# Patient Record
Sex: MALE | Race: WHITE | Employment: OTHER
[De-identification: names, ages, dates, MRNs, and addresses within clinical notes are randomized per-mention and may not be internally consistent; named-entity substitution may affect disease eponyms.]

---

## 2018-05-28 PROBLEM — K92.2 GASTROINTESTINAL HEMORRHAGE: Status: ACTIVE | Noted: 2018-05-28

## 2018-10-06 PROBLEM — L03.90 CELLULITIS: Status: ACTIVE | Noted: 2018-10-06

## 2022-03-19 PROBLEM — L03.90 CELLULITIS: Status: ACTIVE | Noted: 2018-10-06

## 2022-03-19 PROBLEM — K92.2 GASTROINTESTINAL HEMORRHAGE: Status: ACTIVE | Noted: 2018-05-28

## 2024-04-19 ENCOUNTER — HOSPITAL ENCOUNTER (EMERGENCY)
Facility: HOSPITAL | Age: 84
Discharge: HOME | End: 2024-04-19
Attending: EMERGENCY MEDICINE
Payer: MEDICARE

## 2024-04-19 ENCOUNTER — APPOINTMENT (OUTPATIENT)
Facility: HOSPITAL | Age: 84
End: 2024-04-19
Payer: MEDICARE

## 2024-04-19 VITALS
BODY MASS INDEX: 25.34 KG/M2 | DIASTOLIC BLOOD PRESSURE: 88 MMHG | HEART RATE: 73 BPM | RESPIRATION RATE: 18 BRPM | SYSTOLIC BLOOD PRESSURE: 135 MMHG | OXYGEN SATURATION: 97 % | TEMPERATURE: 97.9 F | HEIGHT: 70 IN | WEIGHT: 177 LBS

## 2024-04-19 DIAGNOSIS — S01.01XA LACERATION OF SCALP, INITIAL ENCOUNTER: Primary | ICD-10-CM
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
## 2024-04-19 PROCEDURE — 70450 CT HEAD/BRAIN W/O DYE: CPT

## 2024-04-19 PROCEDURE — 72125 CT NECK SPINE W/O DYE: CPT

## 2024-04-19 PROCEDURE — 99284 EMERGENCY DEPT VISIT MOD MDM: CPT

## 2024-04-19 ASSESSMENT — PAIN DESCRIPTION - PAIN TYPE: TYPE: ACUTE PAIN

## 2024-04-19 ASSESSMENT — PAIN DESCRIPTION - LOCATION: LOCATION: HEAD

## 2024-04-19 ASSESSMENT — PAIN - FUNCTIONAL ASSESSMENT: PAIN_FUNCTIONAL_ASSESSMENT: 0-10

## 2024-04-19 ASSESSMENT — PAIN DESCRIPTION - ORIENTATION: ORIENTATION: POSTERIOR

## 2024-04-19 ASSESSMENT — PAIN SCALES - GENERAL: PAINLEVEL_OUTOF10: 2

## 2024-04-19 NOTE — ED TRIAGE NOTES
Pt was brought to triage by his neighbor c/o fall and posterior head laceration.    Pt states his rollator was caught on door and he fell backwards. (-) LOC.     Pt takes Plavix.

## 2024-04-20 NOTE — ED PROVIDER NOTES
EMERGENCY DEPARTMENT HISTORY AND PHYSICAL EXAM      Patient Name: Stefano Begum Jr.  MRN: [id number]  YOB: 1940  Provider: Marielos Blake MD  PCP: Mignon Foster PA   Time/Date of evaluation: 11:08 PM EDT on 4/19/24    History of Presenting Illness     Chief Complaint   Patient presents with    Fall    Head Laceration       History Provided By: EMS and Patient     History (Narative):   Stefano Begum Jr. is a 84 y.o. male with a PMHX of peripheral vascular disease, hypertension, depression, and takes Plavix  who presents to the emergency department  by POV C/O a fall at home and hitting the back of his head.  The patient states that he was walking with his rollator walker from the living room to the kitchen.  His wheel got stuck on the door jam.  He fell backwards on the tile floor and hit his head.  He did have significant bleeding and he does take Plavix.  He became concerned and decided to come to the ED for further evaluation.    He denies losing consciousness.        Past History     Past Medical History:  No past medical history on file.    Past Surgical History:  No past surgical history on file.    Family History:  No family history on file.    Social History:       Medications:  No current facility-administered medications for this encounter.     No current outpatient medications on file.       Allergies:  No Known Allergies    Social Determinants of Health:  Social Determinants of Health     Tobacco Use: Not on file   Alcohol Use: Not on file   Financial Resource Strain: Not on file   Food Insecurity: Not on file   Transportation Needs: Not on file   Physical Activity: Not on file   Stress: Not on file   Social Connections: Not on file   Intimate Partner Violence: Not on file   Depression: Not on file   Housing Stability: Not on file   Interpersonal Safety: Not on file   Utilities: Not on file       Review of Systems     Negative except as listed above in HPI.    Physical Exam     Vitals:

## 2024-04-20 NOTE — ED NOTES
Patient discharged to home. Discharge papers explained and given to patient. Escorted patient via wheel chair to ER exit to waiting car.

## 2024-12-23 ENCOUNTER — APPOINTMENT (OUTPATIENT)
Facility: HOSPITAL | Age: 84
DRG: 539 | End: 2024-12-23
Payer: MEDICARE

## 2024-12-23 ENCOUNTER — HOSPITAL ENCOUNTER (INPATIENT)
Facility: HOSPITAL | Age: 84
LOS: 16 days | Discharge: NURSING, REHAB OR LONG-TERM CARE | DRG: 539 | End: 2025-01-08
Attending: STUDENT IN AN ORGANIZED HEALTH CARE EDUCATION/TRAINING PROGRAM | Admitting: INTERNAL MEDICINE
Payer: MEDICARE

## 2024-12-23 DIAGNOSIS — I50.9 CHRONIC CONGESTIVE HEART FAILURE, UNSPECIFIED HEART FAILURE TYPE (HCC): ICD-10-CM

## 2024-12-23 DIAGNOSIS — I96 WET GANGRENE (HCC): Primary | ICD-10-CM

## 2024-12-23 DIAGNOSIS — L03.90 WOUND CELLULITIS: ICD-10-CM

## 2024-12-23 DIAGNOSIS — R79.82 ELEVATED C-REACTIVE PROTEIN (CRP): ICD-10-CM

## 2024-12-23 LAB
ALBUMIN SERPL-MCNC: 2.7 G/DL (ref 3.4–5)
ALBUMIN/GLOB SERPL: 0.7 (ref 0.8–1.7)
ALP SERPL-CCNC: 99 U/L (ref 45–117)
ALT SERPL-CCNC: 31 U/L (ref 16–61)
ANION GAP SERPL CALC-SCNC: 2 MMOL/L (ref 3–18)
AST SERPL-CCNC: 31 U/L (ref 10–38)
BILIRUB SERPL-MCNC: 0.9 MG/DL (ref 0.2–1)
BUN SERPL-MCNC: 11 MG/DL (ref 7–18)
BUN/CREAT SERPL: 15 (ref 12–20)
CALCIUM SERPL-MCNC: 8.8 MG/DL (ref 8.5–10.1)
CHLORIDE SERPL-SCNC: 104 MMOL/L (ref 100–111)
CO2 SERPL-SCNC: 33 MMOL/L (ref 21–32)
CREAT SERPL-MCNC: 0.71 MG/DL (ref 0.6–1.3)
CRP SERPL-MCNC: 4 MG/DL (ref 0–0.3)
EKG DIAGNOSIS: NORMAL
EKG Q-T INTERVAL: 380 MS
EKG QRS DURATION: 84 MS
EKG QTC CALCULATION (BAZETT): 459 MS
EKG R AXIS: 54 DEGREES
EKG T AXIS: -82 DEGREES
EKG VENTRICULAR RATE: 88 BPM
ERYTHROCYTE [DISTWIDTH] IN BLOOD BY AUTOMATED COUNT: 16.8 % (ref 11.6–14.5)
ERYTHROCYTE [SEDIMENTATION RATE] IN BLOOD: 20 MM/HR (ref 0–20)
ETHANOL SERPL-MCNC: <3 MG/DL (ref 0–3)
GLOBULIN SER CALC-MCNC: 3.8 G/DL (ref 2–4)
GLUCOSE SERPL-MCNC: 112 MG/DL (ref 74–99)
HCT VFR BLD AUTO: 41.6 % (ref 36–48)
HGB BLD-MCNC: 12.7 G/DL (ref 13–16)
MCH RBC QN AUTO: 27.3 PG (ref 24–34)
MCHC RBC AUTO-ENTMCNC: 30.5 G/DL (ref 31–37)
MCV RBC AUTO: 89.5 FL (ref 78–100)
NRBC # BLD: 0 K/UL (ref 0–0.01)
NRBC BLD-RTO: 0 PER 100 WBC
PLATELET # BLD AUTO: 131 K/UL (ref 135–420)
PMV BLD AUTO: 10.4 FL (ref 9.2–11.8)
POTASSIUM SERPL-SCNC: 3.8 MMOL/L (ref 3.5–5.5)
PROT SERPL-MCNC: 6.5 G/DL (ref 6.4–8.2)
RBC # BLD AUTO: 4.65 M/UL (ref 4.35–5.65)
SODIUM SERPL-SCNC: 139 MMOL/L (ref 136–145)
WBC # BLD AUTO: 11.3 K/UL (ref 4.6–13.2)

## 2024-12-23 PROCEDURE — 6360000004 HC RX CONTRAST MEDICATION: Performed by: STUDENT IN AN ORGANIZED HEALTH CARE EDUCATION/TRAINING PROGRAM

## 2024-12-23 PROCEDURE — 1100000000 HC RM PRIVATE

## 2024-12-23 PROCEDURE — 99223 1ST HOSP IP/OBS HIGH 75: CPT

## 2024-12-23 PROCEDURE — 2580000003 HC RX 258: Performed by: STUDENT IN AN ORGANIZED HEALTH CARE EDUCATION/TRAINING PROGRAM

## 2024-12-23 PROCEDURE — 73706 CT ANGIO LWR EXTR W/O&W/DYE: CPT

## 2024-12-23 PROCEDURE — 93010 ELECTROCARDIOGRAM REPORT: CPT | Performed by: INTERNAL MEDICINE

## 2024-12-23 PROCEDURE — 87040 BLOOD CULTURE FOR BACTERIA: CPT

## 2024-12-23 PROCEDURE — 94761 N-INVAS EAR/PLS OXIMETRY MLT: CPT

## 2024-12-23 PROCEDURE — 93005 ELECTROCARDIOGRAM TRACING: CPT | Performed by: STUDENT IN AN ORGANIZED HEALTH CARE EDUCATION/TRAINING PROGRAM

## 2024-12-23 PROCEDURE — 73620 X-RAY EXAM OF FOOT: CPT

## 2024-12-23 PROCEDURE — 6360000002 HC RX W HCPCS: Performed by: STUDENT IN AN ORGANIZED HEALTH CARE EDUCATION/TRAINING PROGRAM

## 2024-12-23 PROCEDURE — 85652 RBC SED RATE AUTOMATED: CPT

## 2024-12-23 PROCEDURE — 82077 ASSAY SPEC XCP UR&BREATH IA: CPT

## 2024-12-23 PROCEDURE — 86140 C-REACTIVE PROTEIN: CPT

## 2024-12-23 PROCEDURE — 85027 COMPLETE CBC AUTOMATED: CPT

## 2024-12-23 PROCEDURE — 80053 COMPREHEN METABOLIC PANEL: CPT

## 2024-12-23 PROCEDURE — 99285 EMERGENCY DEPT VISIT HI MDM: CPT

## 2024-12-23 RX ORDER — IOPAMIDOL 755 MG/ML
85 INJECTION, SOLUTION INTRAVASCULAR
Status: COMPLETED | OUTPATIENT
Start: 2024-12-23 | End: 2024-12-23

## 2024-12-23 RX ORDER — VANCOMYCIN 1.5 G/300ML
25 INJECTION, SOLUTION INTRAVENOUS
Status: COMPLETED | OUTPATIENT
Start: 2024-12-23 | End: 2024-12-24

## 2024-12-23 RX ADMIN — IOPAMIDOL 85 ML: 755 INJECTION, SOLUTION INTRAVENOUS at 20:47

## 2024-12-23 RX ADMIN — PIPERACILLIN AND TAZOBACTAM 4500 MG: 4; .5 INJECTION, POWDER, FOR SOLUTION INTRAVENOUS at 22:51

## 2024-12-23 NOTE — ED NOTES
PT brought in by EMS. EMS reports landlord called 911 after finding PT and wife lying in bed \"for at least the past couple weeks\" in their own excrements. PT and wife stated to EMS they \"wanted to lay in bed until we die\". APS to be called by landlord, EMS, and primary RN. EMS described the house in unlivable condition. EMS reports PT has known pressure ulcer to left foot that appears gangrenous at this time. PT's foot is black in appearance. PT previously refused amputation.

## 2024-12-23 NOTE — ED PROVIDER NOTES
popliteal artery about 2.2 cm.  Area consistent with osteomyelitis of the first metatarsal all the way down to the fifth phalange E.  Discussed workup with podiatry.  Recommendation for patient to be admitted for possible amputation.  Consulted vascular surgery.  No further interventions at this time.  They will see the patient in inpatient setting.  Patient treated with empiric antibiotics.  Patient admitted for higher level of care.  Patient main stable.    ED Course as of 12/25/24 2226   Mon Dec 23, 2024   1808 EKG: A-fib, heart rate 88, QTc 459, normal axis, no signs of acute ischemic changes. [KS]   1951 XR FOOT LEFT (2 VIEWS)     IMPRESSION:  1.  No acute fracture or dislocation.  2.  Achilles tendinopathy  3.  Severe first MTP osteoarthritis.   [KS]   2107 CTA LOWER EXTREMITY LEFT W WO CONTRAST  *PRELIMINARY*:  Atheromatous disease with patent SFA proximally with mild irregularity distally.  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 Partial reconstitution distally along the posterior tibial  artery. Irregular/poor enhancement of the anterior tibial artery.  Extensive wound to the foot with intraosseous air consistent with osteomyelitis  involving the first metatarsal, first through fifth phalanges and fifth MTP  joint.      *Please follow up on final report for additional details*      [KS]   2212 Spoke with vascular surgery. No further recommendations at this time. They will see the patient.  [KS]      ED Course User Index  [KS] Glynn Moraes MD       Patient was given the following medications:  Medications   sodium chloride flush 0.9 % injection 5-40 mL (10 mLs IntraVENous Given 12/25/24 2031)   sodium chloride flush 0.9 % injection 5-40 mL (has no administration in time range)   potassium chloride (KLOR-CON M) extended release tablet 40 mEq (40 mEq Oral Given 12/25/24 0847)     Or   potassium bicarb-citric acid (EFFER-K) effervescent tablet 40 mEq (

## 2024-12-23 NOTE — CARE COORDINATION
NEHAL met with pt at the bedside,introduced self, explained role and verified demographics. Pt states lives with spouse, they 4 adult children that are out of town.      Spoke with Ying Lawrence [phone] pt was dc from [facility] on Thursday with HH through [facility].      Called APS worker Nadeem [phone], states the pt does have medicaid and she is working on UAI.     Will wait for medical clearance to pursue placement.    Astrid Rubio BSN RN  Case Management  [phone]

## 2024-12-24 ENCOUNTER — APPOINTMENT (OUTPATIENT)
Facility: HOSPITAL | Age: 84
DRG: 539 | End: 2024-12-24
Attending: STUDENT IN AN ORGANIZED HEALTH CARE EDUCATION/TRAINING PROGRAM
Payer: MEDICARE

## 2024-12-24 PROBLEM — E03.9 HYPOTHYROIDISM: Status: ACTIVE | Noted: 2024-12-24

## 2024-12-24 PROBLEM — I50.9 CHRONIC CHF (CONGESTIVE HEART FAILURE) (HCC): Status: ACTIVE | Noted: 2024-12-24

## 2024-12-24 PROBLEM — Z51.5 ENCOUNTER FOR PALLIATIVE CARE: Status: ACTIVE | Noted: 2024-12-24

## 2024-12-24 PROBLEM — Z86.79 HISTORY OF ATRIAL FIBRILLATION: Status: ACTIVE | Noted: 2024-12-24

## 2024-12-24 PROBLEM — Z71.89 GOALS OF CARE, COUNSELING/DISCUSSION: Status: ACTIVE | Noted: 2024-12-24

## 2024-12-24 PROBLEM — I73.9 PERIPHERAL ARTERIAL DISEASE (HCC): Status: ACTIVE | Noted: 2024-12-24

## 2024-12-24 PROBLEM — N40.0 BPH (BENIGN PROSTATIC HYPERPLASIA): Status: ACTIVE | Noted: 2024-12-24

## 2024-12-24 PROBLEM — M86.9 OSTEOMYELITIS OF LEFT LOWER EXTREMITY: Status: ACTIVE | Noted: 2024-12-24

## 2024-12-24 PROBLEM — E44.0 MODERATE PROTEIN-CALORIE MALNUTRITION (HCC): Status: ACTIVE | Noted: 2024-12-24

## 2024-12-24 LAB
AMORPH CRY URNS QL MICRO: ABNORMAL
AMPHET UR QL SCN: NEGATIVE
ANION GAP SERPL CALC-SCNC: 9 MMOL/L (ref 3–18)
APPEARANCE UR: ABNORMAL
BACTERIA URNS QL MICRO: ABNORMAL /HPF
BARBITURATES UR QL SCN: NEGATIVE
BASOPHILS # BLD: 0 K/UL (ref 0–0.1)
BASOPHILS NFR BLD: 0 % (ref 0–2)
BENZODIAZ UR QL: NEGATIVE
BILIRUB UR QL: NEGATIVE
BUN SERPL-MCNC: 11 MG/DL (ref 7–18)
BUN/CREAT SERPL: 16 (ref 12–20)
CALCIUM SERPL-MCNC: 8.7 MG/DL (ref 8.5–10.1)
CANNABINOIDS UR QL SCN: NEGATIVE
CHLORIDE SERPL-SCNC: 105 MMOL/L (ref 100–111)
CO2 SERPL-SCNC: 27 MMOL/L (ref 21–32)
COCAINE UR QL SCN: NEGATIVE
COLOR UR: YELLOW
CREAT SERPL-MCNC: 0.7 MG/DL (ref 0.6–1.3)
DIFFERENTIAL METHOD BLD: ABNORMAL
EOSINOPHIL # BLD: 0 K/UL (ref 0–0.4)
EOSINOPHIL NFR BLD: 0 % (ref 0–5)
EPITH CASTS URNS QL MICRO: ABNORMAL /LPF (ref 0–5)
ERYTHROCYTE [DISTWIDTH] IN BLOOD BY AUTOMATED COUNT: 16.6 % (ref 11.6–14.5)
GLUCOSE SERPL-MCNC: 121 MG/DL (ref 74–99)
GLUCOSE UR STRIP.AUTO-MCNC: NEGATIVE MG/DL
HCT VFR BLD AUTO: 38.6 % (ref 36–48)
HGB BLD-MCNC: 11.9 G/DL (ref 13–16)
HGB UR QL STRIP: NEGATIVE
IMM GRANULOCYTES # BLD AUTO: 0.1 K/UL (ref 0–0.04)
IMM GRANULOCYTES NFR BLD AUTO: 1 % (ref 0–0.5)
KETONES UR QL STRIP.AUTO: NEGATIVE MG/DL
LEUKOCYTE ESTERASE UR QL STRIP.AUTO: NEGATIVE
LYMPHOCYTES # BLD: 0.8 K/UL (ref 0.9–3.6)
LYMPHOCYTES NFR BLD: 6 % (ref 21–52)
Lab: NORMAL
MAGNESIUM SERPL-MCNC: 1.8 MG/DL (ref 1.6–2.6)
MCH RBC QN AUTO: 27.3 PG (ref 24–34)
MCHC RBC AUTO-ENTMCNC: 30.8 G/DL (ref 31–37)
MCV RBC AUTO: 88.5 FL (ref 78–100)
METHADONE UR QL: NEGATIVE
MONOCYTES # BLD: 0.9 K/UL (ref 0.05–1.2)
MONOCYTES NFR BLD: 7 % (ref 3–10)
MUCOUS THREADS URNS QL MICRO: NEGATIVE /LPF
NEUTS SEG # BLD: 10.6 K/UL (ref 1.8–8)
NEUTS SEG NFR BLD: 85 % (ref 40–73)
NITRITE UR QL STRIP.AUTO: NEGATIVE
NRBC # BLD: 0 K/UL (ref 0–0.01)
NRBC BLD-RTO: 0 PER 100 WBC
OPIATES UR QL: NEGATIVE
PCP UR QL: NEGATIVE
PH UR STRIP: 8.5 (ref 5–8)
PLATELET # BLD AUTO: 123 K/UL (ref 135–420)
PMV BLD AUTO: 10.7 FL (ref 9.2–11.8)
POTASSIUM SERPL-SCNC: 3.5 MMOL/L (ref 3.5–5.5)
PROT UR STRIP-MCNC: ABNORMAL MG/DL
RBC # BLD AUTO: 4.36 M/UL (ref 4.35–5.65)
RBC #/AREA URNS HPF: ABNORMAL /HPF (ref 0–5)
SODIUM SERPL-SCNC: 141 MMOL/L (ref 136–145)
SP GR UR REFRACTOMETRY: 1.02 (ref 1–1.03)
T4 FREE SERPL-MCNC: 1.2 NG/DL (ref 0.7–1.5)
TSH SERPL DL<=0.05 MIU/L-ACNC: 10.2 UIU/ML (ref 0.36–3.74)
UROBILINOGEN UR QL STRIP.AUTO: 2 EU/DL (ref 0.2–1)
VANCOMYCIN SERPL-MCNC: 19 UG/ML (ref 5–40)
WBC # BLD AUTO: 12.4 K/UL (ref 4.6–13.2)
WBC URNS QL MICRO: ABNORMAL /HPF (ref 0–5)

## 2024-12-24 PROCEDURE — 80202 ASSAY OF VANCOMYCIN: CPT

## 2024-12-24 PROCEDURE — 2500000003 HC RX 250 WO HCPCS

## 2024-12-24 PROCEDURE — 36415 COLL VENOUS BLD VENIPUNCTURE: CPT

## 2024-12-24 PROCEDURE — 6360000002 HC RX W HCPCS: Performed by: INTERNAL MEDICINE

## 2024-12-24 PROCEDURE — 99223 1ST HOSP IP/OBS HIGH 75: CPT

## 2024-12-24 PROCEDURE — 2580000003 HC RX 258

## 2024-12-24 PROCEDURE — 84443 ASSAY THYROID STIM HORMONE: CPT

## 2024-12-24 PROCEDURE — 6360000002 HC RX W HCPCS: Performed by: STUDENT IN AN ORGANIZED HEALTH CARE EDUCATION/TRAINING PROGRAM

## 2024-12-24 PROCEDURE — 87070 CULTURE OTHR SPECIMN AEROBIC: CPT

## 2024-12-24 PROCEDURE — 81001 URINALYSIS AUTO W/SCOPE: CPT

## 2024-12-24 PROCEDURE — 84439 ASSAY OF FREE THYROXINE: CPT

## 2024-12-24 PROCEDURE — 6370000000 HC RX 637 (ALT 250 FOR IP): Performed by: INTERNAL MEDICINE

## 2024-12-24 PROCEDURE — 94761 N-INVAS EAR/PLS OXIMETRY MLT: CPT

## 2024-12-24 PROCEDURE — 85025 COMPLETE CBC W/AUTO DIFF WBC: CPT

## 2024-12-24 PROCEDURE — 6370000000 HC RX 637 (ALT 250 FOR IP)

## 2024-12-24 PROCEDURE — 80048 BASIC METABOLIC PNL TOTAL CA: CPT

## 2024-12-24 PROCEDURE — 2580000003 HC RX 258: Performed by: INTERNAL MEDICINE

## 2024-12-24 PROCEDURE — 93306 TTE W/DOPPLER COMPLETE: CPT

## 2024-12-24 PROCEDURE — 83735 ASSAY OF MAGNESIUM: CPT

## 2024-12-24 PROCEDURE — 1100000000 HC RM PRIVATE

## 2024-12-24 PROCEDURE — 80307 DRUG TEST PRSMV CHEM ANLYZR: CPT

## 2024-12-24 PROCEDURE — 87205 SMEAR GRAM STAIN: CPT

## 2024-12-24 PROCEDURE — 6360000002 HC RX W HCPCS

## 2024-12-24 RX ORDER — POTASSIUM CHLORIDE 7.45 MG/ML
10 INJECTION INTRAVENOUS PRN
Status: DISCONTINUED | OUTPATIENT
Start: 2024-12-24 | End: 2025-01-08 | Stop reason: HOSPADM

## 2024-12-24 RX ORDER — PANTOPRAZOLE SODIUM 40 MG/1
40 TABLET, DELAYED RELEASE ORAL PRN
COMMUNITY

## 2024-12-24 RX ORDER — SODIUM CHLORIDE 0.9 % (FLUSH) 0.9 %
5-40 SYRINGE (ML) INJECTION EVERY 12 HOURS SCHEDULED
Status: DISCONTINUED | OUTPATIENT
Start: 2024-12-24 | End: 2025-01-08 | Stop reason: HOSPADM

## 2024-12-24 RX ORDER — ROSUVASTATIN CALCIUM 40 MG/1
40 TABLET, COATED ORAL EVERY EVENING
COMMUNITY

## 2024-12-24 RX ORDER — LEVOTHYROXINE SODIUM 50 UG/1
50 TABLET ORAL DAILY
Status: DISCONTINUED | OUTPATIENT
Start: 2024-12-24 | End: 2025-01-08 | Stop reason: HOSPADM

## 2024-12-24 RX ORDER — ATENOLOL 50 MG/1
50 TABLET ORAL DAILY
COMMUNITY

## 2024-12-24 RX ORDER — LEVOTHYROXINE SODIUM 50 UG/1
50 TABLET ORAL DAILY
COMMUNITY

## 2024-12-24 RX ORDER — TRAZODONE HYDROCHLORIDE 50 MG/1
50 TABLET, FILM COATED ORAL NIGHTLY
Status: DISCONTINUED | OUTPATIENT
Start: 2024-12-24 | End: 2025-01-08 | Stop reason: HOSPADM

## 2024-12-24 RX ORDER — ONDANSETRON 2 MG/ML
4 INJECTION INTRAMUSCULAR; INTRAVENOUS EVERY 6 HOURS PRN
Status: DISCONTINUED | OUTPATIENT
Start: 2024-12-24 | End: 2025-01-08 | Stop reason: HOSPADM

## 2024-12-24 RX ORDER — ACETAMINOPHEN 650 MG/1
650 SUPPOSITORY RECTAL EVERY 6 HOURS PRN
Status: DISCONTINUED | OUTPATIENT
Start: 2024-12-24 | End: 2024-12-24

## 2024-12-24 RX ORDER — POLYETHYLENE GLYCOL 3350 17 G/17G
17 POWDER, FOR SOLUTION ORAL DAILY PRN
Status: DISCONTINUED | OUTPATIENT
Start: 2024-12-24 | End: 2025-01-08 | Stop reason: HOSPADM

## 2024-12-24 RX ORDER — POTASSIUM CHLORIDE 1500 MG/1
40 TABLET, EXTENDED RELEASE ORAL PRN
Status: DISCONTINUED | OUTPATIENT
Start: 2024-12-24 | End: 2025-01-08 | Stop reason: HOSPADM

## 2024-12-24 RX ORDER — MAGNESIUM SULFATE IN WATER 40 MG/ML
2000 INJECTION, SOLUTION INTRAVENOUS PRN
Status: DISCONTINUED | OUTPATIENT
Start: 2024-12-24 | End: 2025-01-08 | Stop reason: HOSPADM

## 2024-12-24 RX ORDER — TRAMADOL HYDROCHLORIDE 50 MG/1
50 TABLET ORAL EVERY 6 HOURS PRN
Status: COMPLETED | OUTPATIENT
Start: 2024-12-24 | End: 2024-12-24

## 2024-12-24 RX ORDER — TRAZODONE HYDROCHLORIDE 50 MG/1
50 TABLET, FILM COATED ORAL NIGHTLY
COMMUNITY

## 2024-12-24 RX ORDER — FAMOTIDINE 20 MG/1
20 TABLET, FILM COATED ORAL 2 TIMES DAILY
Status: DISCONTINUED | OUTPATIENT
Start: 2024-12-24 | End: 2025-01-08 | Stop reason: HOSPADM

## 2024-12-24 RX ORDER — ATENOLOL 50 MG/1
50 TABLET ORAL DAILY
Status: DISCONTINUED | OUTPATIENT
Start: 2024-12-24 | End: 2025-01-08 | Stop reason: HOSPADM

## 2024-12-24 RX ORDER — ACETAMINOPHEN 325 MG/1
650 TABLET ORAL EVERY 6 HOURS PRN
Status: DISCONTINUED | OUTPATIENT
Start: 2024-12-24 | End: 2025-01-04

## 2024-12-24 RX ORDER — ROSUVASTATIN CALCIUM 20 MG/1
40 TABLET, COATED ORAL EVERY EVENING
Status: DISCONTINUED | OUTPATIENT
Start: 2024-12-24 | End: 2025-01-08 | Stop reason: HOSPADM

## 2024-12-24 RX ORDER — ACETAMINOPHEN 325 MG/1
650 TABLET ORAL EVERY 6 HOURS PRN
Status: DISCONTINUED | OUTPATIENT
Start: 2024-12-24 | End: 2024-12-24

## 2024-12-24 RX ORDER — SODIUM CHLORIDE 0.9 % (FLUSH) 0.9 %
5-40 SYRINGE (ML) INJECTION PRN
Status: DISCONTINUED | OUTPATIENT
Start: 2024-12-24 | End: 2025-01-08 | Stop reason: HOSPADM

## 2024-12-24 RX ORDER — ONDANSETRON 4 MG/1
4 TABLET, ORALLY DISINTEGRATING ORAL EVERY 8 HOURS PRN
Status: DISCONTINUED | OUTPATIENT
Start: 2024-12-24 | End: 2025-01-08 | Stop reason: HOSPADM

## 2024-12-24 RX ORDER — ACETAMINOPHEN 650 MG/1
650 SUPPOSITORY RECTAL EVERY 6 HOURS PRN
Status: DISCONTINUED | OUTPATIENT
Start: 2024-12-24 | End: 2025-01-04

## 2024-12-24 RX ADMIN — SODIUM CHLORIDE, PRESERVATIVE FREE 10 ML: 5 INJECTION INTRAVENOUS at 20:26

## 2024-12-24 RX ADMIN — VANCOMYCIN 1500 MG: 1.5 INJECTION, SOLUTION INTRAVENOUS at 00:10

## 2024-12-24 RX ADMIN — ROSUVASTATIN CALCIUM 40 MG: 20 TABLET, FILM COATED ORAL at 18:48

## 2024-12-24 RX ADMIN — TRAZODONE HYDROCHLORIDE 50 MG: 50 TABLET ORAL at 20:25

## 2024-12-24 RX ADMIN — ATENOLOL 50 MG: 50 TABLET ORAL at 08:46

## 2024-12-24 RX ADMIN — SERTRALINE HYDROCHLORIDE 50 MG: 50 TABLET ORAL at 08:48

## 2024-12-24 RX ADMIN — TRAMADOL HYDROCHLORIDE 50 MG: 50 TABLET, COATED ORAL at 09:37

## 2024-12-24 RX ADMIN — TRAMADOL HYDROCHLORIDE 50 MG: 50 TABLET, COATED ORAL at 15:46

## 2024-12-24 RX ADMIN — PIPERACILLIN AND TAZOBACTAM 3375 MG: 3; .375 INJECTION, POWDER, LYOPHILIZED, FOR SOLUTION INTRAVENOUS at 20:23

## 2024-12-24 RX ADMIN — LEVOTHYROXINE SODIUM 50 MCG: 0.05 TABLET ORAL at 06:21

## 2024-12-24 RX ADMIN — TRAMADOL HYDROCHLORIDE 50 MG: 50 TABLET, COATED ORAL at 01:41

## 2024-12-24 RX ADMIN — FAMOTIDINE 20 MG: 20 TABLET ORAL at 20:25

## 2024-12-24 RX ADMIN — VANCOMYCIN HYDROCHLORIDE 750 MG: 750 INJECTION, POWDER, LYOPHILIZED, FOR SOLUTION INTRAVENOUS at 12:14

## 2024-12-24 RX ADMIN — PIPERACILLIN AND TAZOBACTAM 3375 MG: 3; .375 INJECTION, POWDER, LYOPHILIZED, FOR SOLUTION INTRAVENOUS at 05:08

## 2024-12-24 RX ADMIN — SODIUM CHLORIDE, PRESERVATIVE FREE 10 ML: 5 INJECTION INTRAVENOUS at 08:50

## 2024-12-24 RX ADMIN — PIPERACILLIN AND TAZOBACTAM 3375 MG: 3; .375 INJECTION, POWDER, LYOPHILIZED, FOR SOLUTION INTRAVENOUS at 13:54

## 2024-12-24 RX ADMIN — FAMOTIDINE 20 MG: 20 TABLET ORAL at 08:48

## 2024-12-24 ASSESSMENT — PAIN SCALES - GENERAL
PAINLEVEL_OUTOF10: 0
PAINLEVEL_OUTOF10: 1
PAINLEVEL_OUTOF10: 8
PAINLEVEL_OUTOF10: 0
PAINLEVEL_OUTOF10: 8
PAINLEVEL_OUTOF10: 2
PAINLEVEL_OUTOF10: 0
PAINLEVEL_OUTOF10: 7

## 2024-12-24 ASSESSMENT — PAIN DESCRIPTION - DIRECTION
RADIATING_TOWARDS: NO
RADIATING_TOWARDS: NO

## 2024-12-24 ASSESSMENT — PAIN - FUNCTIONAL ASSESSMENT
PAIN_FUNCTIONAL_ASSESSMENT: PREVENTS OR INTERFERES SOME ACTIVE ACTIVITIES AND ADLS
PAIN_FUNCTIONAL_ASSESSMENT: PREVENTS OR INTERFERES SOME ACTIVE ACTIVITIES AND ADLS
PAIN_FUNCTIONAL_ASSESSMENT: ACTIVITIES ARE NOT PREVENTED
PAIN_FUNCTIONAL_ASSESSMENT: PREVENTS OR INTERFERES SOME ACTIVE ACTIVITIES AND ADLS

## 2024-12-24 ASSESSMENT — PAIN DESCRIPTION - FREQUENCY
FREQUENCY: INTERMITTENT

## 2024-12-24 ASSESSMENT — PAIN DESCRIPTION - DESCRIPTORS
DESCRIPTORS: ACHING
DESCRIPTORS: ACHING
DESCRIPTORS: ACHING;DISCOMFORT
DESCRIPTORS: DISCOMFORT;ACHING

## 2024-12-24 ASSESSMENT — PAIN DESCRIPTION - PAIN TYPE
TYPE: CHRONIC PAIN

## 2024-12-24 ASSESSMENT — PAIN DESCRIPTION - LOCATION
LOCATION: FOOT

## 2024-12-24 ASSESSMENT — PAIN DESCRIPTION - ONSET
ONSET: ON-GOING
ONSET: PROGRESSIVE
ONSET: ON-GOING
ONSET: ON-GOING

## 2024-12-24 ASSESSMENT — PAIN DESCRIPTION - ORIENTATION
ORIENTATION: LEFT

## 2024-12-24 NOTE — CARE COORDINATION
NEHAL completed UAI/LTSS on the [facility] Medicaid [facility], Status is Successfully Submitted, Tracking # [id number].         NEHAL Perfect Served Dr Chen, and asked to please review and approved UAI/LTSS for SNF when possible.               Shawnee Mancera, RN  Case Management [phone]

## 2024-12-24 NOTE — CARE COORDINATION
CM sent out SNF referrals in [facility], and Home Health referrals in [facility] for discharge planning.   Patient will need auth for SNF if SNF is needed at discharge.           Shawnee Mancera, RN  Case Management [phone]

## 2024-12-24 NOTE — ACP (ADVANCE CARE PLANNING)
Advance Care Planning     Palliative Team Advance Care Planning (ACP) Conversation    Date of Conversation: 12/24/24     ACP documents on file prior to discussion:  -None    Healthcare Decision Maker:    Primary Decision Maker: Dinorah Begum - Spouse - [phone]     Conversation Summary:    Visited patient for new consult along with Palliative team member IRA Olivera NP. Patient lying in bed with head elevated. He is awake, alert and oriented X 4. Very pleasant and talkative with our team. Great historian. He is certainly able to participate in his health care conversations and make complex medical decisions. Bedside RN in room for morning medications.    Patient does not have an Advanced Medical Directive on file. He lives with his wife of 67 years. They have four children, 17 grandchildren and 12 great grandchildren. When asked if he would like to complete an AMD, he stated, \"eliminate my children, just my wife\". Explained that in the absence of an AMD, all medical decisions default to his wife, his legal NOK and MPOA. Patient is in agreement for his wife to make any decisions necessary. Declined to complete the AMD.  Patient shared that he is the caregiver for his wife. She is visually impaired, and cannot ambulate. He also is unable to ambulate. They are currently renting a \"room\" in friends house. 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   Goals of care discussion regarding the benefits and burdens of intubation and CPR in the event of respiratory decline or cardiopulmonary arrest. Patient voiced understanding and stated, \"Just let me go, I do not want to be resuscitated\". Introduced the DDNR form, reviewed and signed by patient. Form scanned into the EMR and copies provided to the patient. Bedside RN and attending made aware of DDNR completion.  Patient was very appreciative of our visit.    Palliative team remains available to provide support to Mr Begum and

## 2024-12-24 NOTE — CARE COORDINATION
CM uploaded clinicals to [facility].               Shawnee Mancera, RN  Case Management [phone]

## 2024-12-24 NOTE — CARE COORDINATION
12/24/24 2758   Service Assessment   Patient Orientation Alert and Oriented;Person;Place;Situation;Self   Cognition Alert   History Provided By Patient   Primary Caregiver Self   Accompanied By/Relationship No one is currently at the bedside with patient at this time.   Support Systems Spouse/Significant Other;Children   Patient's Healthcare Decision Maker is: Legal Next of Kin   PCP Verified by CM Yes   Last Visit to PCP Within last two years   Prior Functional Level Assistance with the following:;Bathing;Dressing;Toileting;Mobility   Current Functional Level Assistance with the following:;Bathing;Dressing;Toileting;Mobility   Can patient return to prior living arrangement No   Ability to make needs known: Good   Family able to assist with home care needs: No   Would you like for me to discuss the discharge plan with any other family members/significant others, and if so, who? Yes  (Patient's sons, listed in patient's contact list.)   Financial Resources Medicare  (Per APS, patient has Medicaid.)   Community Resources None   CM/SW Referral Other (see comment)  (Discharge planning.)   Social/Functional History   Lives With Spouse   Type of Home House   Home Layout One level   Home Access Ramped entrance   Bathroom Shower/Tub Tub/Shower unit   Bathroom Toilet Standard   Bathroom Equipment Toilet raiser;Shower chair;Grab bars in shower;Grab bars around toilet   Bathroom Accessibility Accessible   Home Equipment Cane;Rollator;Walker - Rolling   Receives Help From Family   Prior Level of Assist for ADLs Needs assistance   Toileting Needs assistance   Prior Level of Assist for Homemaking Needs assistance   Homemaking Responsibilities Yes   Ambulation Assistance Needs assistance   Prior Level of Assist for Transfers Needs assistance   Active  No   Patient's  Info Patient said he has not seen a doctor in over a year.   Mode of Transportation   (Not applicable.)   Education Not applicable.   Occupation

## 2024-12-24 NOTE — ED NOTES
TRANSFER - OUT REPORT:    Verbal report given to OSVALDO Tompkins on Stefano Begum Jr.  being transferred to [facility] for routine progression of patient care       Report consisted of patient's Situation, Background, Assessment and   Recommendations(SBAR).     Information from the following report(s) Nurse Handoff Report, ED Encounter Summary, ED SBAR, MAR, and Neuro Assessment was reviewed with the receiving nurse.      Lines:   Peripheral IV 12/23/24 Posterior;Right Hand (Active)   Site Assessment Clean, dry & intact 12/23/24 1842   Line Status Blood return noted 12/23/24 1842   Phlebitis Assessment No symptoms 12/23/24 1842   Infiltration Assessment 0 12/23/24 1842        Opportunity for questions and clarification was provided.      Patient transported with:  Tech

## 2024-12-24 NOTE — CARE COORDINATION
CM spoke with patient at the bedside.   CM updated patient that his wife was discharged today, and went to [facility].   CM let patient know that [facility] also accepted patient, and patient will be in a room with his wife.   Patient is agreeable to [facility].         Patient will be going to SNF to LTC at [facility].       Auth will be needed for SNF.         Shawnee Mancera, RN  Case Management [phone]

## 2024-12-24 NOTE — H&P
History and Physical          Subjective     HPI: Stefano Begum Jr. is a 84 y.o. male with a PMHx of cellulitis, hypertension, right foot toes amputated, severe PAD, hyperlipidemia, A-fib not on oral anticoagulation due to recurrent falls, hypothyroidism, BPH, chronic CHF, depression who presented to the ED with complaints of left foot evaluation.  Patient states he has chronic left foot wound and was recently admitted at [facility].  States he was supposed to have home health nurse to come home and assist with dressing changes however they states nobody has showed up.  Patient states it has been about 2 to 3 weeks since the wound has not been evaluated.  States he is the primary caretaker of his wife who is blind.  Patient has not left his house in the past month and has been poorly eating and drinking.  Currently upon my evaluation, patient resting in bed in no apparent distress.  Denies any pain at this time.  Patient also states his wife is in the ED down the [address], has sustained a fall.  According to chart review, there is also concern for.  According to chart review patient stated he wanted to just lay down in his bed and die with his wife.  Has not left his house in the past month and has been poorly eating and drinking.,  Denies any associated chest pain, shortness of breath, fever, chills, nausea vomiting diarrhea, abdominal pain, headache, cough, dizziness.  Denies smoking drinking or illicit drug use.    In the ED, respiration 18-22, heart rate 80s, blood pressure 143/95, 95% on room air.  CMP without significant abnormality.  CRP 4.0.  Ethanol level less than 3.  CBC without abnormality.  Left foot x-ray without acute process.  Left lower extremity CTA preliminary read with popliteal artery occlusion, extensive wound to the foot with air consistent with osteomyelitis involving the 1st through 5th phalanges and fifth MTP.  EKG with atrial fibrillation.  Podiatry and vascular consulted by the ED.  Received

## 2024-12-24 NOTE — CARE COORDINATION
CM spoke with patient's son Ash Begum [phone], and confirmed that discharge plan for patient is SNF to [facility] at discharge.       CM will need PT and OT ordered when appropriate.   Patient will need auth for SNF.       NEHAL opened the chart for SNF [facility] in Epic.       NEHAL Perfect Served Dr durant with discharge plan, need PT and OT orders when appropriate, and patient's sons names and phone numbers are in the chart to update.             Shawnee Mancera, RN  Case Management [phone]

## 2024-12-25 LAB
ANION GAP SERPL CALC-SCNC: 4 MMOL/L (ref 3–18)
BASOPHILS # BLD: 0 K/UL (ref 0–0.1)
BASOPHILS NFR BLD: 0 % (ref 0–2)
BUN SERPL-MCNC: 13 MG/DL (ref 7–18)
BUN/CREAT SERPL: 15 (ref 12–20)
CALCIUM SERPL-MCNC: 8.7 MG/DL (ref 8.5–10.1)
CHLORIDE SERPL-SCNC: 106 MMOL/L (ref 100–111)
CO2 SERPL-SCNC: 28 MMOL/L (ref 21–32)
CREAT SERPL-MCNC: 0.88 MG/DL (ref 0.6–1.3)
DIFFERENTIAL METHOD BLD: ABNORMAL
ECHO AO ASC DIAM: 3.8 CM
ECHO AO ASCENDING AORTA INDEX: 2.13 CM/M2
ECHO AO ROOT DIAM: 4 CM
ECHO AO ROOT INDEX: 2.25 CM/M2
ECHO AV AREA PEAK VELOCITY: 1.6 CM2
ECHO AV AREA VTI: 1.7 CM2
ECHO AV AREA/BSA PEAK VELOCITY: 0.9 CM2/M2
ECHO AV AREA/BSA VTI: 1 CM2/M2
ECHO AV MEAN GRADIENT: 2 MMHG
ECHO AV MEAN VELOCITY: 0.7 M/S
ECHO AV PEAK GRADIENT: 5 MMHG
ECHO AV PEAK VELOCITY: 1.1 M/S
ECHO AV VELOCITY RATIO: 0.45
ECHO AV VTI: 20 CM
ECHO BSA: 1.76 M2
ECHO EST RA PRESSURE: 15 MMHG
ECHO LA DIAMETER INDEX: 2.53 CM/M2
ECHO LA DIAMETER: 4.5 CM
ECHO LA TO AORTIC ROOT RATIO: 1.13
ECHO LA VOL A-L A2C: 79 ML (ref 18–58)
ECHO LA VOL A-L A4C: 93 ML (ref 18–58)
ECHO LA VOL BP: 79 ML (ref 18–58)
ECHO LA VOL MOD A2C: 72 ML (ref 18–58)
ECHO LA VOL MOD A4C: 79 ML (ref 18–58)
ECHO LA VOL/BSA BIPLANE: 44 ML/M2 (ref 16–34)
ECHO LA VOLUME AREA LENGTH: 90 ML
ECHO LA VOLUME INDEX A-L A2C: 44 ML/M2 (ref 16–34)
ECHO LA VOLUME INDEX A-L A4C: 52 ML/M2 (ref 16–34)
ECHO LA VOLUME INDEX AREA LENGTH: 51 ML/M2 (ref 16–34)
ECHO LA VOLUME INDEX MOD A2C: 40 ML/M2 (ref 16–34)
ECHO LA VOLUME INDEX MOD A4C: 44 ML/M2 (ref 16–34)
ECHO LV EDV A2C: 73 ML
ECHO LV EDV A4C: 88 ML
ECHO LV EDV BP: 81 ML (ref 67–155)
ECHO LV EDV INDEX A4C: 49 ML/M2
ECHO LV EDV INDEX BP: 46 ML/M2
ECHO LV EDV NDEX A2C: 41 ML/M2
ECHO LV EF PHYSICIAN: 50 %
ECHO LV EJECTION FRACTION A2C: 69 %
ECHO LV EJECTION FRACTION A4C: 53 %
ECHO LV EJECTION FRACTION BIPLANE: 61 % (ref 55–100)
ECHO LV ESV A2C: 23 ML
ECHO LV ESV A4C: 42 ML
ECHO LV ESV BP: 32 ML (ref 22–58)
ECHO LV ESV INDEX A2C: 13 ML/M2
ECHO LV ESV INDEX A4C: 24 ML/M2
ECHO LV ESV INDEX BP: 18 ML/M2
ECHO LV FRACTIONAL SHORTENING: 29 % (ref 28–44)
ECHO LV INTERNAL DIMENSION DIASTOLE INDEX: 2.87 CM/M2
ECHO LV INTERNAL DIMENSION DIASTOLIC: 5.1 CM (ref 4.2–5.9)
ECHO LV INTERNAL DIMENSION SYSTOLIC INDEX: 2.02 CM/M2
ECHO LV INTERNAL DIMENSION SYSTOLIC: 3.6 CM
ECHO LV IVSD: 1.2 CM (ref 0.6–1)
ECHO LV MASS 2D: 200.8 G (ref 88–224)
ECHO LV MASS INDEX 2D: 112.8 G/M2 (ref 49–115)
ECHO LV POSTERIOR WALL DIASTOLIC: 0.9 CM (ref 0.6–1)
ECHO LV RELATIVE WALL THICKNESS RATIO: 0.35
ECHO LVOT AREA: 3.5 CM2
ECHO LVOT AV VTI INDEX: 0.5
ECHO LVOT DIAM: 2.1 CM
ECHO LVOT MEAN GRADIENT: 1 MMHG
ECHO LVOT PEAK GRADIENT: 1 MMHG
ECHO LVOT PEAK VELOCITY: 0.5 M/S
ECHO LVOT STROKE VOLUME INDEX: 19.4 ML/M2
ECHO LVOT SV: 34.6 ML
ECHO LVOT VTI: 10 CM
ECHO PV MAX VELOCITY: 0.6 M/S
ECHO PV PEAK GRADIENT: 1 MMHG
ECHO RA AREA 4C: 22 CM2
ECHO RIGHT VENTRICULAR SYSTOLIC PRESSURE (RVSP): 49 MMHG
ECHO RV BASAL DIMENSION: 4 CM
ECHO RV TAPSE: 2.1 CM (ref 1.7–?)
ECHO TV REGURGITANT MAX VELOCITY: 2.91 M/S
ECHO TV REGURGITANT PEAK GRADIENT: 34 MMHG
EOSINOPHIL # BLD: 0.1 K/UL (ref 0–0.4)
EOSINOPHIL NFR BLD: 1 % (ref 0–5)
ERYTHROCYTE [DISTWIDTH] IN BLOOD BY AUTOMATED COUNT: 17 % (ref 11.6–14.5)
GLUCOSE SERPL-MCNC: 97 MG/DL (ref 74–99)
HCT VFR BLD AUTO: 35.4 % (ref 36–48)
HGB BLD-MCNC: 10.8 G/DL (ref 13–16)
IMM GRANULOCYTES # BLD AUTO: 0.1 K/UL (ref 0–0.04)
IMM GRANULOCYTES NFR BLD AUTO: 1 % (ref 0–0.5)
LYMPHOCYTES # BLD: 1 K/UL (ref 0.9–3.6)
LYMPHOCYTES NFR BLD: 12 % (ref 21–52)
MAGNESIUM SERPL-MCNC: 1.9 MG/DL (ref 1.6–2.6)
MCH RBC QN AUTO: 27.6 PG (ref 24–34)
MCHC RBC AUTO-ENTMCNC: 30.5 G/DL (ref 31–37)
MCV RBC AUTO: 90.5 FL (ref 78–100)
MONOCYTES # BLD: 1.1 K/UL (ref 0.05–1.2)
MONOCYTES NFR BLD: 14 % (ref 3–10)
NEUTS SEG # BLD: 6.1 K/UL (ref 1.8–8)
NEUTS SEG NFR BLD: 73 % (ref 40–73)
NRBC # BLD: 0 K/UL (ref 0–0.01)
NRBC BLD-RTO: 0 PER 100 WBC
PHOSPHATE SERPL-MCNC: 3.5 MG/DL (ref 2.5–4.9)
PLATELET # BLD AUTO: 106 K/UL (ref 135–420)
PMV BLD AUTO: 10.8 FL (ref 9.2–11.8)
POTASSIUM SERPL-SCNC: 3.4 MMOL/L (ref 3.5–5.5)
RBC # BLD AUTO: 3.91 M/UL (ref 4.35–5.65)
SODIUM SERPL-SCNC: 138 MMOL/L (ref 136–145)
WBC # BLD AUTO: 8.3 K/UL (ref 4.6–13.2)

## 2024-12-25 PROCEDURE — 99232 SBSQ HOSP IP/OBS MODERATE 35: CPT | Performed by: STUDENT IN AN ORGANIZED HEALTH CARE EDUCATION/TRAINING PROGRAM

## 2024-12-25 PROCEDURE — 6370000000 HC RX 637 (ALT 250 FOR IP): Performed by: STUDENT IN AN ORGANIZED HEALTH CARE EDUCATION/TRAINING PROGRAM

## 2024-12-25 PROCEDURE — 93306 TTE W/DOPPLER COMPLETE: CPT | Performed by: INTERNAL MEDICINE

## 2024-12-25 PROCEDURE — 6370000000 HC RX 637 (ALT 250 FOR IP)

## 2024-12-25 PROCEDURE — 6360000002 HC RX W HCPCS: Performed by: INTERNAL MEDICINE

## 2024-12-25 PROCEDURE — 94761 N-INVAS EAR/PLS OXIMETRY MLT: CPT

## 2024-12-25 PROCEDURE — 2580000003 HC RX 258: Performed by: INTERNAL MEDICINE

## 2024-12-25 PROCEDURE — 2580000003 HC RX 258

## 2024-12-25 PROCEDURE — 85025 COMPLETE CBC W/AUTO DIFF WBC: CPT

## 2024-12-25 PROCEDURE — 1100000000 HC RM PRIVATE

## 2024-12-25 PROCEDURE — 80048 BASIC METABOLIC PNL TOTAL CA: CPT

## 2024-12-25 PROCEDURE — 83735 ASSAY OF MAGNESIUM: CPT

## 2024-12-25 PROCEDURE — 6360000002 HC RX W HCPCS

## 2024-12-25 PROCEDURE — 2500000003 HC RX 250 WO HCPCS

## 2024-12-25 PROCEDURE — 36415 COLL VENOUS BLD VENIPUNCTURE: CPT

## 2024-12-25 PROCEDURE — 6360000002 HC RX W HCPCS: Performed by: STUDENT IN AN ORGANIZED HEALTH CARE EDUCATION/TRAINING PROGRAM

## 2024-12-25 PROCEDURE — 84100 ASSAY OF PHOSPHORUS: CPT

## 2024-12-25 RX ORDER — VANCOMYCIN 1.75 G/350ML
1250 INJECTION, SOLUTION INTRAVENOUS EVERY 24 HOURS
Status: DISCONTINUED | OUTPATIENT
Start: 2024-12-26 | End: 2024-12-26

## 2024-12-25 RX ORDER — ENOXAPARIN SODIUM 100 MG/ML
40 INJECTION SUBCUTANEOUS DAILY
Status: DISCONTINUED | OUTPATIENT
Start: 2024-12-25 | End: 2025-01-08 | Stop reason: HOSPADM

## 2024-12-25 RX ADMIN — SODIUM CHLORIDE, PRESERVATIVE FREE 10 ML: 5 INJECTION INTRAVENOUS at 20:31

## 2024-12-25 RX ADMIN — TRAZODONE HYDROCHLORIDE 50 MG: 50 TABLET ORAL at 20:31

## 2024-12-25 RX ADMIN — VANCOMYCIN HYDROCHLORIDE 750 MG: 750 INJECTION, POWDER, LYOPHILIZED, FOR SOLUTION INTRAVENOUS at 12:53

## 2024-12-25 RX ADMIN — ATENOLOL 50 MG: 50 TABLET ORAL at 08:47

## 2024-12-25 RX ADMIN — PIPERACILLIN AND TAZOBACTAM 3375 MG: 3; .375 INJECTION, POWDER, LYOPHILIZED, FOR SOLUTION INTRAVENOUS at 04:09

## 2024-12-25 RX ADMIN — FAMOTIDINE 20 MG: 20 TABLET ORAL at 20:31

## 2024-12-25 RX ADMIN — VANCOMYCIN HYDROCHLORIDE 750 MG: 750 INJECTION, POWDER, LYOPHILIZED, FOR SOLUTION INTRAVENOUS at 01:11

## 2024-12-25 RX ADMIN — ENOXAPARIN SODIUM 40 MG: 100 INJECTION SUBCUTANEOUS at 12:50

## 2024-12-25 RX ADMIN — ACETAMINOPHEN 325MG 650 MG: 325 TABLET ORAL at 16:11

## 2024-12-25 RX ADMIN — PIPERACILLIN AND TAZOBACTAM 3375 MG: 3; .375 INJECTION, POWDER, LYOPHILIZED, FOR SOLUTION INTRAVENOUS at 04:14

## 2024-12-25 RX ADMIN — ROSUVASTATIN CALCIUM 40 MG: 20 TABLET, FILM COATED ORAL at 17:41

## 2024-12-25 RX ADMIN — LEVOTHYROXINE SODIUM 50 MCG: 0.05 TABLET ORAL at 07:36

## 2024-12-25 RX ADMIN — PIPERACILLIN AND TAZOBACTAM 3375 MG: 3; .375 INJECTION, POWDER, LYOPHILIZED, FOR SOLUTION INTRAVENOUS at 20:30

## 2024-12-25 RX ADMIN — PIPERACILLIN AND TAZOBACTAM 3375 MG: 3; .375 INJECTION, POWDER, LYOPHILIZED, FOR SOLUTION INTRAVENOUS at 12:52

## 2024-12-25 RX ADMIN — SERTRALINE HYDROCHLORIDE 50 MG: 50 TABLET ORAL at 08:47

## 2024-12-25 RX ADMIN — SODIUM CHLORIDE, PRESERVATIVE FREE 10 ML: 5 INJECTION INTRAVENOUS at 08:47

## 2024-12-25 RX ADMIN — POTASSIUM CHLORIDE 40 MEQ: 1500 TABLET, EXTENDED RELEASE ORAL at 08:47

## 2024-12-25 RX ADMIN — FAMOTIDINE 20 MG: 20 TABLET ORAL at 08:47

## 2024-12-25 ASSESSMENT — PAIN - FUNCTIONAL ASSESSMENT
PAIN_FUNCTIONAL_ASSESSMENT: ACTIVITIES ARE NOT PREVENTED
PAIN_FUNCTIONAL_ASSESSMENT: ACTIVITIES ARE NOT PREVENTED

## 2024-12-25 ASSESSMENT — PAIN DESCRIPTION - FREQUENCY
FREQUENCY: INTERMITTENT
FREQUENCY: INTERMITTENT

## 2024-12-25 ASSESSMENT — PAIN DESCRIPTION - DESCRIPTORS
DESCRIPTORS: CRAMPING
DESCRIPTORS: CRAMPING

## 2024-12-25 ASSESSMENT — PAIN DESCRIPTION - PAIN TYPE
TYPE: ACUTE PAIN
TYPE: ACUTE PAIN

## 2024-12-25 ASSESSMENT — PAIN SCALES - GENERAL
PAINLEVEL_OUTOF10: 3
PAINLEVEL_OUTOF10: 0

## 2024-12-25 ASSESSMENT — PAIN DESCRIPTION - ORIENTATION
ORIENTATION: LEFT
ORIENTATION: LEFT

## 2024-12-25 ASSESSMENT — PAIN DESCRIPTION - ONSET
ONSET: SUDDEN
ONSET: SUDDEN

## 2024-12-25 ASSESSMENT — PAIN DESCRIPTION - LOCATION
LOCATION: LEG
LOCATION: LEG

## 2024-12-26 LAB
ANION GAP SERPL CALC-SCNC: 3 MMOL/L (ref 3–18)
BACTERIA SPEC CULT: ABNORMAL
BASOPHILS # BLD: 0 K/UL (ref 0–0.1)
BASOPHILS NFR BLD: 0 % (ref 0–2)
BUN SERPL-MCNC: 14 MG/DL (ref 7–18)
BUN/CREAT SERPL: 16 (ref 12–20)
CALCIUM SERPL-MCNC: 8.6 MG/DL (ref 8.5–10.1)
CHLORIDE SERPL-SCNC: 109 MMOL/L (ref 100–111)
CO2 SERPL-SCNC: 28 MMOL/L (ref 21–32)
CREAT SERPL-MCNC: 0.85 MG/DL (ref 0.6–1.3)
DIFFERENTIAL METHOD BLD: ABNORMAL
EOSINOPHIL # BLD: 0 K/UL (ref 0–0.4)
EOSINOPHIL NFR BLD: 0 % (ref 0–5)
ERYTHROCYTE [DISTWIDTH] IN BLOOD BY AUTOMATED COUNT: 17.1 % (ref 11.6–14.5)
GLUCOSE SERPL-MCNC: 91 MG/DL (ref 74–99)
GRAM STN SPEC: ABNORMAL
GRAM STN SPEC: ABNORMAL
HCT VFR BLD AUTO: 36.2 % (ref 36–48)
HGB BLD-MCNC: 11 G/DL (ref 13–16)
IMM GRANULOCYTES # BLD AUTO: 0.1 K/UL (ref 0–0.04)
IMM GRANULOCYTES NFR BLD AUTO: 1 % (ref 0–0.5)
LYMPHOCYTES # BLD: 1 K/UL (ref 0.9–3.6)
LYMPHOCYTES NFR BLD: 9 % (ref 21–52)
MCH RBC QN AUTO: 27.7 PG (ref 24–34)
MCHC RBC AUTO-ENTMCNC: 30.4 G/DL (ref 31–37)
MCV RBC AUTO: 91.2 FL (ref 78–100)
MONOCYTES # BLD: 1 K/UL (ref 0.05–1.2)
MONOCYTES NFR BLD: 10 % (ref 3–10)
NEUTS SEG # BLD: 8.2 K/UL (ref 1.8–8)
NEUTS SEG NFR BLD: 80 % (ref 40–73)
NRBC # BLD: 0 K/UL (ref 0–0.01)
NRBC BLD-RTO: 0 PER 100 WBC
PLATELET # BLD AUTO: 99 K/UL (ref 135–420)
PMV BLD AUTO: 11.1 FL (ref 9.2–11.8)
POTASSIUM SERPL-SCNC: 4.2 MMOL/L (ref 3.5–5.5)
RBC # BLD AUTO: 3.97 M/UL (ref 4.35–5.65)
SERVICE CMNT-IMP: ABNORMAL
SODIUM SERPL-SCNC: 140 MMOL/L (ref 136–145)
VANCOMYCIN SERPL-MCNC: 14.6 UG/ML (ref 5–40)
WBC # BLD AUTO: 10.3 K/UL (ref 4.6–13.2)

## 2024-12-26 PROCEDURE — 97166 OT EVAL MOD COMPLEX 45 MIN: CPT

## 2024-12-26 PROCEDURE — 6360000002 HC RX W HCPCS: Performed by: INTERNAL MEDICINE

## 2024-12-26 PROCEDURE — 1100000000 HC RM PRIVATE

## 2024-12-26 PROCEDURE — 36415 COLL VENOUS BLD VENIPUNCTURE: CPT

## 2024-12-26 PROCEDURE — 94761 N-INVAS EAR/PLS OXIMETRY MLT: CPT

## 2024-12-26 PROCEDURE — 85025 COMPLETE CBC W/AUTO DIFF WBC: CPT

## 2024-12-26 PROCEDURE — 97162 PT EVAL MOD COMPLEX 30 MIN: CPT

## 2024-12-26 PROCEDURE — 80202 ASSAY OF VANCOMYCIN: CPT

## 2024-12-26 PROCEDURE — 6370000000 HC RX 637 (ALT 250 FOR IP)

## 2024-12-26 PROCEDURE — 99232 SBSQ HOSP IP/OBS MODERATE 35: CPT | Performed by: STUDENT IN AN ORGANIZED HEALTH CARE EDUCATION/TRAINING PROGRAM

## 2024-12-26 PROCEDURE — 6360000002 HC RX W HCPCS: Performed by: STUDENT IN AN ORGANIZED HEALTH CARE EDUCATION/TRAINING PROGRAM

## 2024-12-26 PROCEDURE — 2580000003 HC RX 258

## 2024-12-26 PROCEDURE — 6370000000 HC RX 637 (ALT 250 FOR IP): Performed by: STUDENT IN AN ORGANIZED HEALTH CARE EDUCATION/TRAINING PROGRAM

## 2024-12-26 PROCEDURE — 80048 BASIC METABOLIC PNL TOTAL CA: CPT

## 2024-12-26 PROCEDURE — 2500000003 HC RX 250 WO HCPCS

## 2024-12-26 PROCEDURE — 97530 THERAPEUTIC ACTIVITIES: CPT

## 2024-12-26 PROCEDURE — 6360000002 HC RX W HCPCS

## 2024-12-26 PROCEDURE — 51798 US URINE CAPACITY MEASURE: CPT

## 2024-12-26 RX ADMIN — FAMOTIDINE 20 MG: 20 TABLET ORAL at 09:09

## 2024-12-26 RX ADMIN — VANCOMYCIN 1250 MG: 1.75 INJECTION, SOLUTION INTRAVENOUS at 11:54

## 2024-12-26 RX ADMIN — LEVOTHYROXINE SODIUM 50 MCG: 0.05 TABLET ORAL at 06:41

## 2024-12-26 RX ADMIN — TRAZODONE HYDROCHLORIDE 50 MG: 50 TABLET ORAL at 23:13

## 2024-12-26 RX ADMIN — ACETAMINOPHEN 325MG 650 MG: 325 TABLET ORAL at 23:48

## 2024-12-26 RX ADMIN — ENOXAPARIN SODIUM 40 MG: 100 INJECTION SUBCUTANEOUS at 09:08

## 2024-12-26 RX ADMIN — PIPERACILLIN AND TAZOBACTAM 3375 MG: 3; .375 INJECTION, POWDER, LYOPHILIZED, FOR SOLUTION INTRAVENOUS at 13:32

## 2024-12-26 RX ADMIN — ROSUVASTATIN CALCIUM 40 MG: 20 TABLET, FILM COATED ORAL at 17:35

## 2024-12-26 RX ADMIN — SODIUM CHLORIDE, PRESERVATIVE FREE 10 ML: 5 INJECTION INTRAVENOUS at 09:08

## 2024-12-26 RX ADMIN — SODIUM CHLORIDE, PRESERVATIVE FREE 10 ML: 5 INJECTION INTRAVENOUS at 23:13

## 2024-12-26 RX ADMIN — PIPERACILLIN AND TAZOBACTAM 3375 MG: 3; .375 INJECTION, POWDER, LYOPHILIZED, FOR SOLUTION INTRAVENOUS at 03:50

## 2024-12-26 RX ADMIN — FAMOTIDINE 20 MG: 20 TABLET ORAL at 23:13

## 2024-12-26 RX ADMIN — SERTRALINE HYDROCHLORIDE 50 MG: 50 TABLET ORAL at 09:14

## 2024-12-26 ASSESSMENT — PAIN SCALES - GENERAL
PAINLEVEL_OUTOF10: 0

## 2024-12-26 NOTE — PALLIATIVE CARE
Palliative Medicine     Mr Begum is known to this Palliative Department from Consult earlier this week. Chart notes reviewed. Patient has been consistent in his decision to decline amputation surgery for his right foot.  Call from  at the request of attending to revisit goals of care with Mr. Begum including a more comfort focused care plan.  Palliative team placed call to patient. He again verbally confirmed he does not want his lower extremity amputated. He suggested that the Doctors could just \"clean it up\".  He is open to discussing other options with our team including non aggressive measures. A meeting with patient is planned for 9AM on Friday 12/27.  Attending and consultants notes are greatly appreciated.     CODE STATUS - DNR/DNI     Trixie ZAMBRANO, RN  Palliative Medicine Inpatient RN  Palliative COPE Line: [phone]

## 2024-12-26 NOTE — CARE COORDINATION
Pre-cert Request   12/26/2024, 2:27 PM    Patient Name: Stefano Begum Jr.                   YOB: 1940      *ALERT - Authorization is pending review by the insurance company.  This is not an authorization.*    Submitted via [employer].  Requested Facility:  [facility]  Anticipated Admit Date to [facility]:  12/27/2024  Pending Auth #:  [id number]  Pending Plan Auth ID:     Livier Lobo  Case Management Department  Ph: [phone] Fax: [phone]

## 2024-12-26 NOTE — CARE COORDINATION
Dr Chen let CM know that PT and OT have been ordered.       NEHAL Perfect Served Rosa Manager of Therapy, and updated that PT and OT orders are in, and trying to see if patient can be seen today by PT and OT, due to therapy notes needed for auth for SNF.               Shawnee Mancera, RN  Case Management [phone]

## 2024-12-26 NOTE — CARE COORDINATION
Livier CALVERT Specialist let CM know that auth for SNF [facility] has been started.               Shawnee Mancera, RN  Case Management [phone]

## 2024-12-26 NOTE — CARE COORDINATION
SNF Authorization Request  12/26/2024, 11:56 AM    Patient Name: Stefano Begum Jr.                   YOB: 1940    Patient has been provided with freedom of choice and has chosen to go to [facility].     SNF has confirmed that they can accept the patient and they have a bed Yes  SNF has confirmed that they are in network with the patient's insurance Yes    Please request authorization.    Estimated date of discharge is 12/26/2024.    Skilled Need    PT Yes   OT Yes   Speech therapy No   Wound Care No  IV MedicationsNo  Trach No   Peg No     If PT/ OT/ Speech is required, evaluations/ notes have been updated within the last 48 hours Yes       Additional information NA    Payor: Payor: HU[employer] MEDICARE / Plan: HU[employer]NA GOLD PLUS HMO / Product Type: *No Product type* /   Attending physician: Enrique Chen DO Susan Flores  Case Management Department  Ph: [phone]

## 2024-12-26 NOTE — CARE COORDINATION
NEHAL Valdez Served Dr Chen for PT and OT orders for patient.   CM will need PT and OT notes to start auth for SNF.             Shawnee Mancera, RN  Case Management [phone]

## 2024-12-26 NOTE — CARE COORDINATION
CM uploaded UAI/LTSS in [facility] for SNF to [facility].             Shawnee Mancera, RN  Case Management [phone]

## 2024-12-26 NOTE — CARE COORDINATION
Maxine with SNF [facility] called CM, CM updated that auth for SNF started today.   Maxine said they will run benefits to make sure Medicaid is in place, to see if they will be able to take patient for straight LTC benefits, in case auth is not approved.           Shawnee Mancera, RN  Case Management [phone]

## 2024-12-26 NOTE — CARE COORDINATION
NEHAL Valdez Served [facility] CM Specialist and asked for assistance starting auth for [facility], and that auth  note is in the chart.               Shawnee Mancera, RN  Case Management [phone]

## 2024-12-27 ENCOUNTER — PREP FOR PROCEDURE (OUTPATIENT)
Age: 84
End: 2024-12-27

## 2024-12-27 DIAGNOSIS — I73.9 PERIPHERAL VASCULAR DISEASE, UNSPECIFIED: ICD-10-CM

## 2024-12-27 DIAGNOSIS — I96 GANGRENE (HCC): ICD-10-CM

## 2024-12-27 LAB
ANION GAP SERPL CALC-SCNC: 6 MMOL/L (ref 3–18)
BASOPHILS # BLD: 0 K/UL (ref 0–0.1)
BASOPHILS NFR BLD: 0 % (ref 0–2)
BUN SERPL-MCNC: 14 MG/DL (ref 7–18)
BUN/CREAT SERPL: 18 (ref 12–20)
CALCIUM SERPL-MCNC: 8.6 MG/DL (ref 8.5–10.1)
CHLORIDE SERPL-SCNC: 108 MMOL/L (ref 100–111)
CO2 SERPL-SCNC: 28 MMOL/L (ref 21–32)
CREAT SERPL-MCNC: 0.76 MG/DL (ref 0.6–1.3)
DIFFERENTIAL METHOD BLD: ABNORMAL
EOSINOPHIL # BLD: 0 K/UL (ref 0–0.4)
EOSINOPHIL NFR BLD: 0 % (ref 0–5)
ERYTHROCYTE [DISTWIDTH] IN BLOOD BY AUTOMATED COUNT: 16.8 % (ref 11.6–14.5)
GLUCOSE SERPL-MCNC: 106 MG/DL (ref 74–99)
HCT VFR BLD AUTO: 36.2 % (ref 36–48)
HGB BLD-MCNC: 11.2 G/DL (ref 13–16)
IMM GRANULOCYTES # BLD AUTO: 0.1 K/UL (ref 0–0.04)
IMM GRANULOCYTES NFR BLD AUTO: 1 % (ref 0–0.5)
LYMPHOCYTES # BLD: 1.3 K/UL (ref 0.9–3.6)
LYMPHOCYTES NFR BLD: 11 % (ref 21–52)
MCH RBC QN AUTO: 27.9 PG (ref 24–34)
MCHC RBC AUTO-ENTMCNC: 30.9 G/DL (ref 31–37)
MCV RBC AUTO: 90 FL (ref 78–100)
MONOCYTES # BLD: 1.1 K/UL (ref 0.05–1.2)
MONOCYTES NFR BLD: 9 % (ref 3–10)
NEUTS SEG # BLD: 9 K/UL (ref 1.8–8)
NEUTS SEG NFR BLD: 79 % (ref 40–73)
NRBC # BLD: 0 K/UL (ref 0–0.01)
NRBC BLD-RTO: 0 PER 100 WBC
PLATELET # BLD AUTO: 108 K/UL (ref 135–420)
PMV BLD AUTO: 11.5 FL (ref 9.2–11.8)
POTASSIUM SERPL-SCNC: 3.7 MMOL/L (ref 3.5–5.5)
RBC # BLD AUTO: 4.02 M/UL (ref 4.35–5.65)
SODIUM SERPL-SCNC: 142 MMOL/L (ref 136–145)
WBC # BLD AUTO: 11.4 K/UL (ref 4.6–13.2)

## 2024-12-27 PROCEDURE — 99233 SBSQ HOSP IP/OBS HIGH 50: CPT | Performed by: HOSPITALIST

## 2024-12-27 PROCEDURE — 97535 SELF CARE MNGMENT TRAINING: CPT

## 2024-12-27 PROCEDURE — 99232 SBSQ HOSP IP/OBS MODERATE 35: CPT

## 2024-12-27 PROCEDURE — 80048 BASIC METABOLIC PNL TOTAL CA: CPT

## 2024-12-27 PROCEDURE — 6360000002 HC RX W HCPCS: Performed by: STUDENT IN AN ORGANIZED HEALTH CARE EDUCATION/TRAINING PROGRAM

## 2024-12-27 PROCEDURE — 1100000000 HC RM PRIVATE

## 2024-12-27 PROCEDURE — 6360000002 HC RX W HCPCS

## 2024-12-27 PROCEDURE — 85025 COMPLETE CBC W/AUTO DIFF WBC: CPT

## 2024-12-27 PROCEDURE — 94761 N-INVAS EAR/PLS OXIMETRY MLT: CPT

## 2024-12-27 PROCEDURE — 2500000003 HC RX 250 WO HCPCS

## 2024-12-27 PROCEDURE — 6370000000 HC RX 637 (ALT 250 FOR IP)

## 2024-12-27 PROCEDURE — 36415 COLL VENOUS BLD VENIPUNCTURE: CPT

## 2024-12-27 PROCEDURE — 6370000000 HC RX 637 (ALT 250 FOR IP): Performed by: HOSPITALIST

## 2024-12-27 PROCEDURE — 2580000003 HC RX 258

## 2024-12-27 PROCEDURE — 99223 1ST HOSP IP/OBS HIGH 75: CPT | Performed by: INTERNAL MEDICINE

## 2024-12-27 RX ORDER — MULTIVITAMIN WITH IRON
1 TABLET ORAL DAILY
Status: DISCONTINUED | OUTPATIENT
Start: 2024-12-27 | End: 2025-01-08 | Stop reason: HOSPADM

## 2024-12-27 RX ORDER — GAUZE BANDAGE 2" X 2"
100 BANDAGE TOPICAL DAILY
Status: DISCONTINUED | OUTPATIENT
Start: 2024-12-27 | End: 2025-01-08 | Stop reason: HOSPADM

## 2024-12-27 RX ADMIN — PIPERACILLIN AND TAZOBACTAM 3375 MG: 3; .375 INJECTION, POWDER, LYOPHILIZED, FOR SOLUTION INTRAVENOUS at 05:24

## 2024-12-27 RX ADMIN — PIPERACILLIN AND TAZOBACTAM 3375 MG: 3; .375 INJECTION, POWDER, LYOPHILIZED, FOR SOLUTION INTRAVENOUS at 19:53

## 2024-12-27 RX ADMIN — TRAZODONE HYDROCHLORIDE 50 MG: 50 TABLET ORAL at 20:40

## 2024-12-27 RX ADMIN — SODIUM CHLORIDE, PRESERVATIVE FREE 10 ML: 5 INJECTION INTRAVENOUS at 10:19

## 2024-12-27 RX ADMIN — ATENOLOL 50 MG: 50 TABLET ORAL at 10:11

## 2024-12-27 RX ADMIN — ROSUVASTATIN CALCIUM 40 MG: 20 TABLET, FILM COATED ORAL at 18:44

## 2024-12-27 RX ADMIN — FAMOTIDINE 20 MG: 20 TABLET ORAL at 10:11

## 2024-12-27 RX ADMIN — THIAMINE HCL TAB 100 MG 100 MG: 100 TAB at 16:24

## 2024-12-27 RX ADMIN — THERA TABS 1 TABLET: TAB at 16:24

## 2024-12-27 RX ADMIN — FAMOTIDINE 20 MG: 20 TABLET ORAL at 20:40

## 2024-12-27 RX ADMIN — PIPERACILLIN AND TAZOBACTAM 3375 MG: 3; .375 INJECTION, POWDER, LYOPHILIZED, FOR SOLUTION INTRAVENOUS at 13:26

## 2024-12-27 RX ADMIN — ENOXAPARIN SODIUM 40 MG: 100 INJECTION SUBCUTANEOUS at 10:10

## 2024-12-27 RX ADMIN — SODIUM CHLORIDE, PRESERVATIVE FREE 10 ML: 5 INJECTION INTRAVENOUS at 20:41

## 2024-12-27 RX ADMIN — SERTRALINE HYDROCHLORIDE 50 MG: 50 TABLET ORAL at 10:11

## 2024-12-27 ASSESSMENT — PAIN SCALES - GENERAL
PAINLEVEL_OUTOF10: 0

## 2024-12-27 NOTE — ACP (ADVANCE CARE PLANNING)
Palliative Medicine     Patient was seen at bedside for scheduled meeting roz araiza for 9am. He was sleeping and was awakened. He was not aware of place, his spouses name or time.  Only able to state his name. He was not able to have any type of goals of care conversation about surgery verses comfort focused care.     Trixie FREITASN, RN  Palliative Medicine Inpatient RN  Palliative COPE Line: [phone]

## 2024-12-27 NOTE — CARE COORDINATION
CM uploaded clinicals to [facility] for SNF [facility].   [facility] and updated that patient will be scheduled for Left AKA early next week.         Noted PLOF is on every therapy note.             Shawnee Mancera RN  Case Management [phone]

## 2024-12-27 NOTE — CARE COORDINATION
Sent 12/26/2024 18:49:40 on Home and community --   Will member DC on any IV abx? If so please provide dosage, frequency, end date; How much assistance did member require at baseline for transfers and ambulation? Sent pending auth question to CM to answer.

## 2024-12-27 NOTE — CARE COORDINATION
NEHAL Valdez Served [facility] CM Specialist and updated that per Vascular note, patient will be scheduled for Left AKA early next week.             Shawnee Mancera, RN  Case Management [phone]

## 2024-12-27 NOTE — CARE COORDINATION
Dayna has canceled the authorization request per tami's notes:    Sent 12/27/2024 15:14:55  Thanks for your message. I will withdraw this auth per your request. Thanks    Sent 12/27/2024 15:19:28  When member is ready for DC a new auth request with updated clinical will be needed. Thanks

## 2024-12-27 NOTE — CARE COORDINATION
NEHAL Valdez Served [facility] CM Specialist, that no mention of IV Antibiotics needed at discharge at this time, and that per MD patient is now agreeable to amputation, and that patient was not agreeable to amputation yesterday.               Shawnee Mancera RN  Case Management [phone]

## 2024-12-28 PROCEDURE — 6370000000 HC RX 637 (ALT 250 FOR IP): Performed by: STUDENT IN AN ORGANIZED HEALTH CARE EDUCATION/TRAINING PROGRAM

## 2024-12-28 PROCEDURE — 1100000000 HC RM PRIVATE

## 2024-12-28 PROCEDURE — 94761 N-INVAS EAR/PLS OXIMETRY MLT: CPT

## 2024-12-28 PROCEDURE — 6360000002 HC RX W HCPCS

## 2024-12-28 PROCEDURE — 2580000003 HC RX 258

## 2024-12-28 PROCEDURE — 99232 SBSQ HOSP IP/OBS MODERATE 35: CPT | Performed by: HOSPITALIST

## 2024-12-28 PROCEDURE — 6370000000 HC RX 637 (ALT 250 FOR IP): Performed by: HOSPITALIST

## 2024-12-28 PROCEDURE — 2500000003 HC RX 250 WO HCPCS

## 2024-12-28 PROCEDURE — 6370000000 HC RX 637 (ALT 250 FOR IP)

## 2024-12-28 RX ADMIN — THERA TABS 1 TABLET: TAB at 08:18

## 2024-12-28 RX ADMIN — SODIUM CHLORIDE, PRESERVATIVE FREE 10 ML: 5 INJECTION INTRAVENOUS at 08:17

## 2024-12-28 RX ADMIN — FAMOTIDINE 20 MG: 20 TABLET ORAL at 21:06

## 2024-12-28 RX ADMIN — PIPERACILLIN AND TAZOBACTAM 3375 MG: 3; .375 INJECTION, POWDER, LYOPHILIZED, FOR SOLUTION INTRAVENOUS at 13:03

## 2024-12-28 RX ADMIN — TRAZODONE HYDROCHLORIDE 50 MG: 50 TABLET ORAL at 21:06

## 2024-12-28 RX ADMIN — SODIUM CHLORIDE, PRESERVATIVE FREE 10 ML: 5 INJECTION INTRAVENOUS at 21:07

## 2024-12-28 RX ADMIN — PIPERACILLIN AND TAZOBACTAM 3375 MG: 3; .375 INJECTION, POWDER, LYOPHILIZED, FOR SOLUTION INTRAVENOUS at 21:15

## 2024-12-28 RX ADMIN — LEVOTHYROXINE SODIUM 50 MCG: 0.05 TABLET ORAL at 06:13

## 2024-12-28 RX ADMIN — THIAMINE HCL TAB 100 MG 100 MG: 100 TAB at 08:19

## 2024-12-28 RX ADMIN — ATENOLOL 50 MG: 50 TABLET ORAL at 08:18

## 2024-12-28 RX ADMIN — ACETAMINOPHEN 325MG 650 MG: 325 TABLET ORAL at 21:06

## 2024-12-28 RX ADMIN — SERTRALINE HYDROCHLORIDE 50 MG: 50 TABLET ORAL at 08:19

## 2024-12-28 RX ADMIN — FAMOTIDINE 20 MG: 20 TABLET ORAL at 08:19

## 2024-12-28 RX ADMIN — PIPERACILLIN AND TAZOBACTAM 3375 MG: 3; .375 INJECTION, POWDER, LYOPHILIZED, FOR SOLUTION INTRAVENOUS at 04:48

## 2024-12-28 RX ADMIN — ROSUVASTATIN CALCIUM 40 MG: 20 TABLET, FILM COATED ORAL at 18:33

## 2024-12-28 ASSESSMENT — PAIN SCALES - GENERAL
PAINLEVEL_OUTOF10: 0
PAINLEVEL_OUTOF10: 2
PAINLEVEL_OUTOF10: 0

## 2024-12-28 ASSESSMENT — PAIN DESCRIPTION - DESCRIPTORS: DESCRIPTORS: ACHING

## 2024-12-28 ASSESSMENT — PAIN DESCRIPTION - ONSET: ONSET: GRADUAL

## 2024-12-28 ASSESSMENT — PAIN DESCRIPTION - LOCATION: LOCATION: HEAD

## 2024-12-28 ASSESSMENT — PAIN - FUNCTIONAL ASSESSMENT
PAIN_FUNCTIONAL_ASSESSMENT: ACTIVITIES ARE NOT PREVENTED

## 2024-12-28 ASSESSMENT — PAIN DESCRIPTION - ORIENTATION: ORIENTATION: ANTERIOR

## 2024-12-28 ASSESSMENT — PAIN DESCRIPTION - FREQUENCY: FREQUENCY: INTERMITTENT

## 2024-12-29 PROCEDURE — 6360000002 HC RX W HCPCS

## 2024-12-29 PROCEDURE — 6370000000 HC RX 637 (ALT 250 FOR IP)

## 2024-12-29 PROCEDURE — 2580000003 HC RX 258

## 2024-12-29 PROCEDURE — 2500000003 HC RX 250 WO HCPCS

## 2024-12-29 PROCEDURE — 99232 SBSQ HOSP IP/OBS MODERATE 35: CPT | Performed by: HOSPITALIST

## 2024-12-29 PROCEDURE — 94761 N-INVAS EAR/PLS OXIMETRY MLT: CPT

## 2024-12-29 PROCEDURE — 1100000000 HC RM PRIVATE

## 2024-12-29 PROCEDURE — 6370000000 HC RX 637 (ALT 250 FOR IP): Performed by: HOSPITALIST

## 2024-12-29 PROCEDURE — 6360000002 HC RX W HCPCS: Performed by: STUDENT IN AN ORGANIZED HEALTH CARE EDUCATION/TRAINING PROGRAM

## 2024-12-29 RX ADMIN — FAMOTIDINE 20 MG: 20 TABLET ORAL at 09:11

## 2024-12-29 RX ADMIN — SODIUM CHLORIDE, PRESERVATIVE FREE 10 ML: 5 INJECTION INTRAVENOUS at 09:00

## 2024-12-29 RX ADMIN — TRAZODONE HYDROCHLORIDE 50 MG: 50 TABLET ORAL at 20:53

## 2024-12-29 RX ADMIN — FAMOTIDINE 20 MG: 20 TABLET ORAL at 20:53

## 2024-12-29 RX ADMIN — SERTRALINE HYDROCHLORIDE 50 MG: 50 TABLET ORAL at 09:11

## 2024-12-29 RX ADMIN — THIAMINE HCL TAB 100 MG 100 MG: 100 TAB at 09:10

## 2024-12-29 RX ADMIN — SODIUM CHLORIDE, PRESERVATIVE FREE 10 ML: 5 INJECTION INTRAVENOUS at 20:53

## 2024-12-29 RX ADMIN — LEVOTHYROXINE SODIUM 50 MCG: 0.05 TABLET ORAL at 05:07

## 2024-12-29 RX ADMIN — ENOXAPARIN SODIUM 40 MG: 100 INJECTION SUBCUTANEOUS at 09:11

## 2024-12-29 RX ADMIN — PIPERACILLIN AND TAZOBACTAM 3375 MG: 3; .375 INJECTION, POWDER, LYOPHILIZED, FOR SOLUTION INTRAVENOUS at 20:58

## 2024-12-29 RX ADMIN — THERA TABS 1 TABLET: TAB at 09:11

## 2024-12-29 RX ADMIN — PIPERACILLIN AND TAZOBACTAM 3375 MG: 3; .375 INJECTION, POWDER, LYOPHILIZED, FOR SOLUTION INTRAVENOUS at 11:55

## 2024-12-29 RX ADMIN — ATENOLOL 50 MG: 50 TABLET ORAL at 09:11

## 2024-12-29 RX ADMIN — ROSUVASTATIN CALCIUM 40 MG: 20 TABLET, FILM COATED ORAL at 17:51

## 2024-12-29 RX ADMIN — PIPERACILLIN AND TAZOBACTAM 3375 MG: 3; .375 INJECTION, POWDER, LYOPHILIZED, FOR SOLUTION INTRAVENOUS at 05:44

## 2024-12-29 ASSESSMENT — PAIN SCALES - GENERAL
PAINLEVEL_OUTOF10: 0

## 2024-12-29 ASSESSMENT — PAIN - FUNCTIONAL ASSESSMENT
PAIN_FUNCTIONAL_ASSESSMENT: ACTIVITIES ARE NOT PREVENTED

## 2024-12-30 ENCOUNTER — APPOINTMENT (OUTPATIENT)
Facility: HOSPITAL | Age: 84
DRG: 539 | End: 2024-12-30
Payer: MEDICARE

## 2024-12-30 ENCOUNTER — ANESTHESIA EVENT (OUTPATIENT)
Facility: HOSPITAL | Age: 84
DRG: 539 | End: 2024-12-30
Payer: MEDICARE

## 2024-12-30 LAB
ALBUMIN SERPL-MCNC: 2.3 G/DL (ref 3.4–5)
ALBUMIN/GLOB SERPL: 0.6 (ref 0.8–1.7)
ALP SERPL-CCNC: 82 U/L (ref 45–117)
ALT SERPL-CCNC: 34 U/L (ref 16–61)
ANION GAP SERPL CALC-SCNC: ABNORMAL MMOL/L (ref 3–18)
AST SERPL-CCNC: 24 U/L (ref 10–38)
BACTERIA SPEC CULT: NORMAL
BACTERIA SPEC CULT: NORMAL
BASOPHILS # BLD: 0 K/UL (ref 0–0.1)
BASOPHILS NFR BLD: 0 % (ref 0–2)
BILIRUB SERPL-MCNC: 0.3 MG/DL (ref 0.2–1)
BUN SERPL-MCNC: 17 MG/DL (ref 7–18)
BUN/CREAT SERPL: 25 (ref 12–20)
CALCIUM SERPL-MCNC: 8.9 MG/DL (ref 8.5–10.1)
CHLORIDE SERPL-SCNC: 105 MMOL/L (ref 100–111)
CO2 SERPL-SCNC: 37 MMOL/L (ref 21–32)
CREAT SERPL-MCNC: 0.68 MG/DL (ref 0.6–1.3)
DIFFERENTIAL METHOD BLD: ABNORMAL
EOSINOPHIL # BLD: 0 K/UL (ref 0–0.4)
EOSINOPHIL NFR BLD: 0 % (ref 0–5)
ERYTHROCYTE [DISTWIDTH] IN BLOOD BY AUTOMATED COUNT: 16.9 % (ref 11.6–14.5)
GLOBULIN SER CALC-MCNC: 4 G/DL (ref 2–4)
GLUCOSE SERPL-MCNC: 139 MG/DL (ref 74–99)
HCT VFR BLD AUTO: 38.7 % (ref 36–48)
HGB BLD-MCNC: 11.6 G/DL (ref 13–16)
IMM GRANULOCYTES # BLD AUTO: 0.1 K/UL (ref 0–0.04)
IMM GRANULOCYTES NFR BLD AUTO: 0 % (ref 0–0.5)
INR PPP: 1.3 (ref 0.9–1.1)
LYMPHOCYTES # BLD: 1.1 K/UL (ref 0.9–3.6)
LYMPHOCYTES NFR BLD: 9 % (ref 21–52)
MCH RBC QN AUTO: 27.2 PG (ref 24–34)
MCHC RBC AUTO-ENTMCNC: 30 G/DL (ref 31–37)
MCV RBC AUTO: 90.8 FL (ref 78–100)
MONOCYTES # BLD: 0.9 K/UL (ref 0.05–1.2)
MONOCYTES NFR BLD: 7 % (ref 3–10)
NEUTS SEG # BLD: 10.4 K/UL (ref 1.8–8)
NEUTS SEG NFR BLD: 83 % (ref 40–73)
NRBC # BLD: 0 K/UL (ref 0–0.01)
NRBC BLD-RTO: 0 PER 100 WBC
PLATELET # BLD AUTO: 145 K/UL (ref 135–420)
PMV BLD AUTO: 11.1 FL (ref 9.2–11.8)
POTASSIUM SERPL-SCNC: 3.7 MMOL/L (ref 3.5–5.5)
PROT SERPL-MCNC: 6.3 G/DL (ref 6.4–8.2)
PROTHROMBIN TIME: 16.3 SEC (ref 11.9–14.9)
RBC # BLD AUTO: 4.26 M/UL (ref 4.35–5.65)
SERVICE CMNT-IMP: NORMAL
SERVICE CMNT-IMP: NORMAL
SODIUM SERPL-SCNC: 141 MMOL/L (ref 136–145)
WBC # BLD AUTO: 12.6 K/UL (ref 4.6–13.2)

## 2024-12-30 PROCEDURE — 6360000002 HC RX W HCPCS

## 2024-12-30 PROCEDURE — 80053 COMPREHEN METABOLIC PANEL: CPT

## 2024-12-30 PROCEDURE — 99223 1ST HOSP IP/OBS HIGH 75: CPT | Performed by: ORTHOPAEDIC SURGERY

## 2024-12-30 PROCEDURE — 6370000000 HC RX 637 (ALT 250 FOR IP): Performed by: HOSPITALIST

## 2024-12-30 PROCEDURE — 2500000003 HC RX 250 WO HCPCS

## 2024-12-30 PROCEDURE — 2580000003 HC RX 258

## 2024-12-30 PROCEDURE — 85610 PROTHROMBIN TIME: CPT

## 2024-12-30 PROCEDURE — 99232 SBSQ HOSP IP/OBS MODERATE 35: CPT | Performed by: HOSPITALIST

## 2024-12-30 PROCEDURE — 6360000002 HC RX W HCPCS: Performed by: STUDENT IN AN ORGANIZED HEALTH CARE EDUCATION/TRAINING PROGRAM

## 2024-12-30 PROCEDURE — 85025 COMPLETE CBC W/AUTO DIFF WBC: CPT

## 2024-12-30 PROCEDURE — 36415 COLL VENOUS BLD VENIPUNCTURE: CPT

## 2024-12-30 PROCEDURE — 1100000000 HC RM PRIVATE

## 2024-12-30 PROCEDURE — 6370000000 HC RX 637 (ALT 250 FOR IP)

## 2024-12-30 PROCEDURE — 73552 X-RAY EXAM OF FEMUR 2/>: CPT

## 2024-12-30 PROCEDURE — 6370000000 HC RX 637 (ALT 250 FOR IP): Performed by: STUDENT IN AN ORGANIZED HEALTH CARE EDUCATION/TRAINING PROGRAM

## 2024-12-30 RX ADMIN — ACETAMINOPHEN 325MG 650 MG: 325 TABLET ORAL at 00:19

## 2024-12-30 RX ADMIN — ENOXAPARIN SODIUM 40 MG: 100 INJECTION SUBCUTANEOUS at 08:56

## 2024-12-30 RX ADMIN — SODIUM CHLORIDE, PRESERVATIVE FREE 10 ML: 5 INJECTION INTRAVENOUS at 20:27

## 2024-12-30 RX ADMIN — THERA TABS 1 TABLET: TAB at 08:56

## 2024-12-30 RX ADMIN — FAMOTIDINE 20 MG: 20 TABLET ORAL at 08:56

## 2024-12-30 RX ADMIN — SERTRALINE HYDROCHLORIDE 50 MG: 50 TABLET ORAL at 08:56

## 2024-12-30 RX ADMIN — THIAMINE HCL TAB 100 MG 100 MG: 100 TAB at 08:56

## 2024-12-30 RX ADMIN — PIPERACILLIN AND TAZOBACTAM 3375 MG: 3; .375 INJECTION, POWDER, LYOPHILIZED, FOR SOLUTION INTRAVENOUS at 04:36

## 2024-12-30 RX ADMIN — LEVOTHYROXINE SODIUM 50 MCG: 0.05 TABLET ORAL at 05:12

## 2024-12-30 RX ADMIN — PIPERACILLIN AND TAZOBACTAM 3375 MG: 3; .375 INJECTION, POWDER, LYOPHILIZED, FOR SOLUTION INTRAVENOUS at 14:54

## 2024-12-30 RX ADMIN — ROSUVASTATIN CALCIUM 40 MG: 20 TABLET, FILM COATED ORAL at 17:21

## 2024-12-30 RX ADMIN — ATENOLOL 50 MG: 50 TABLET ORAL at 08:56

## 2024-12-30 RX ADMIN — SODIUM CHLORIDE, PRESERVATIVE FREE 10 ML: 5 INJECTION INTRAVENOUS at 08:57

## 2024-12-30 RX ADMIN — PIPERACILLIN AND TAZOBACTAM 3375 MG: 3; .375 INJECTION, POWDER, LYOPHILIZED, FOR SOLUTION INTRAVENOUS at 20:27

## 2024-12-30 ASSESSMENT — PAIN DESCRIPTION - LOCATION: LOCATION: FOOT

## 2024-12-30 ASSESSMENT — PAIN SCALES - GENERAL
PAINLEVEL_OUTOF10: 0
PAINLEVEL_OUTOF10: 3
PAINLEVEL_OUTOF10: 0

## 2024-12-30 ASSESSMENT — PAIN DESCRIPTION - PAIN TYPE: TYPE: ACUTE PAIN

## 2024-12-30 ASSESSMENT — PAIN - FUNCTIONAL ASSESSMENT
PAIN_FUNCTIONAL_ASSESSMENT: ACTIVITIES ARE NOT PREVENTED

## 2024-12-30 ASSESSMENT — PAIN DESCRIPTION - ORIENTATION: ORIENTATION: LEFT

## 2024-12-30 ASSESSMENT — PAIN DESCRIPTION - FREQUENCY: FREQUENCY: INTERMITTENT

## 2024-12-30 ASSESSMENT — PAIN DESCRIPTION - DESCRIPTORS: DESCRIPTORS: ACHING

## 2024-12-30 ASSESSMENT — PAIN DESCRIPTION - ONSET: ONSET: GRADUAL

## 2024-12-31 ENCOUNTER — ANESTHESIA (OUTPATIENT)
Facility: HOSPITAL | Age: 84
DRG: 539 | End: 2024-12-31
Payer: MEDICARE

## 2024-12-31 ENCOUNTER — APPOINTMENT (OUTPATIENT)
Facility: HOSPITAL | Age: 84
DRG: 539 | End: 2024-12-31
Payer: MEDICARE

## 2024-12-31 LAB
ABO + RH BLD: NORMAL
BLOOD GROUP ANTIBODIES SERPL: NORMAL
SPECIMEN EXP DATE BLD: NORMAL

## 2024-12-31 PROCEDURE — 6370000000 HC RX 637 (ALT 250 FOR IP): Performed by: HOSPITALIST

## 2024-12-31 PROCEDURE — 6370000000 HC RX 637 (ALT 250 FOR IP): Performed by: NURSE ANESTHETIST, CERTIFIED REGISTERED

## 2024-12-31 PROCEDURE — 86900 BLOOD TYPING SEROLOGIC ABO: CPT

## 2024-12-31 PROCEDURE — 6360000002 HC RX W HCPCS

## 2024-12-31 PROCEDURE — 99232 SBSQ HOSP IP/OBS MODERATE 35: CPT | Performed by: EMERGENCY MEDICINE

## 2024-12-31 PROCEDURE — 1100000000 HC RM PRIVATE

## 2024-12-31 PROCEDURE — 86850 RBC ANTIBODY SCREEN: CPT

## 2024-12-31 PROCEDURE — 36415 COLL VENOUS BLD VENIPUNCTURE: CPT

## 2024-12-31 PROCEDURE — 2580000003 HC RX 258

## 2024-12-31 PROCEDURE — 86901 BLOOD TYPING SEROLOGIC RH(D): CPT

## 2024-12-31 PROCEDURE — 99233 SBSQ HOSP IP/OBS HIGH 50: CPT | Performed by: ORTHOPAEDIC SURGERY

## 2024-12-31 PROCEDURE — 6370000000 HC RX 637 (ALT 250 FOR IP)

## 2024-12-31 PROCEDURE — 2500000003 HC RX 250 WO HCPCS

## 2024-12-31 RX ORDER — SODIUM CHLORIDE, SODIUM LACTATE, POTASSIUM CHLORIDE, CALCIUM CHLORIDE 600; 310; 30; 20 MG/100ML; MG/100ML; MG/100ML; MG/100ML
INJECTION, SOLUTION INTRAVENOUS CONTINUOUS
Status: DISCONTINUED | OUTPATIENT
Start: 2024-12-31 | End: 2024-12-31 | Stop reason: HOSPADM

## 2024-12-31 RX ORDER — FAMOTIDINE 20 MG/1
20 TABLET, FILM COATED ORAL ONCE
Status: COMPLETED | OUTPATIENT
Start: 2024-12-31 | End: 2024-12-31

## 2024-12-31 RX ORDER — LIDOCAINE HYDROCHLORIDE 10 MG/ML
1 INJECTION, SOLUTION EPIDURAL; INFILTRATION; INTRACAUDAL; PERINEURAL
Status: DISCONTINUED | OUTPATIENT
Start: 2024-12-31 | End: 2024-12-31 | Stop reason: HOSPADM

## 2024-12-31 RX ORDER — SODIUM CHLORIDE 0.9 % (FLUSH) 0.9 %
5-40 SYRINGE (ML) INJECTION PRN
Status: DISCONTINUED | OUTPATIENT
Start: 2024-12-31 | End: 2024-12-31 | Stop reason: HOSPADM

## 2024-12-31 RX ADMIN — PIPERACILLIN AND TAZOBACTAM 3375 MG: 3; .375 INJECTION, POWDER, LYOPHILIZED, FOR SOLUTION INTRAVENOUS at 14:52

## 2024-12-31 RX ADMIN — FAMOTIDINE 20 MG: 20 TABLET ORAL at 20:57

## 2024-12-31 RX ADMIN — ROSUVASTATIN CALCIUM 40 MG: 20 TABLET, FILM COATED ORAL at 16:44

## 2024-12-31 RX ADMIN — LEVOTHYROXINE SODIUM 50 MCG: 0.05 TABLET ORAL at 06:03

## 2024-12-31 RX ADMIN — SODIUM CHLORIDE, PRESERVATIVE FREE 10 ML: 5 INJECTION INTRAVENOUS at 20:57

## 2024-12-31 RX ADMIN — TRAZODONE HYDROCHLORIDE 50 MG: 50 TABLET ORAL at 20:57

## 2024-12-31 RX ADMIN — THIAMINE HCL TAB 100 MG 100 MG: 100 TAB at 14:39

## 2024-12-31 RX ADMIN — ATENOLOL 50 MG: 50 TABLET ORAL at 14:40

## 2024-12-31 RX ADMIN — THERA TABS 1 TABLET: TAB at 14:39

## 2024-12-31 RX ADMIN — SODIUM CHLORIDE, PRESERVATIVE FREE 10 ML: 5 INJECTION INTRAVENOUS at 14:48

## 2024-12-31 RX ADMIN — PIPERACILLIN AND TAZOBACTAM 3375 MG: 3; .375 INJECTION, POWDER, LYOPHILIZED, FOR SOLUTION INTRAVENOUS at 06:05

## 2024-12-31 RX ADMIN — SERTRALINE HYDROCHLORIDE 50 MG: 50 TABLET ORAL at 14:48

## 2024-12-31 RX ADMIN — FAMOTIDINE 20 MG: 20 TABLET, FILM COATED ORAL at 08:27

## 2024-12-31 RX ADMIN — FAMOTIDINE 20 MG: 20 TABLET ORAL at 14:49

## 2024-12-31 RX ADMIN — PIPERACILLIN AND TAZOBACTAM 3375 MG: 3; .375 INJECTION, POWDER, LYOPHILIZED, FOR SOLUTION INTRAVENOUS at 22:23

## 2024-12-31 ASSESSMENT — PAIN SCALES - GENERAL
PAINLEVEL_OUTOF10: 0

## 2024-12-31 NOTE — PERIOP NOTE
During consent process in preop there were concerns regarding who would be giving consent for surgery. The patient had stated previously that his wife was to make all medical decisions, however the patient's son Ash had been contacted and stated that his mother is also unable to make medical decisions. He stated that he was listed as MPOA on paperwork that was never signed or notarized by the patient before he became confused.     Contacted Kerrie Rodriguez, Cortney Guillen, and Dr. Ruby Osei for guidance.    The procedure was cancelled by the surgeons, and the patient was transported back to his room by myself and Sirena Sheth RN. Bed in low position, wheels locked, and bed alarm activated. Call bell within reach. Bedside report given to OSVALDO Hercules.

## 2024-12-31 NOTE — CARE COORDINATION
-CM called patient's wife Dinorah Begum [phone], CM received voicemail, CM left message for a return call, and to see if patient's wife if making medical decisions for patient.   CM left phone number for a return call.         CM called [facility] [phone], and spoke with Yuriy, and requested nursing station unit 100.   CM transferred to [address], rang for a few minutes, then busy signal.   CM updated on call CM for Wednesday.           Shawnee Mancera, RN  Case Management [phone]

## 2024-12-31 NOTE — CARE COORDINATION
CM called Tonya in ARU # [id number], asked if they can review patient for possible admission for ARU per Dr Howard.             Shawnee Mancera, RN  Case Management [phone]

## 2024-12-31 NOTE — CARE COORDINATION
NEHAL received a message that Nisreen with [facility] wanted to speak with CM.       CM called Nisreen with [facility] [phone], CM received voicemail, CM let Nisreen know that CM is returning phone call, and CM is not sure when patient will be ready for discharge at this time.   CM left CM phone number for return call if needed.             Shawnee Mancera, RN  Case Management [phone]

## 2024-12-31 NOTE — CARE COORDINATION
CM called and spoke with Ms Silver with [facility] [phone], Ms Nadeem said she has met with patient's wife, but is unsure if patient's wife has capacity to make medical decisions for patient.       CM called patient's wife Dinorah Begum [phone], CM received voicemail, CM left message, trying to confirm if patient's wife is making medical decisions for patient.   CM left phone number for a return call.         CM called [facility] [phone], and spoke with Skylar, Skylar said patient's wife is in Room 116 at the [facility], and Skylar transferred CM to the 100 Unit Nurses Station at [facility], for CM to try to speak with patient's wife.   CM on hold for over 15 minutes, CM will follow up.               Shawnee Mancera, RN  Case Management [phone]

## 2024-12-31 NOTE — PERIOP NOTE
Case cancelled as patient was not able to provide consent and wife was unable to be contacted. Patient returned to [address] [place] [address] by this RN and bedside report given to Diomedes RILEY.

## 2025-01-01 LAB
ANION GAP SERPL CALC-SCNC: 3 MMOL/L (ref 3–18)
BASOPHILS # BLD: 0.1 K/UL (ref 0–0.1)
BASOPHILS NFR BLD: 0 % (ref 0–2)
BUN SERPL-MCNC: 18 MG/DL (ref 7–18)
BUN/CREAT SERPL: 25 (ref 12–20)
CALCIUM SERPL-MCNC: 8.9 MG/DL (ref 8.5–10.1)
CHLORIDE SERPL-SCNC: 104 MMOL/L (ref 100–111)
CO2 SERPL-SCNC: 31 MMOL/L (ref 21–32)
CREAT SERPL-MCNC: 0.71 MG/DL (ref 0.6–1.3)
DIFFERENTIAL METHOD BLD: ABNORMAL
EOSINOPHIL # BLD: 0 K/UL (ref 0–0.4)
EOSINOPHIL NFR BLD: 0 % (ref 0–5)
ERYTHROCYTE [DISTWIDTH] IN BLOOD BY AUTOMATED COUNT: 16.7 % (ref 11.6–14.5)
GLUCOSE SERPL-MCNC: 108 MG/DL (ref 74–99)
HCT VFR BLD AUTO: 38 % (ref 36–48)
HGB BLD-MCNC: 11.4 G/DL (ref 13–16)
IMM GRANULOCYTES # BLD AUTO: 0.1 K/UL (ref 0–0.04)
IMM GRANULOCYTES NFR BLD AUTO: 1 % (ref 0–0.5)
LYMPHOCYTES # BLD: 1.3 K/UL (ref 0.9–3.6)
LYMPHOCYTES NFR BLD: 10 % (ref 21–52)
MAGNESIUM SERPL-MCNC: 2 MG/DL (ref 1.6–2.6)
MCH RBC QN AUTO: 26.6 PG (ref 24–34)
MCHC RBC AUTO-ENTMCNC: 30 G/DL (ref 31–37)
MCV RBC AUTO: 88.6 FL (ref 78–100)
MONOCYTES # BLD: 1.1 K/UL (ref 0.05–1.2)
MONOCYTES NFR BLD: 8 % (ref 3–10)
NEUTS SEG # BLD: 11.2 K/UL (ref 1.8–8)
NEUTS SEG NFR BLD: 82 % (ref 40–73)
NRBC # BLD: 0 K/UL (ref 0–0.01)
NRBC BLD-RTO: 0 PER 100 WBC
PLATELET # BLD AUTO: 158 K/UL (ref 135–420)
PMV BLD AUTO: 10.7 FL (ref 9.2–11.8)
POTASSIUM SERPL-SCNC: 3.7 MMOL/L (ref 3.5–5.5)
RBC # BLD AUTO: 4.29 M/UL (ref 4.35–5.65)
SODIUM SERPL-SCNC: 138 MMOL/L (ref 136–145)
WBC # BLD AUTO: 13.7 K/UL (ref 4.6–13.2)

## 2025-01-01 PROCEDURE — 97164 PT RE-EVAL EST PLAN CARE: CPT

## 2025-01-01 PROCEDURE — 83735 ASSAY OF MAGNESIUM: CPT

## 2025-01-01 PROCEDURE — 1100000000 HC RM PRIVATE

## 2025-01-01 PROCEDURE — 6360000002 HC RX W HCPCS: Performed by: EMERGENCY MEDICINE

## 2025-01-01 PROCEDURE — 6360000002 HC RX W HCPCS: Performed by: ORTHOPAEDIC SURGERY

## 2025-01-01 PROCEDURE — 6360000002 HC RX W HCPCS

## 2025-01-01 PROCEDURE — 99232 SBSQ HOSP IP/OBS MODERATE 35: CPT | Performed by: EMERGENCY MEDICINE

## 2025-01-01 PROCEDURE — 36415 COLL VENOUS BLD VENIPUNCTURE: CPT

## 2025-01-01 PROCEDURE — 97166 OT EVAL MOD COMPLEX 45 MIN: CPT

## 2025-01-01 PROCEDURE — 97110 THERAPEUTIC EXERCISES: CPT

## 2025-01-01 PROCEDURE — 6370000000 HC RX 637 (ALT 250 FOR IP): Performed by: HOSPITALIST

## 2025-01-01 PROCEDURE — 6370000000 HC RX 637 (ALT 250 FOR IP): Performed by: STUDENT IN AN ORGANIZED HEALTH CARE EDUCATION/TRAINING PROGRAM

## 2025-01-01 PROCEDURE — 94761 N-INVAS EAR/PLS OXIMETRY MLT: CPT

## 2025-01-01 PROCEDURE — 80048 BASIC METABOLIC PNL TOTAL CA: CPT

## 2025-01-01 PROCEDURE — 97535 SELF CARE MNGMENT TRAINING: CPT

## 2025-01-01 PROCEDURE — 2500000003 HC RX 250 WO HCPCS

## 2025-01-01 PROCEDURE — 97168 OT RE-EVAL EST PLAN CARE: CPT

## 2025-01-01 PROCEDURE — 6370000000 HC RX 637 (ALT 250 FOR IP)

## 2025-01-01 PROCEDURE — 2580000003 HC RX 258: Performed by: EMERGENCY MEDICINE

## 2025-01-01 PROCEDURE — 2580000003 HC RX 258

## 2025-01-01 PROCEDURE — 85025 COMPLETE CBC W/AUTO DIFF WBC: CPT

## 2025-01-01 RX ORDER — VANCOMYCIN 1.75 G/350ML
1250 INJECTION, SOLUTION INTRAVENOUS
Status: DISCONTINUED | OUTPATIENT
Start: 2025-01-02 | End: 2025-01-04

## 2025-01-01 RX ADMIN — SODIUM CHLORIDE, PRESERVATIVE FREE 10 ML: 5 INJECTION INTRAVENOUS at 20:24

## 2025-01-01 RX ADMIN — ACETAMINOPHEN 325MG 650 MG: 325 TABLET ORAL at 20:24

## 2025-01-01 RX ADMIN — LEVOTHYROXINE SODIUM 50 MCG: 0.05 TABLET ORAL at 06:37

## 2025-01-01 RX ADMIN — THERA TABS 1 TABLET: TAB at 09:54

## 2025-01-01 RX ADMIN — ENOXAPARIN SODIUM 40 MG: 100 INJECTION SUBCUTANEOUS at 09:54

## 2025-01-01 RX ADMIN — PIPERACILLIN AND TAZOBACTAM 3375 MG: 3; .375 INJECTION, POWDER, LYOPHILIZED, FOR SOLUTION INTRAVENOUS at 22:12

## 2025-01-01 RX ADMIN — VANCOMYCIN HYDROCHLORIDE 1500 MG: 10 INJECTION, POWDER, LYOPHILIZED, FOR SOLUTION INTRAVENOUS at 15:52

## 2025-01-01 RX ADMIN — THIAMINE HCL TAB 100 MG 100 MG: 100 TAB at 09:54

## 2025-01-01 RX ADMIN — ROSUVASTATIN CALCIUM 40 MG: 20 TABLET, FILM COATED ORAL at 17:27

## 2025-01-01 RX ADMIN — FAMOTIDINE 20 MG: 20 TABLET ORAL at 20:24

## 2025-01-01 RX ADMIN — SODIUM CHLORIDE, PRESERVATIVE FREE 10 ML: 5 INJECTION INTRAVENOUS at 09:54

## 2025-01-01 RX ADMIN — SERTRALINE HYDROCHLORIDE 50 MG: 50 TABLET ORAL at 09:54

## 2025-01-01 RX ADMIN — TRAZODONE HYDROCHLORIDE 50 MG: 50 TABLET ORAL at 20:24

## 2025-01-01 RX ADMIN — ATENOLOL 50 MG: 50 TABLET ORAL at 09:54

## 2025-01-01 RX ADMIN — FAMOTIDINE 20 MG: 20 TABLET ORAL at 09:54

## 2025-01-01 RX ADMIN — PIPERACILLIN AND TAZOBACTAM 3375 MG: 3; .375 INJECTION, POWDER, LYOPHILIZED, FOR SOLUTION INTRAVENOUS at 06:38

## 2025-01-01 RX ADMIN — PIPERACILLIN AND TAZOBACTAM 3375 MG: 3; .375 INJECTION, POWDER, LYOPHILIZED, FOR SOLUTION INTRAVENOUS at 15:04

## 2025-01-01 ASSESSMENT — PAIN DESCRIPTION - ONSET: ONSET: GRADUAL

## 2025-01-01 ASSESSMENT — PAIN DESCRIPTION - LOCATION: LOCATION: HEAD

## 2025-01-01 ASSESSMENT — PAIN SCALES - GENERAL
PAINLEVEL_OUTOF10: 0
PAINLEVEL_OUTOF10: 2
PAINLEVEL_OUTOF10: 0

## 2025-01-01 ASSESSMENT — PAIN - FUNCTIONAL ASSESSMENT
PAIN_FUNCTIONAL_ASSESSMENT: ACTIVITIES ARE NOT PREVENTED

## 2025-01-01 ASSESSMENT — PAIN DESCRIPTION - FREQUENCY: FREQUENCY: INTERMITTENT

## 2025-01-01 ASSESSMENT — PAIN DESCRIPTION - ORIENTATION: ORIENTATION: ANTERIOR

## 2025-01-01 ASSESSMENT — PAIN DESCRIPTION - DESCRIPTORS: DESCRIPTORS: ACHING

## 2025-01-02 LAB
ALBUMIN SERPL-MCNC: 2.3 G/DL (ref 3.4–5)
ALBUMIN/GLOB SERPL: 0.6 (ref 0.8–1.7)
ALP SERPL-CCNC: 93 U/L (ref 45–117)
ALT SERPL-CCNC: 47 U/L (ref 16–61)
ANION GAP SERPL CALC-SCNC: 4 MMOL/L (ref 3–18)
AST SERPL-CCNC: 23 U/L (ref 10–38)
BASOPHILS # BLD: 0 K/UL (ref 0–0.1)
BASOPHILS NFR BLD: 0 % (ref 0–2)
BILIRUB SERPL-MCNC: 0.7 MG/DL (ref 0.2–1)
BUN SERPL-MCNC: 16 MG/DL (ref 7–18)
BUN/CREAT SERPL: 23 (ref 12–20)
CALCIUM SERPL-MCNC: 8.8 MG/DL (ref 8.5–10.1)
CHLORIDE SERPL-SCNC: 106 MMOL/L (ref 100–111)
CO2 SERPL-SCNC: 31 MMOL/L (ref 21–32)
CREAT SERPL-MCNC: 0.7 MG/DL (ref 0.6–1.3)
DIFFERENTIAL METHOD BLD: ABNORMAL
EOSINOPHIL # BLD: 0 K/UL (ref 0–0.4)
EOSINOPHIL NFR BLD: 0 % (ref 0–5)
ERYTHROCYTE [DISTWIDTH] IN BLOOD BY AUTOMATED COUNT: 16.7 % (ref 11.6–14.5)
GLOBULIN SER CALC-MCNC: 4.1 G/DL (ref 2–4)
GLUCOSE SERPL-MCNC: 108 MG/DL (ref 74–99)
HCT VFR BLD AUTO: 39 % (ref 36–48)
HGB BLD-MCNC: 11.9 G/DL (ref 13–16)
IMM GRANULOCYTES # BLD AUTO: 0.1 K/UL (ref 0–0.04)
IMM GRANULOCYTES NFR BLD AUTO: 1 % (ref 0–0.5)
LYMPHOCYTES # BLD: 1.3 K/UL (ref 0.9–3.6)
LYMPHOCYTES NFR BLD: 9 % (ref 21–52)
MCH RBC QN AUTO: 27.2 PG (ref 24–34)
MCHC RBC AUTO-ENTMCNC: 30.5 G/DL (ref 31–37)
MCV RBC AUTO: 89 FL (ref 78–100)
MONOCYTES # BLD: 1.1 K/UL (ref 0.05–1.2)
MONOCYTES NFR BLD: 8 % (ref 3–10)
NEUTS SEG # BLD: 11.3 K/UL (ref 1.8–8)
NEUTS SEG NFR BLD: 82 % (ref 40–73)
NRBC # BLD: 0 K/UL (ref 0–0.01)
NRBC BLD-RTO: 0 PER 100 WBC
PLATELET # BLD AUTO: 162 K/UL (ref 135–420)
PMV BLD AUTO: 10.8 FL (ref 9.2–11.8)
POTASSIUM SERPL-SCNC: 3.6 MMOL/L (ref 3.5–5.5)
PROT SERPL-MCNC: 6.4 G/DL (ref 6.4–8.2)
RBC # BLD AUTO: 4.38 M/UL (ref 4.35–5.65)
SODIUM SERPL-SCNC: 141 MMOL/L (ref 136–145)
WBC # BLD AUTO: 13.8 K/UL (ref 4.6–13.2)

## 2025-01-02 PROCEDURE — 6370000000 HC RX 637 (ALT 250 FOR IP): Performed by: STUDENT IN AN ORGANIZED HEALTH CARE EDUCATION/TRAINING PROGRAM

## 2025-01-02 PROCEDURE — 85025 COMPLETE CBC W/AUTO DIFF WBC: CPT

## 2025-01-02 PROCEDURE — 6370000000 HC RX 637 (ALT 250 FOR IP)

## 2025-01-02 PROCEDURE — 2500000003 HC RX 250 WO HCPCS

## 2025-01-02 PROCEDURE — 6360000002 HC RX W HCPCS: Performed by: ORTHOPAEDIC SURGERY

## 2025-01-02 PROCEDURE — 6370000000 HC RX 637 (ALT 250 FOR IP): Performed by: HOSPITALIST

## 2025-01-02 PROCEDURE — 6360000002 HC RX W HCPCS

## 2025-01-02 PROCEDURE — 2580000003 HC RX 258

## 2025-01-02 PROCEDURE — 36415 COLL VENOUS BLD VENIPUNCTURE: CPT

## 2025-01-02 PROCEDURE — 1100000000 HC RM PRIVATE

## 2025-01-02 PROCEDURE — 6360000002 HC RX W HCPCS: Performed by: EMERGENCY MEDICINE

## 2025-01-02 PROCEDURE — 99232 SBSQ HOSP IP/OBS MODERATE 35: CPT | Performed by: HOSPITALIST

## 2025-01-02 PROCEDURE — 80053 COMPREHEN METABOLIC PANEL: CPT

## 2025-01-02 RX ADMIN — THERA TABS 1 TABLET: TAB at 09:14

## 2025-01-02 RX ADMIN — ENOXAPARIN SODIUM 40 MG: 100 INJECTION SUBCUTANEOUS at 09:14

## 2025-01-02 RX ADMIN — TRAZODONE HYDROCHLORIDE 50 MG: 50 TABLET ORAL at 20:14

## 2025-01-02 RX ADMIN — FAMOTIDINE 20 MG: 20 TABLET ORAL at 20:14

## 2025-01-02 RX ADMIN — ROSUVASTATIN CALCIUM 40 MG: 20 TABLET, FILM COATED ORAL at 16:54

## 2025-01-02 RX ADMIN — LEVOTHYROXINE SODIUM 50 MCG: 0.05 TABLET ORAL at 05:06

## 2025-01-02 RX ADMIN — SODIUM CHLORIDE, PRESERVATIVE FREE 10 ML: 5 INJECTION INTRAVENOUS at 20:14

## 2025-01-02 RX ADMIN — VANCOMYCIN 1250 MG: 1.75 INJECTION, SOLUTION INTRAVENOUS at 10:54

## 2025-01-02 RX ADMIN — PIPERACILLIN AND TAZOBACTAM 3375 MG: 3; .375 INJECTION, POWDER, LYOPHILIZED, FOR SOLUTION INTRAVENOUS at 21:12

## 2025-01-02 RX ADMIN — SERTRALINE HYDROCHLORIDE 50 MG: 50 TABLET ORAL at 09:14

## 2025-01-02 RX ADMIN — PIPERACILLIN AND TAZOBACTAM 3375 MG: 3; .375 INJECTION, POWDER, LYOPHILIZED, FOR SOLUTION INTRAVENOUS at 06:27

## 2025-01-02 RX ADMIN — FAMOTIDINE 20 MG: 20 TABLET ORAL at 09:14

## 2025-01-02 RX ADMIN — PIPERACILLIN AND TAZOBACTAM 3375 MG: 3; .375 INJECTION, POWDER, LYOPHILIZED, FOR SOLUTION INTRAVENOUS at 14:15

## 2025-01-02 RX ADMIN — ATENOLOL 50 MG: 50 TABLET ORAL at 09:14

## 2025-01-02 RX ADMIN — ACETAMINOPHEN 325MG 650 MG: 325 TABLET ORAL at 20:14

## 2025-01-02 RX ADMIN — SODIUM CHLORIDE, PRESERVATIVE FREE 10 ML: 5 INJECTION INTRAVENOUS at 09:15

## 2025-01-02 RX ADMIN — THIAMINE HCL TAB 100 MG 100 MG: 100 TAB at 09:14

## 2025-01-02 ASSESSMENT — PAIN DESCRIPTION - DESCRIPTORS: DESCRIPTORS: ACHING;SORE

## 2025-01-02 ASSESSMENT — PAIN SCALES - GENERAL
PAINLEVEL_OUTOF10: 0
PAINLEVEL_OUTOF10: 3
PAINLEVEL_OUTOF10: 0

## 2025-01-02 ASSESSMENT — PAIN DESCRIPTION - ONSET: ONSET: GRADUAL

## 2025-01-02 ASSESSMENT — PAIN DESCRIPTION - PAIN TYPE: TYPE: ACUTE PAIN;CHRONIC PAIN

## 2025-01-02 ASSESSMENT — PAIN - FUNCTIONAL ASSESSMENT
PAIN_FUNCTIONAL_ASSESSMENT: ACTIVITIES ARE NOT PREVENTED

## 2025-01-02 ASSESSMENT — PAIN DESCRIPTION - LOCATION: LOCATION: FOOT

## 2025-01-02 ASSESSMENT — PAIN DESCRIPTION - FREQUENCY: FREQUENCY: INTERMITTENT

## 2025-01-02 ASSESSMENT — PAIN DESCRIPTION - ORIENTATION: ORIENTATION: LEFT

## 2025-01-02 NOTE — CARE COORDINATION
JARED was on the call along side CM Director, when she spoke with the patient at [facility]. Which included Admissions coordinator and DON.     CM Director explained the reasoning for her call as stated in the note. The patient provided her contact number and informed everyone she is the point of contact and prefers to update her sons of any information if needed.      JARED Valdez served the provider with Mrs. Begum contact information.     JOE Gonsalez

## 2025-01-02 NOTE — CARE COORDINATION
Called [facility] and attempted to reach the .  Was transferred to the admissions coordinator.  She advised that she would bring Ms. Begum to her office for a discussion, but then advised that the patient had just been placed into bed by therapy and asked that a call be placed to her cell phone.  This writer requested that a nursing supervisor be involved in the conversation and she reached out to the Director of Nursing (DON).    This writer then spoke with Ms. Begum in the presence of the admissions coordinator and CLARY and Ms. Begum verified patient's name and  when asked.  She was advised that the hospital was attempting to determine a decision maker.  She verified that she was the decision maker and that she would like to communicate information to their children as it was needed.  She cited her cell phone number as [phone] and stated that she could be reached on that number.    She then asked about what procedure was needed and was asked if that information could be shared in the presence of the admissions coordinator.  She consented.  She was advised of the need for consent and she asked if they were going to cut \"the foot or the leg\" from the patient.  She was advised that this writer was not aware but that a doctor would reach out with more information.  She also asked if he was going to be coming to the nursing facility tomorrow.  She also said something about a past discussion between she and her spouse that they didn't know \"how to die\".    She advised that if the patient was confused that she could probably help understand what he was trying to communicate citing 66 years of marriage.   At the end of the conversation, she asked which hospital he was in and was advised that he was at [facility].

## 2025-01-02 NOTE — NURSE NAVIGATOR
Met with patient seen at bedside. provided education on living with heart failure, reinforcing low sodium diet, fluid restriction, heart failure zones, will continue to follow. Scale.

## 2025-01-03 PROBLEM — R41.0 DELIRIUM: Status: ACTIVE | Noted: 2025-01-03

## 2025-01-03 LAB — VANCOMYCIN SERPL-MCNC: 33.8 UG/ML (ref 5–40)

## 2025-01-03 PROCEDURE — 99232 SBSQ HOSP IP/OBS MODERATE 35: CPT | Performed by: HOSPITALIST

## 2025-01-03 PROCEDURE — 6370000000 HC RX 637 (ALT 250 FOR IP): Performed by: STUDENT IN AN ORGANIZED HEALTH CARE EDUCATION/TRAINING PROGRAM

## 2025-01-03 PROCEDURE — 6360000002 HC RX W HCPCS

## 2025-01-03 PROCEDURE — 6360000002 HC RX W HCPCS: Performed by: EMERGENCY MEDICINE

## 2025-01-03 PROCEDURE — 99221 1ST HOSP IP/OBS SF/LOW 40: CPT | Performed by: PSYCHIATRY & NEUROLOGY

## 2025-01-03 PROCEDURE — 2580000003 HC RX 258

## 2025-01-03 PROCEDURE — 6370000000 HC RX 637 (ALT 250 FOR IP)

## 2025-01-03 PROCEDURE — 2500000003 HC RX 250 WO HCPCS

## 2025-01-03 PROCEDURE — 80202 ASSAY OF VANCOMYCIN: CPT

## 2025-01-03 PROCEDURE — 36415 COLL VENOUS BLD VENIPUNCTURE: CPT

## 2025-01-03 PROCEDURE — 6360000002 HC RX W HCPCS: Performed by: ORTHOPAEDIC SURGERY

## 2025-01-03 PROCEDURE — 1100000000 HC RM PRIVATE

## 2025-01-03 PROCEDURE — 97530 THERAPEUTIC ACTIVITIES: CPT

## 2025-01-03 PROCEDURE — 94761 N-INVAS EAR/PLS OXIMETRY MLT: CPT

## 2025-01-03 PROCEDURE — 6370000000 HC RX 637 (ALT 250 FOR IP): Performed by: HOSPITALIST

## 2025-01-03 PROCEDURE — 97535 SELF CARE MNGMENT TRAINING: CPT

## 2025-01-03 RX ORDER — TRAMADOL HYDROCHLORIDE 50 MG/1
50 TABLET ORAL EVERY 6 HOURS PRN
Status: DISCONTINUED | OUTPATIENT
Start: 2025-01-03 | End: 2025-01-04 | Stop reason: SDUPTHER

## 2025-01-03 RX ORDER — TRAMADOL HYDROCHLORIDE 50 MG/1
50 TABLET ORAL EVERY 6 HOURS PRN
Status: DISCONTINUED | OUTPATIENT
Start: 2025-01-03 | End: 2025-01-08 | Stop reason: HOSPADM

## 2025-01-03 RX ADMIN — VANCOMYCIN 1250 MG: 1.75 INJECTION, SOLUTION INTRAVENOUS at 04:20

## 2025-01-03 RX ADMIN — ENOXAPARIN SODIUM 40 MG: 100 INJECTION SUBCUTANEOUS at 08:06

## 2025-01-03 RX ADMIN — LEVOTHYROXINE SODIUM 50 MCG: 0.05 TABLET ORAL at 05:46

## 2025-01-03 RX ADMIN — FAMOTIDINE 20 MG: 20 TABLET ORAL at 22:04

## 2025-01-03 RX ADMIN — ATENOLOL 50 MG: 50 TABLET ORAL at 08:06

## 2025-01-03 RX ADMIN — FAMOTIDINE 20 MG: 20 TABLET ORAL at 08:06

## 2025-01-03 RX ADMIN — SERTRALINE HYDROCHLORIDE 50 MG: 50 TABLET ORAL at 08:06

## 2025-01-03 RX ADMIN — PIPERACILLIN AND TAZOBACTAM 3375 MG: 3; .375 INJECTION, POWDER, LYOPHILIZED, FOR SOLUTION INTRAVENOUS at 15:00

## 2025-01-03 RX ADMIN — VANCOMYCIN 1250 MG: 1.75 INJECTION, SOLUTION INTRAVENOUS at 22:14

## 2025-01-03 RX ADMIN — PIPERACILLIN AND TAZOBACTAM 3375 MG: 3; .375 INJECTION, POWDER, LYOPHILIZED, FOR SOLUTION INTRAVENOUS at 22:11

## 2025-01-03 RX ADMIN — SODIUM CHLORIDE, PRESERVATIVE FREE 10 ML: 5 INJECTION INTRAVENOUS at 22:17

## 2025-01-03 RX ADMIN — SODIUM CHLORIDE, PRESERVATIVE FREE 10 ML: 5 INJECTION INTRAVENOUS at 08:09

## 2025-01-03 RX ADMIN — THERA TABS 1 TABLET: TAB at 08:06

## 2025-01-03 RX ADMIN — TRAZODONE HYDROCHLORIDE 50 MG: 50 TABLET ORAL at 22:05

## 2025-01-03 RX ADMIN — PIPERACILLIN AND TAZOBACTAM 3375 MG: 3; .375 INJECTION, POWDER, LYOPHILIZED, FOR SOLUTION INTRAVENOUS at 05:53

## 2025-01-03 RX ADMIN — ROSUVASTATIN CALCIUM 40 MG: 20 TABLET, FILM COATED ORAL at 17:41

## 2025-01-03 RX ADMIN — ACETAMINOPHEN 325MG 650 MG: 325 TABLET ORAL at 08:06

## 2025-01-03 RX ADMIN — THIAMINE HCL TAB 100 MG 100 MG: 100 TAB at 08:06

## 2025-01-03 ASSESSMENT — PAIN SCALES - GENERAL
PAINLEVEL_OUTOF10: 0

## 2025-01-03 ASSESSMENT — PAIN - FUNCTIONAL ASSESSMENT: PAIN_FUNCTIONAL_ASSESSMENT: ACTIVITIES ARE NOT PREVENTED

## 2025-01-03 NOTE — CARE COORDINATION
SNF Authorization Request  1/3/2025, 1:18 PM    Patient Name: Stefano Begum Jr.                   YOB: 1940    Patient has been provided with freedom of choice and has chosen to go to SNF [facility].     SNF has confirmed that they can accept the patient and they have a bed Yes  SNF has confirmed that they are in network with the patient's insurance Yes    Please request authorization.    Estimated date of discharge is 01/04/2025.     Skilled Need    PT Yes   OT Yes   Wound Care  No  IV Medications Not noted if needed at discharge.   Trach No   Peg No     If PT/ OT/ Speech is required, evaluations/ notes have been updated within the last 48 hours Yes       Additional information NA    Payor: Payor: HUMANA MEDICARE / Plan: HU[employer]NA GOLD PLUS HMO / Product Type: *No Product type* /   Attending physician: Robb Marcelo MD Susan Flores  Case Management Department  Ph: [phone]

## 2025-01-03 NOTE — CONSULTS
[unfilled]   Consult    Patient: Stefano Begum Jr. MRN: [id number]  SSN: [id number]    YOB: 1940  Age: 84 y.o.  Sex: male      Subjective:      Stefano Begum Jr. is a 84 y.o. male who is being seen for asked to evaluate gangrene and infection of the left forefoot and heel.  Patient immediately indicated he does not want any treatment and wants palliative care. .    Past Medical History:   Diagnosis Date    BPH (benign prostatic hyperplasia) 12/24/2024    Chronic CHF (congestive heart failure) (HCC) 12/24/2024    Depression     History of atrial fibrillation 12/24/2024    Hyperlipidemia     Hypertension     Hypothyroidism 12/24/2024    Peripheral arterial disease (HCC) 12/24/2024    Peripheral arterial disease (HCC)      Past Surgical History:   Procedure Laterality Date    FOOT AMPUTATION THROUGH METATARSAL Left     Left foot all toes amputated      History reviewed. No pertinent family history.  Social History     Tobacco Use    Smoking status: Not on file    Smokeless tobacco: Not on file   Substance Use Topics    Alcohol use: Not on file      Current Facility-Administered Medications   Medication Dose Route Frequency Provider Last Rate Last Admin    enoxaparin (LOVENOX) injection 40 mg  40 mg SubCUTAneous Daily Enrique Chen, DO   40 mg at 12/26/24 0908    vancomycin (VANCOCIN) 1250 mg in 250 mL IVPB  1,250 mg IntraVENous Q24H Natalya Palmer MD   Stopped at 12/26/24 1332    sodium chloride flush 0.9 % injection 5-40 mL  5-40 mL IntraVENous 2 times per day Fanny Palmer APRN - NP   10 mL at 12/26/24 0908    sodium chloride flush 0.9 % injection 5-40 mL  5-40 mL IntraVENous PRN Fanny Palmer, APRN - NP        potassium chloride (KLOR-CON M) extended release tablet 40 mEq  40 mEq Oral PRN Fanny Palmer, APRN - NP   40 mEq at 12/25/24 0847    Or    potassium bicarb-citric acid (EFFER-K) effervescent tablet 40 mEq  40 mEq Oral PRN Spencer, Ranjithangi, APRN - NP        Or    potassium 
Behavioral Health consultation note    Admit Date: 12/23/2024      Vitals : Patient Vitals for the past 8 hrs:   BP Temp Temp src Pulse Resp SpO2   01/03/25 1544 (!) 141/87 97.3 °F (36.3 °C) Oral 63 16 93 %   01/03/25 1249 112/74 97.4 °F (36.3 °C) Oral 72 16 92 %     Labs:    Recent Results (from the past 24 hour(s))   Vancomycin Level, Random    Collection Time: 01/03/25  5:00 AM   Result Value Ref Range    Vancomycin Rm 33.8 5.0 - 40.0 UG/ML     Meds:   Current Facility-Administered Medications   Medication Dose Route Frequency    traMADol (ULTRAM) tablet 50 mg  50 mg Oral Q6H PRN    Or    traMADol (ULTRAM) tablet 50 mg  50 mg Oral Q6H PRN    vancomycin (VANCOCIN) 1250 mg in 250 mL IVPB  1,250 mg IntraVENous Q18H    multivitamin 1 tablet  1 tablet Oral Daily    thiamine mononitrate tablet 100 mg  100 mg Oral Daily    enoxaparin (LOVENOX) injection 40 mg  40 mg SubCUTAneous Daily    sodium chloride flush 0.9 % injection 5-40 mL  5-40 mL IntraVENous 2 times per day    sodium chloride flush 0.9 % injection 5-40 mL  5-40 mL IntraVENous PRN    potassium chloride (KLOR-CON M) extended release tablet 40 mEq  40 mEq Oral PRN    Or    potassium bicarb-citric acid (EFFER-K) effervescent tablet 40 mEq  40 mEq Oral PRN    Or    potassium chloride 10 mEq/100 mL IVPB (Peripheral Line)  10 mEq IntraVENous PRN    magnesium sulfate 2000 mg in 50 mL IVPB premix  2,000 mg IntraVENous PRN    ondansetron (ZOFRAN-ODT) disintegrating tablet 4 mg  4 mg Oral Q8H PRN    Or    ondansetron (ZOFRAN) injection 4 mg  4 mg IntraVENous Q6H PRN    polyethylene glycol (GLYCOLAX) packet 17 g  17 g Oral Daily PRN    famotidine (PEPCID) tablet 20 mg  20 mg Oral BID    piperacillin-tazobactam (ZOSYN) 3,375 mg in sodium chloride 0.9 % 50 mL IVPB (mini-bag)  3,375 mg IntraVENous Q8H    atenolol (TENORMIN) tablet 50 mg  50 mg Oral Daily    levothyroxine (SYNTHROID) tablet 50 mcg  50 mcg Oral Daily    rosuvastatin (CRESTOR) tablet 40 mg  40 mg Oral QPM    
Chart reviewed and left foot inspected.  Forefoot demarcation ,severe gangrene.  Heel gangrene.  Severe peripheral arterial disease.   Discussed with Dr Palmer/ID.  Palliative care.  Full Consult to follow.  
Clinical Ethics Consultation Note    History and Context:  Brief chart review conducted.  Length of Stay: 10  Valid medical advanced directives?: Yes      Process:  Ethics made aware of patient care concern related to decisional capacity for informed consent by perioperative clinical team member. Chart briefly reviewed and discussion of hospital clinical course provided by pre-op RN. Palliative care consult noted. Psychiatry consult findings also appreciated. Patient care coordination, including efforts to connect with patients legal next of kin, discussed at length with care management.       Recommendations:  Document formal capacity assessment of the patient. If patient deemed without capacity, seek decision-making by patient's spouse.  2.   Ensure attempts to contact the wife and are well documented, as well as the outcome of any discussion(s) with her.  3.   Patient reportedly advised he has four children. In the absence of the spouse's availability for decision-making as the LNOK, care management to reach out to the four children for consent (will need three to be in agreement) regarding goals of care and to establish a primary surrogate decision-maker. Consider a virtual meeting to include all four children, patient's attending provider, primary RN, representation from care management, patient's consulting surgeons (if available), member of the palliative care team, and  (if available / as warranted). Telephonic conference-call discussion also appropriate if deemed the best available communication conducive to all four siblings.    Closing:  Thank you for the opportunity to participate in this patient care planning. Kindly reach out to myself and/or ethics on-call consultant via [employer] with any additional questions or need for clarification.    JUSTIN Fernández  Clinical Ethics      Primary Ethical Theme: Decisional capacity   Secondary Ethical Themes: Clarify legal surrogate decision 
Comprehensive Nutrition Assessment    Type and Reason for Visit:  Reassess    Nutrition Recommendations/Plan:   Continue NPO, advance diet as medically feasible s/p procedure.   Once diet is advance, re-order oral supplements: Ensure Plus High Protein (each provides 350 kcal, 20g protein) TID  Continue multivitamin and thiamine supplementation daily  Monitor readiness for diet advancement, weight, labs, and plan of care while admitted.      Malnutrition Assessment:  Malnutrition Status:  Severe malnutrition (12/24/24 1419)    Context:  Social/Environmental Circumstances     Findings of the 6 clinical characteristics of malnutrition:  Energy Intake:  50% or less estimated energy requirements for 1 month or longer  Weight Loss:  10% over 6 months     Body Fat Loss:  Severe body fat loss Triceps   Muscle Mass Loss:  Severe muscle mass loss Temples (temporalis), Clavicles (pectoralis & deltoids), Scapula (trapezius)  Fluid Accumulation:  Unable to assess     Strength:  Not Performed    Nutrition Assessment:    Admitted for osteomyelitis of left lower extremity. Diet advanced to regular texture 12/25. Per flow sheets, meal intake <25% since previous RD assessment. No documentation for supplement intake. Pt currently NPO for left AKA surgery. consult noted for poor intake/appetite; RD team already following. Unable to speak to pt, ABELARDO to OR. Will monitor for diet advancement s/p procedure.    Nutrition Related Findings:    Pertinent Meds:   Pepcid  Synthroid  MVI  Zosyn  thiamine Pertinent Labs:  Recent Labs     12/30/24  1804   GLUCOSE 139*   BUN 17   CREATININE 0.68      K 3.7      CO2 37*   CALCIUM 8.9     No results for input(s): \"POCGLU\" in the last 72 hours.    Last BM: 12/30/24    Skin: Wound Type: Pressure Injury, Stage I (necrotic toes and heel to left foot)    Edema:    None   Edema Generalized: Trace     Current Nutrition Intake & Therapies:    Average Meal Intake: NPO (<25% prior to diet change 
Consulted for evaluation of left foot gangrene, associated with thrombosed left popliteal artery aneurysm-noted on CT angiogram of the left lower extremity    History obtained from the patient.  Medical records, labs and imaging reviewed.  The patient has history of gangrene of the left forefoot and toes, for the past 2 months.  History of pain in the left foot for the past 4 months.  History of PAD-right foot toes previously amputated, atrial fibrillation-not on anticoagulation secondary to frequent falls, CHF, and depression.  The patient lives with his wife, who is blind.  He has 2 sons and 2 daughters    Discussed with the patient, in the presence of the nurse taking care of him , regarding options of treatment for PAD with gangrene of the foot-including surgical revascularization, and major left lower extremity amputation, despite successful revascularization, given the extent of tissue loss.    He is DNR/DNI, and does not wish to undergo any interventions.  
Identifying information:  Stefano Begum Jr. is a 84 y.o. male with a PMHx of cellulitis, hypertension, right foot toes amputated, severe PAD, hyperlipidemia, A-fib not on oral anticoagulation due to recurrent falls, hypothyroidism, BPH, chronic CHF, depression who presented to the ED with complaints of left foot evaluation.  Patient states he has chronic left foot wound and was recently admitted at [facility].  States he was supposed to have home health nurse to come home and assist with dressing changes however they states nobody has showed up.  Patient states it has been about 2 to 3 weeks since the wound has not been evaluated.  States he is the primary caretaker of his wife who is blind.  Patient has not left his house in the past month and has been poorly eating and drinking.  Currently upon my evaluation, patient resting in bed in no apparent distress.  Denies any pain at this time.  Patient also states his wife is in the ED down the [address], has sustained a fall.  According to chart review, there is also concern for.  According to chart review patient stated he wanted to just lay down in his bed and die with his wife.  Has not left his house in the past month and has been poorly eating and drinking.,  Denies any associated chest pain, shortness of breath, fever, chills, nausea vomiting diarrhea, abdominal pain, headache, cough, dizziness.  Denies smoking drinking or illicit drug use.    As I approached the patient for the interview, I found him laying in bed comfortably, frail looking and tired.  As I called his name, he opened his eyes and I introduced myself.  I asked the patient that where he was, and he responded, \"at [facility].\"  I asked him why he came to the hospital and the patient reported that he has medical issues particularly a wound on the left foot which he needed help with.  Patient told me that he is 84 years old and lives at home.  He did report that he lives with his wife at home which appears to be 
Infectious Disease Consultation Note        Reason: left foot infection/necrosis    Current abx Prior abx   Pip/tazo, vancomycin since 12/23/24      Lines:       Assessment :  84 y.o. male with a PMHx of cellulitis, hypertension, right foot toes amputated, severe PAD, hyperlipidemia, A-fib not on oral anticoagulation due to recurrent falls, hypothyroidism, BPH, chronic CHF, depression who presented to the ED on 12/23/24 with complaints of left foot infection.      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   Patient will likely benefit from left below-knee amputation for definitive cure.  Discussed with patient.  He does not wish to pursue higher level of amputation and wishes to save his leg.  Await podiatry recommendations.    Thank you for consultation request. Above plan was discussed in details with patient, dr. Chen and dr Stewart. Please call me if any further questions or concerns. Will continue to participate in the care of this patient.    HPI:  84 y.o. male with a PMHx of cellulitis, hypertension, right foot toes amputated, severe PAD, hyperlipidemia, A-fib not on oral anticoagulation due to recurrent falls, hypothyroidism, BPH, chronic CHF, depression who presented to the ED on 12/23/24 with complaints of left foot infection.      After history by talking to patient, review of records.  Patient was admitted to [facility] 10/17/24-10/23/24 for nonhealing left lower extremity wounds, cellulitis of the left lower extremity.  There was concern for osteomyelitis of left second proximal phalanx.  He was evaluated by ID.  Recommendation was made to continue linezolid till 10/27/2024.  Patient states he was supposed to have 
Palliative Medicine Initial Consult  Patient Name: Stefano Begum Jr.  YOB: 1940  MRN: [id number]  Age: 84 y.o.  Gender: male    Date of Initial Consult: 12/24/2024   Date of Service: 12/24/2024  Time: 1:33 PM  Provider: [facility] Day: 2  Admit Date: 12/23/2024  Referring Provider: DR. Fanny Palmer       Reasons for Consultation:  Goals of Care    HISTORY OF PRESENT ILLNESS (HPI):   Stefano Begum Jr. is a 84 y.o. male with a past medical history of PAD, chronic CHF, HLD, HTN, right toes amputated, afib but not on anticoagulation due to frequent falls, BPH, who was admitted on 12/23/2024 from home via EMS with a diagnosis of osteomyelitis of the left lower extremity. Per chart review the patient's landlord called EMS after finding the patient and wife 'lying in their own excrements' for the past few weeks. Per chart review he has had a chronic left lower extremity infection and was recently admitted to [facility]. He has been awaiting home health to come and evaluate him. Per notes he has not been eating or drinking well. Lab work in ED revealed TSH of 10.20, albumin 2.7.  Palliative care was consulted for goals of care.    Psychosocial: He lives with wife Dinorah of whom he is primary caregiver for. He has 4 adult children who do not live in the area but out of state;  17 grandchildren, and 12 great-grandchildren. 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 He use to be an avid outdoors man, who loved 'the mountains and sports.'    12/24/2024 Patient resting comfortably in bed, A/O x4, on RA, mildly anxious, very thin and frail appearing, quickly pulled the sheet off his legs for us to see his necrotic left foot.      PALLIATIVE DIAGNOSES:    Encounter for palliative care  Goals of care  Osteomyelitis of left lower extremity  PAD  Chronic CHF  Dry gangrene  Moderate protein 
diameter is 3.8 cm.    Signed by: Flako Castillo MD on 12/25/2024  1:52 PM    No results found for this or any previous visit.    No results found for this or any previous visit.    Xray Result (most recent):  XR FOOT LEFT (2 VIEWS) 12/23/2024    Narrative  EXAM: Left Foot X-ray    INDICATION:  left foot pain    TECHNIQUE: 2 views of the left foot obtained.    COMPARISON: None.    FINDINGS: Generalized osteopenia is noted. The alignment of the foot is  unremarkable. No evidence of acute fracture or dislocation. The first MTP joint  has significant joint space narrowing and osteophyte formation and subchondral  sclerosis. There is no evidence of erosions or periostitis. No lytic or blastic  focus appreciated. Enthesophyte at the Achilles insertion is noted. There is  thickening of the Achilles tendon.    Impression  1.  No acute fracture or dislocation.  2.  Achilles tendinopathy  3.  Severe first MTP osteoarthritis.    Electronically signed by Nilton Crain      Signed By: Jacquelin Ni, APRN - NP     December 27, 2024        
unintentional.

## 2025-01-03 NOTE — CARE COORDINATION
Dr Marcelo let CM know that patient is refusing surgery, and asked that auth for SNF be started.           Shawnee Mancera, RN  Case Management [phone]

## 2025-01-03 NOTE — NURSE NAVIGATOR
Followed up with patient reinforced low sodium diet, fluid restriction, heart failure zones, will continue to follow    Message given that script was sent to pharmacy

## 2025-01-03 NOTE — CARE COORDINATION
Pre-cert Request   1/3/2025, 3:34 PM    Patient Name: Stefano Begum Jr.                   YOB: 1940      *ALERT - Authorization is pending review by the insurance company.  This is not an authorization.*    Submitted via tami.  Requested Facility:  [facility]  Anticipated Admit Date to [facility]:  01/04/2025  Pending Auth #:  [id number]  Pending Plan Auth ID:     Livier Lobo  Case Management Department  Ph: [phone] Fax: [phone]

## 2025-01-03 NOTE — CARE COORDINATION
Per previous CM notes, CM spoke with [facility] Ms Jesus, and was told that patient has Medicaid.       CM called the Medicaid Hotline [phone], and CM used patient's ssn and Medicaid #, patient does not have Medicaid.         CM called [facility] Ms Jesus [phone], CM received voicemail, CM left a message that CM checked Medicaid Hotline, with both SSN and Medicaid #, and was told patient does not have Medicaid.   CM left phone number for a return call.           Shawnee Mancera, RN  Case Management [phone]

## 2025-01-03 NOTE — CARE COORDINATION
NEHAL Valdez Served [facility] Specialist to assist with starting auth for SNF [facility], and that auth note for SNF is in patient's chart.             Shawnee Mancera, RN  Case Management [phone]

## 2025-01-03 NOTE — CARE COORDINATION
NEHAL called and spoke with Nisreen with SNF [facility], said they still have a bed for patient and can accept patient.   NEHAL let Nisreen know that auth for SNF to be started today.               Shawnee Mancera RN  Case Management [phone]

## 2025-01-04 LAB — VANCOMYCIN SERPL-MCNC: 25.2 UG/ML (ref 5–40)

## 2025-01-04 PROCEDURE — 36415 COLL VENOUS BLD VENIPUNCTURE: CPT

## 2025-01-04 PROCEDURE — 2500000003 HC RX 250 WO HCPCS

## 2025-01-04 PROCEDURE — 6370000000 HC RX 637 (ALT 250 FOR IP): Performed by: HOSPITALIST

## 2025-01-04 PROCEDURE — 6360000002 HC RX W HCPCS: Performed by: ORTHOPAEDIC SURGERY

## 2025-01-04 PROCEDURE — 94761 N-INVAS EAR/PLS OXIMETRY MLT: CPT

## 2025-01-04 PROCEDURE — 1100000000 HC RM PRIVATE

## 2025-01-04 PROCEDURE — 80202 ASSAY OF VANCOMYCIN: CPT

## 2025-01-04 PROCEDURE — 6370000000 HC RX 637 (ALT 250 FOR IP)

## 2025-01-04 PROCEDURE — 99232 SBSQ HOSP IP/OBS MODERATE 35: CPT | Performed by: INTERNAL MEDICINE

## 2025-01-04 PROCEDURE — 6360000002 HC RX W HCPCS

## 2025-01-04 PROCEDURE — 6360000002 HC RX W HCPCS: Performed by: HOSPITALIST

## 2025-01-04 PROCEDURE — 2580000003 HC RX 258

## 2025-01-04 RX ORDER — ACETAMINOPHEN 650 MG/1
650 SUPPOSITORY RECTAL EVERY 6 HOURS PRN
Status: DISCONTINUED | OUTPATIENT
Start: 2025-01-04 | End: 2025-01-08 | Stop reason: HOSPADM

## 2025-01-04 RX ORDER — VANCOMYCIN 1.75 G/350ML
1250 INJECTION, SOLUTION INTRAVENOUS EVERY 24 HOURS
Status: DISCONTINUED | OUTPATIENT
Start: 2025-01-04 | End: 2025-01-06

## 2025-01-04 RX ORDER — ACETAMINOPHEN 325 MG/1
650 TABLET ORAL EVERY 6 HOURS PRN
Status: DISCONTINUED | OUTPATIENT
Start: 2025-01-04 | End: 2025-01-08 | Stop reason: HOSPADM

## 2025-01-04 RX ADMIN — FAMOTIDINE 20 MG: 20 TABLET ORAL at 08:14

## 2025-01-04 RX ADMIN — SODIUM CHLORIDE, PRESERVATIVE FREE 10 ML: 5 INJECTION INTRAVENOUS at 08:22

## 2025-01-04 RX ADMIN — ATENOLOL 50 MG: 50 TABLET ORAL at 08:14

## 2025-01-04 RX ADMIN — FAMOTIDINE 20 MG: 20 TABLET ORAL at 21:30

## 2025-01-04 RX ADMIN — LEVOTHYROXINE SODIUM 50 MCG: 0.05 TABLET ORAL at 06:36

## 2025-01-04 RX ADMIN — TRAMADOL HYDROCHLORIDE 50 MG: 50 TABLET, COATED ORAL at 21:29

## 2025-01-04 RX ADMIN — SODIUM CHLORIDE, PRESERVATIVE FREE 10 ML: 5 INJECTION INTRAVENOUS at 22:00

## 2025-01-04 RX ADMIN — PIPERACILLIN AND TAZOBACTAM 3375 MG: 3; .375 INJECTION, POWDER, LYOPHILIZED, FOR SOLUTION INTRAVENOUS at 06:36

## 2025-01-04 RX ADMIN — THERA TABS 1 TABLET: TAB at 08:15

## 2025-01-04 RX ADMIN — THIAMINE HCL TAB 100 MG 100 MG: 100 TAB at 08:14

## 2025-01-04 RX ADMIN — TRAZODONE HYDROCHLORIDE 50 MG: 50 TABLET ORAL at 21:30

## 2025-01-04 RX ADMIN — SERTRALINE HYDROCHLORIDE 50 MG: 50 TABLET ORAL at 08:14

## 2025-01-04 RX ADMIN — ROSUVASTATIN CALCIUM 40 MG: 20 TABLET, FILM COATED ORAL at 18:48

## 2025-01-04 RX ADMIN — ENOXAPARIN SODIUM 40 MG: 100 INJECTION SUBCUTANEOUS at 08:15

## 2025-01-04 RX ADMIN — PIPERACILLIN AND TAZOBACTAM 3375 MG: 3; .375 INJECTION, POWDER, LYOPHILIZED, FOR SOLUTION INTRAVENOUS at 23:44

## 2025-01-04 RX ADMIN — VANCOMYCIN 1250 MG: 1.75 INJECTION, SOLUTION INTRAVENOUS at 21:59

## 2025-01-04 ASSESSMENT — PAIN DESCRIPTION - PAIN TYPE: TYPE: CHRONIC PAIN

## 2025-01-04 ASSESSMENT — PAIN DESCRIPTION - ONSET: ONSET: GRADUAL

## 2025-01-04 ASSESSMENT — PAIN SCALES - GENERAL
PAINLEVEL_OUTOF10: 6
PAINLEVEL_OUTOF10: 0

## 2025-01-04 ASSESSMENT — PAIN DESCRIPTION - DIRECTION: RADIATING_TOWARDS: FOOT

## 2025-01-04 ASSESSMENT — PAIN DESCRIPTION - LOCATION: LOCATION: LEG

## 2025-01-04 ASSESSMENT — PAIN - FUNCTIONAL ASSESSMENT: PAIN_FUNCTIONAL_ASSESSMENT: ACTIVITIES ARE NOT PREVENTED

## 2025-01-04 ASSESSMENT — PAIN DESCRIPTION - ORIENTATION: ORIENTATION: LEFT

## 2025-01-04 ASSESSMENT — PAIN DESCRIPTION - DESCRIPTORS: DESCRIPTORS: ACHING

## 2025-01-04 ASSESSMENT — PAIN DESCRIPTION - FREQUENCY: FREQUENCY: INTERMITTENT

## 2025-01-05 LAB
ANION GAP SERPL CALC-SCNC: 4 MMOL/L (ref 3–18)
BUN SERPL-MCNC: 19 MG/DL (ref 7–18)
BUN/CREAT SERPL: 28 (ref 12–20)
CALCIUM SERPL-MCNC: 8.6 MG/DL (ref 8.5–10.1)
CHLORIDE SERPL-SCNC: 108 MMOL/L (ref 100–111)
CO2 SERPL-SCNC: 33 MMOL/L (ref 21–32)
CREAT SERPL-MCNC: 0.69 MG/DL (ref 0.6–1.3)
GLUCOSE SERPL-MCNC: 99 MG/DL (ref 74–99)
POTASSIUM SERPL-SCNC: 3.7 MMOL/L (ref 3.5–5.5)
SODIUM SERPL-SCNC: 145 MMOL/L (ref 136–145)

## 2025-01-05 PROCEDURE — 6360000002 HC RX W HCPCS

## 2025-01-05 PROCEDURE — 6370000000 HC RX 637 (ALT 250 FOR IP): Performed by: HOSPITALIST

## 2025-01-05 PROCEDURE — 2500000003 HC RX 250 WO HCPCS

## 2025-01-05 PROCEDURE — 80048 BASIC METABOLIC PNL TOTAL CA: CPT

## 2025-01-05 PROCEDURE — 2580000003 HC RX 258

## 2025-01-05 PROCEDURE — 1100000000 HC RM PRIVATE

## 2025-01-05 PROCEDURE — 6370000000 HC RX 637 (ALT 250 FOR IP)

## 2025-01-05 PROCEDURE — 6360000002 HC RX W HCPCS: Performed by: ORTHOPAEDIC SURGERY

## 2025-01-05 PROCEDURE — 6360000002 HC RX W HCPCS: Performed by: HOSPITALIST

## 2025-01-05 PROCEDURE — 36415 COLL VENOUS BLD VENIPUNCTURE: CPT

## 2025-01-05 PROCEDURE — 99232 SBSQ HOSP IP/OBS MODERATE 35: CPT | Performed by: HOSPITALIST

## 2025-01-05 RX ADMIN — TRAMADOL HYDROCHLORIDE 50 MG: 50 TABLET, COATED ORAL at 23:29

## 2025-01-05 RX ADMIN — LEVOTHYROXINE SODIUM 50 MCG: 0.05 TABLET ORAL at 06:25

## 2025-01-05 RX ADMIN — SODIUM CHLORIDE, PRESERVATIVE FREE 10 ML: 5 INJECTION INTRAVENOUS at 20:47

## 2025-01-05 RX ADMIN — THIAMINE HCL TAB 100 MG 100 MG: 100 TAB at 08:52

## 2025-01-05 RX ADMIN — FAMOTIDINE 20 MG: 20 TABLET ORAL at 20:47

## 2025-01-05 RX ADMIN — TRAZODONE HYDROCHLORIDE 50 MG: 50 TABLET ORAL at 20:47

## 2025-01-05 RX ADMIN — FAMOTIDINE 20 MG: 20 TABLET ORAL at 08:57

## 2025-01-05 RX ADMIN — PIPERACILLIN AND TAZOBACTAM 3375 MG: 3; .375 INJECTION, POWDER, LYOPHILIZED, FOR SOLUTION INTRAVENOUS at 06:35

## 2025-01-05 RX ADMIN — THERA TABS 1 TABLET: TAB at 08:52

## 2025-01-05 RX ADMIN — ATENOLOL 50 MG: 50 TABLET ORAL at 08:52

## 2025-01-05 RX ADMIN — ENOXAPARIN SODIUM 40 MG: 100 INJECTION SUBCUTANEOUS at 08:52

## 2025-01-05 RX ADMIN — SERTRALINE HYDROCHLORIDE 50 MG: 50 TABLET ORAL at 08:51

## 2025-01-05 RX ADMIN — SODIUM CHLORIDE, PRESERVATIVE FREE 10 ML: 5 INJECTION INTRAVENOUS at 08:59

## 2025-01-05 RX ADMIN — PIPERACILLIN AND TAZOBACTAM 3375 MG: 3; .375 INJECTION, POWDER, LYOPHILIZED, FOR SOLUTION INTRAVENOUS at 14:42

## 2025-01-05 RX ADMIN — VANCOMYCIN 1250 MG: 1.75 INJECTION, SOLUTION INTRAVENOUS at 23:26

## 2025-01-05 RX ADMIN — ROSUVASTATIN CALCIUM 40 MG: 20 TABLET, FILM COATED ORAL at 17:29

## 2025-01-05 ASSESSMENT — PAIN SCALES - GENERAL
PAINLEVEL_OUTOF10: 0
PAINLEVEL_OUTOF10: 4
PAINLEVEL_OUTOF10: 0
PAINLEVEL_OUTOF10: 0

## 2025-01-05 ASSESSMENT — PAIN DESCRIPTION - PAIN TYPE: TYPE: CHRONIC PAIN

## 2025-01-05 ASSESSMENT — PAIN - FUNCTIONAL ASSESSMENT: PAIN_FUNCTIONAL_ASSESSMENT: ACTIVITIES ARE NOT PREVENTED

## 2025-01-05 ASSESSMENT — PAIN DESCRIPTION - LOCATION: LOCATION: LEG

## 2025-01-05 ASSESSMENT — PAIN DESCRIPTION - FREQUENCY: FREQUENCY: INTERMITTENT

## 2025-01-05 ASSESSMENT — PAIN DESCRIPTION - ONSET: ONSET: GRADUAL

## 2025-01-05 ASSESSMENT — PAIN DESCRIPTION - ORIENTATION: ORIENTATION: LEFT

## 2025-01-05 ASSESSMENT — PAIN DESCRIPTION - DESCRIPTORS: DESCRIPTORS: ACHING

## 2025-01-05 ASSESSMENT — PAIN DESCRIPTION - DIRECTION: RADIATING_TOWARDS: FOOT

## 2025-01-06 LAB — VANCOMYCIN SERPL-MCNC: 25.4 UG/ML (ref 5–40)

## 2025-01-06 PROCEDURE — 99232 SBSQ HOSP IP/OBS MODERATE 35: CPT | Performed by: HOSPITALIST

## 2025-01-06 PROCEDURE — 2500000003 HC RX 250 WO HCPCS

## 2025-01-06 PROCEDURE — 97530 THERAPEUTIC ACTIVITIES: CPT

## 2025-01-06 PROCEDURE — 80202 ASSAY OF VANCOMYCIN: CPT

## 2025-01-06 PROCEDURE — 6360000002 HC RX W HCPCS: Performed by: ORTHOPAEDIC SURGERY

## 2025-01-06 PROCEDURE — 1100000000 HC RM PRIVATE

## 2025-01-06 PROCEDURE — 6360000002 HC RX W HCPCS

## 2025-01-06 PROCEDURE — 97535 SELF CARE MNGMENT TRAINING: CPT

## 2025-01-06 PROCEDURE — 36415 COLL VENOUS BLD VENIPUNCTURE: CPT

## 2025-01-06 PROCEDURE — 6370000000 HC RX 637 (ALT 250 FOR IP)

## 2025-01-06 PROCEDURE — 6370000000 HC RX 637 (ALT 250 FOR IP): Performed by: HOSPITALIST

## 2025-01-06 PROCEDURE — 2580000003 HC RX 258

## 2025-01-06 RX ORDER — LACTOBACILLUS RHAMNOSUS GG 10B CELL
1 CAPSULE ORAL
Status: DISCONTINUED | OUTPATIENT
Start: 2025-01-07 | End: 2025-01-08 | Stop reason: HOSPADM

## 2025-01-06 RX ADMIN — SODIUM CHLORIDE, PRESERVATIVE FREE 10 ML: 5 INJECTION INTRAVENOUS at 09:43

## 2025-01-06 RX ADMIN — TRAZODONE HYDROCHLORIDE 50 MG: 50 TABLET ORAL at 20:31

## 2025-01-06 RX ADMIN — LEVOTHYROXINE SODIUM 50 MCG: 0.05 TABLET ORAL at 06:25

## 2025-01-06 RX ADMIN — SODIUM CHLORIDE, PRESERVATIVE FREE 10 ML: 5 INJECTION INTRAVENOUS at 20:31

## 2025-01-06 RX ADMIN — PIPERACILLIN AND TAZOBACTAM 3375 MG: 3; .375 INJECTION, POWDER, LYOPHILIZED, FOR SOLUTION INTRAVENOUS at 15:43

## 2025-01-06 RX ADMIN — PIPERACILLIN AND TAZOBACTAM 3375 MG: 3; .375 INJECTION, POWDER, LYOPHILIZED, FOR SOLUTION INTRAVENOUS at 22:38

## 2025-01-06 RX ADMIN — PIPERACILLIN AND TAZOBACTAM 3375 MG: 3; .375 INJECTION, POWDER, LYOPHILIZED, FOR SOLUTION INTRAVENOUS at 00:45

## 2025-01-06 RX ADMIN — FAMOTIDINE 20 MG: 20 TABLET ORAL at 09:42

## 2025-01-06 RX ADMIN — ENOXAPARIN SODIUM 40 MG: 100 INJECTION SUBCUTANEOUS at 09:41

## 2025-01-06 RX ADMIN — SERTRALINE HYDROCHLORIDE 50 MG: 50 TABLET ORAL at 09:42

## 2025-01-06 RX ADMIN — FAMOTIDINE 20 MG: 20 TABLET ORAL at 20:31

## 2025-01-06 RX ADMIN — THIAMINE HCL TAB 100 MG 100 MG: 100 TAB at 09:42

## 2025-01-06 RX ADMIN — PIPERACILLIN AND TAZOBACTAM 3375 MG: 3; .375 INJECTION, POWDER, LYOPHILIZED, FOR SOLUTION INTRAVENOUS at 06:24

## 2025-01-06 RX ADMIN — THERA TABS 1 TABLET: TAB at 09:42

## 2025-01-06 RX ADMIN — ROSUVASTATIN CALCIUM 40 MG: 20 TABLET, FILM COATED ORAL at 18:38

## 2025-01-06 ASSESSMENT — PAIN SCALES - GENERAL
PAINLEVEL_OUTOF10: 0

## 2025-01-06 NOTE — CARE COORDINATION
Manager of  asked for CM to ask Dr Marcelo to reach out to patient's wife today for determination if patient is going to have surgery or not for patient.         Livier  Specialist let CM know that auth for SNF to be cancelled today, due to unknown if patient is going to have surgery or not, information needed by insurance company for decision.   If no surgery, and documented, auth can be started, and if having surgery, will need documentation of surgery and therapy notes to restart auth.        Perfect Served Dr Marcelo and updated that Manager of  requesting him to call patient's wife and get a decision for surgery for patient today, and requesting an update today for patient.           Shawnee Mancera, RN  Case Management [phone]

## 2025-01-06 NOTE — NURSE NAVIGATOR
Followed up with patient reinforced low sodium diet, fluid restriction, heart failure zones, will continue to follow.

## 2025-01-06 NOTE — CARE COORDINATION
JARED called [facility] Ms. Lee [phone], SW received voicemail.    JARED left a message to return call [phone].    16:11  JARED Ross CM Specialist Livier to check patient Medicaid status.      Ashli Ames BSW  Case Management

## 2025-01-07 PROCEDURE — 2500000003 HC RX 250 WO HCPCS

## 2025-01-07 PROCEDURE — 6370000000 HC RX 637 (ALT 250 FOR IP)

## 2025-01-07 PROCEDURE — 6370000000 HC RX 637 (ALT 250 FOR IP): Performed by: INTERNAL MEDICINE

## 2025-01-07 PROCEDURE — 97530 THERAPEUTIC ACTIVITIES: CPT

## 2025-01-07 PROCEDURE — 6360000002 HC RX W HCPCS: Performed by: ORTHOPAEDIC SURGERY

## 2025-01-07 PROCEDURE — 99232 SBSQ HOSP IP/OBS MODERATE 35: CPT | Performed by: HOSPITALIST

## 2025-01-07 PROCEDURE — 2580000003 HC RX 258

## 2025-01-07 PROCEDURE — 97164 PT RE-EVAL EST PLAN CARE: CPT

## 2025-01-07 PROCEDURE — 6360000002 HC RX W HCPCS

## 2025-01-07 PROCEDURE — 6370000000 HC RX 637 (ALT 250 FOR IP): Performed by: HOSPITALIST

## 2025-01-07 PROCEDURE — 1100000000 HC RM PRIVATE

## 2025-01-07 PROCEDURE — 94761 N-INVAS EAR/PLS OXIMETRY MLT: CPT

## 2025-01-07 RX ADMIN — FAMOTIDINE 20 MG: 20 TABLET ORAL at 09:29

## 2025-01-07 RX ADMIN — TRAZODONE HYDROCHLORIDE 50 MG: 50 TABLET ORAL at 21:30

## 2025-01-07 RX ADMIN — FAMOTIDINE 20 MG: 20 TABLET ORAL at 21:30

## 2025-01-07 RX ADMIN — PIPERACILLIN AND TAZOBACTAM 3375 MG: 3; .375 INJECTION, POWDER, LYOPHILIZED, FOR SOLUTION INTRAVENOUS at 23:22

## 2025-01-07 RX ADMIN — Medication 1 CAPSULE: at 09:29

## 2025-01-07 RX ADMIN — TRAMADOL HYDROCHLORIDE 50 MG: 50 TABLET, COATED ORAL at 21:30

## 2025-01-07 RX ADMIN — THERA TABS 1 TABLET: TAB at 09:29

## 2025-01-07 RX ADMIN — SERTRALINE HYDROCHLORIDE 50 MG: 50 TABLET ORAL at 09:29

## 2025-01-07 RX ADMIN — ROSUVASTATIN CALCIUM 40 MG: 20 TABLET, FILM COATED ORAL at 16:57

## 2025-01-07 RX ADMIN — SODIUM CHLORIDE, PRESERVATIVE FREE 10 ML: 5 INJECTION INTRAVENOUS at 09:29

## 2025-01-07 RX ADMIN — PIPERACILLIN AND TAZOBACTAM 3375 MG: 3; .375 INJECTION, POWDER, LYOPHILIZED, FOR SOLUTION INTRAVENOUS at 13:25

## 2025-01-07 RX ADMIN — ATENOLOL 50 MG: 50 TABLET ORAL at 09:29

## 2025-01-07 RX ADMIN — ENOXAPARIN SODIUM 40 MG: 100 INJECTION SUBCUTANEOUS at 09:29

## 2025-01-07 RX ADMIN — LEVOTHYROXINE SODIUM 50 MCG: 0.05 TABLET ORAL at 05:43

## 2025-01-07 RX ADMIN — THIAMINE HCL TAB 100 MG 100 MG: 100 TAB at 09:29

## 2025-01-07 RX ADMIN — SODIUM CHLORIDE, PRESERVATIVE FREE 10 ML: 5 INJECTION INTRAVENOUS at 21:39

## 2025-01-07 RX ADMIN — PIPERACILLIN AND TAZOBACTAM 3375 MG: 3; .375 INJECTION, POWDER, LYOPHILIZED, FOR SOLUTION INTRAVENOUS at 06:16

## 2025-01-07 ASSESSMENT — PAIN DESCRIPTION - LOCATION
LOCATION: FOOT
LOCATION: FOOT

## 2025-01-07 ASSESSMENT — PAIN DESCRIPTION - FREQUENCY
FREQUENCY: INTERMITTENT
FREQUENCY: INTERMITTENT

## 2025-01-07 ASSESSMENT — PAIN SCALES - GENERAL
PAINLEVEL_OUTOF10: 0
PAINLEVEL_OUTOF10: 6
PAINLEVEL_OUTOF10: 0

## 2025-01-07 ASSESSMENT — PAIN DESCRIPTION - PAIN TYPE
TYPE: CHRONIC PAIN
TYPE: CHRONIC PAIN

## 2025-01-07 ASSESSMENT — PAIN DESCRIPTION - DIRECTION
RADIATING_TOWARDS: LEFT FOOT
RADIATING_TOWARDS: FOOT

## 2025-01-07 ASSESSMENT — PAIN DESCRIPTION - DESCRIPTORS
DESCRIPTORS: ACHING
DESCRIPTORS: ACHING

## 2025-01-07 ASSESSMENT — PAIN DESCRIPTION - ONSET
ONSET: GRADUAL
ONSET: GRADUAL

## 2025-01-07 ASSESSMENT — PAIN DESCRIPTION - ORIENTATION
ORIENTATION: LEFT
ORIENTATION: LEFT

## 2025-01-07 NOTE — CARE COORDINATION
JARED Valdez Served CM specialist and asked for assistance to restarted auth for [facility] and that note is in the chart.          JARED called Nisreen [facility], to informed that auth has been restarted.          Ashli FREITASW  Case Management

## 2025-01-07 NOTE — CARE COORDINATION
JARED Begum asking to check patient Medicaid status.      SW Perfect Served CM specialist Livier asking to check patient Medicaid status.        Ashli Ames BSW  Case Management

## 2025-01-07 NOTE — CARE COORDINATION
SNF Authorization Request  1/7/2025, 10:32 AM    Patient Name: Stefano Begum Jr.                   YOB: 1940    Patient has been provided with freedom of choice and has chosen to go to [facility]    SNF has confirmed that they can accept the patient and they have a bed Yes  SNF has confirmed that they are in network with the patient's insurance Yes    Please request authorization.    Estimated date of discharge is 01-    Skilled Need    PT Yes   OT Yes   Speech therapy No   Wound Care No  IV MedicationsNo  Trach No   Peg No     If PT/ OT/ Speech is required, evaluations/ notes have been updated within the last 48 hours Yes       Additional information N/A    Payor: Payor: HUMANA MEDICARE / Plan: HUMANA GOLD PLUS HMO / Product Type: *No Product type* /   Attending physician: Robb Marcelo MD Tishanda Lawrence  Case Management Department  Ph: [phone]

## 2025-01-07 NOTE — CARE COORDINATION
Pre-cert Request   1/7/2025, 12:20 PM    Patient Name: Stefano Begum Jr.                   YOB: 1940      *ALERT - Authorization is pending review by the insurance company.  This is not an authorization.*    Submitted via [employer].  Requested Facility:  [facility]  Anticipated Admit Date to [facility]:  1/7/2024  Pending Auth #:  [id number]  Pending Plan Auth ID:     Evette Lucas  Case Management Department  Ph: [phone] Fax: [phone]

## 2025-01-08 VITALS
HEART RATE: 65 BPM | BODY MASS INDEX: 20.39 KG/M2 | OXYGEN SATURATION: 96 % | HEIGHT: 70 IN | WEIGHT: 142.4 LBS | DIASTOLIC BLOOD PRESSURE: 74 MMHG | RESPIRATION RATE: 16 BRPM | TEMPERATURE: 98.2 F | SYSTOLIC BLOOD PRESSURE: 119 MMHG

## 2025-01-08 PROCEDURE — 6370000000 HC RX 637 (ALT 250 FOR IP): Performed by: HOSPITALIST

## 2025-01-08 PROCEDURE — 6360000002 HC RX W HCPCS

## 2025-01-08 PROCEDURE — 6370000000 HC RX 637 (ALT 250 FOR IP): Performed by: INTERNAL MEDICINE

## 2025-01-08 PROCEDURE — 2580000003 HC RX 258

## 2025-01-08 PROCEDURE — 97530 THERAPEUTIC ACTIVITIES: CPT

## 2025-01-08 PROCEDURE — 99239 HOSP IP/OBS DSCHRG MGMT >30: CPT | Performed by: HOSPITALIST

## 2025-01-08 PROCEDURE — 97168 OT RE-EVAL EST PLAN CARE: CPT

## 2025-01-08 PROCEDURE — 6360000002 HC RX W HCPCS: Performed by: ORTHOPAEDIC SURGERY

## 2025-01-08 PROCEDURE — 2500000003 HC RX 250 WO HCPCS

## 2025-01-08 PROCEDURE — 6370000000 HC RX 637 (ALT 250 FOR IP)

## 2025-01-08 RX ORDER — LACTOBACILLUS RHAMNOSUS GG 10B CELL
1 CAPSULE ORAL
Qty: 60 CAPSULE | Refills: 0 | Status: SHIPPED | OUTPATIENT
Start: 2025-01-09

## 2025-01-08 RX ORDER — MULTIVITAMIN WITH IRON
1 TABLET ORAL DAILY
Qty: 30 TABLET | Refills: 0 | Status: SHIPPED | OUTPATIENT
Start: 2025-01-09

## 2025-01-08 RX ORDER — CIPROFLOXACIN 500 MG/1
500 TABLET, FILM COATED ORAL 2 TIMES DAILY
Qty: 32 TABLET | Refills: 0 | Status: SHIPPED
Start: 2025-01-08 | End: 2025-01-08

## 2025-01-08 RX ORDER — CIPROFLOXACIN 500 MG/1
500 TABLET, FILM COATED ORAL 2 TIMES DAILY
Qty: 32 TABLET | Refills: 0 | Status: SHIPPED | OUTPATIENT
Start: 2025-01-08 | End: 2025-01-24

## 2025-01-08 RX ORDER — THIAMINE MONONITRATE (VIT B1) 100 MG
100 TABLET ORAL DAILY
Qty: 30 TABLET | Refills: 0 | Status: SHIPPED | OUTPATIENT
Start: 2025-01-09

## 2025-01-08 RX ADMIN — SODIUM CHLORIDE, PRESERVATIVE FREE 10 ML: 5 INJECTION INTRAVENOUS at 08:16

## 2025-01-08 RX ADMIN — THERA TABS 1 TABLET: TAB at 08:15

## 2025-01-08 RX ADMIN — Medication 1 CAPSULE: at 08:15

## 2025-01-08 RX ADMIN — PIPERACILLIN AND TAZOBACTAM 3375 MG: 3; .375 INJECTION, POWDER, LYOPHILIZED, FOR SOLUTION INTRAVENOUS at 06:27

## 2025-01-08 RX ADMIN — THIAMINE HCL TAB 100 MG 100 MG: 100 TAB at 08:15

## 2025-01-08 RX ADMIN — SERTRALINE HYDROCHLORIDE 50 MG: 50 TABLET ORAL at 08:15

## 2025-01-08 RX ADMIN — ATENOLOL 50 MG: 50 TABLET ORAL at 08:15

## 2025-01-08 RX ADMIN — LEVOTHYROXINE SODIUM 50 MCG: 0.05 TABLET ORAL at 06:27

## 2025-01-08 RX ADMIN — FAMOTIDINE 20 MG: 20 TABLET ORAL at 08:15

## 2025-01-08 RX ADMIN — ENOXAPARIN SODIUM 40 MG: 100 INJECTION SUBCUTANEOUS at 08:15

## 2025-01-08 ASSESSMENT — PAIN SCALES - PAIN ASSESSMENT IN ADVANCED DEMENTIA (PAINAD)
BODYLANGUAGE: RELAXED
BREATHING: NORMAL
FACIALEXPRESSION: SMILING OR INEXPRESSIVE
BODYLANGUAGE: RELAXED
CONSOLABILITY: NO NEED TO CONSOLE
TOTALSCORE: 0
TOTALSCORE: 0
BREATHING: NORMAL
CONSOLABILITY: NO NEED TO CONSOLE
FACIALEXPRESSION: SMILING OR INEXPRESSIVE

## 2025-01-08 ASSESSMENT — PAIN SCALES - GENERAL
PAINLEVEL_OUTOF10: 0
PAINLEVEL_OUTOF10: 0

## 2025-01-08 NOTE — CARE COORDINATION
[facility] Authorization  1/8/2025, 12:19 PM    Patient Name: Stefano Begum Jr.                   YOB: 1940      Received via: [employer]  Auth ID:  [id number]  Plan Auth ID: [id number]  Service:  [facility] formerly know as   Approval Dates: 1/7/2025-1/9/2025  Next Review Date: 1/9/2025        Evette Lucas  Case Management Department  Ph: [phone]: [phone]

## 2025-01-08 NOTE — PLAN OF CARE
Problem: Chronic Conditions and Co-morbidities  Goal: Patient's chronic conditions and co-morbidity symptoms are monitored and maintained or improved  1/3/2025 2228 by Lilo Dash RN  Outcome: Progressing  Flowsheets (Taken 1/2/2025 2014 by Darlin Carr, RN)  Care Plan - Patient's Chronic Conditions and Co-Morbidity Symptoms are Monitored and Maintained or Improved:   Monitor and assess patient's chronic conditions and comorbid symptoms for stability, deterioration, or improvement   Collaborate with multidisciplinary team to address chronic and comorbid conditions and prevent exacerbation or deterioration   Update acute care plan with appropriate goals if chronic or comorbid symptoms are exacerbated and prevent overall improvement and discharge  1/3/2025 1449 by Marilynn Singh RN  Outcome: Progressing  Flowsheets (Taken 1/2/2025 2014 by Darlin Carr, RN)  Care Plan - Patient's Chronic Conditions and Co-Morbidity Symptoms are Monitored and Maintained or Improved:   Monitor and assess patient's chronic conditions and comorbid symptoms for stability, deterioration, or improvement   Collaborate with multidisciplinary team to address chronic and comorbid conditions and prevent exacerbation or deterioration   Update acute care plan with appropriate goals if chronic or comorbid symptoms are exacerbated and prevent overall improvement and discharge     Problem: Discharge Planning  Goal: Discharge to home or other facility with appropriate resources  1/3/2025 2228 by Lilo Dash RN  Outcome: Progressing  Flowsheets (Taken 1/2/2025 2014 by Darlin Carr, RN)  Discharge to home or other facility with appropriate resources: Identify barriers to discharge with patient and caregiver  1/3/2025 1449 by Marilynn Singh RN  Outcome: Progressing  Flowsheets (Taken 1/2/2025 2014 by Darlin Carr, RN)  Discharge to home or other facility with appropriate resources: Identify barriers to discharge with patient and 
  Problem: Chronic Conditions and Co-morbidities  Goal: Patient's chronic conditions and co-morbidity symptoms are monitored and maintained or improved  1/5/2025 1239 by Marilynn Singh RN  Outcome: Progressing  Flowsheets (Taken 1/4/2025 2000 by Renetta Mendez, RN)  Care Plan - Patient's Chronic Conditions and Co-Morbidity Symptoms are Monitored and Maintained or Improved: Monitor and assess patient's chronic conditions and comorbid symptoms for stability, deterioration, or improvement     Problem: Discharge Planning  Goal: Discharge to home or other facility with appropriate resources  1/6/2025 0233 by Adeline Desouza RN  Outcome: Progressing  Flowsheets  Taken 1/6/2025 0233  Discharge to home or other facility with appropriate resources:   Identify barriers to discharge with patient and caregiver   Arrange for needed discharge resources and transportation as appropriate   Identify discharge learning needs (meds, wound care, etc)   Arrange for interpreters to assist at discharge as needed  Taken 1/5/2025 2006  Discharge to home or other facility with appropriate resources: Identify barriers to discharge with patient and caregiver  Problem: Discharge Planning  Goal: Discharge to home or other facility with appropriate resources  1/6/2025 0233 by Adeline Desouza RN  Outcome: Progressing  Flowsheets  Taken 1/6/2025 0233  Discharge to home or other facility with appropriate resources:   Identify barriers to discharge with patient and caregiver   Arrange for needed discharge resources and transportation as appropriate   Identify discharge learning needs (meds, wound care, etc)   Arrange for interpreters to assist at discharge as needed  Taken 1/5/2025 2006  Discharge to home or other facility with appropriate resources: Identify barriers to discharge with patient and caregiver  Note: Patient will be given medicatin for   1/5/2025 1239 by Marilynn Singh, RN  Outcome: Progressing  Flowsheets (Taken 1/4/2025 2000 by 
  Problem: Chronic Conditions and Co-morbidities  Goal: Patient's chronic conditions and co-morbidity symptoms are monitored and maintained or improved  12/24/2024 2321 by Adeline Desouza RN  Outcome: Progressing     Problem: Discharge Planning  Goal: Discharge to home or other facility with appropriate resources  12/24/2024 2321 by Adeline Desouza RN  Outcome: Progressing  Flowsheets (Taken 12/24/2024 2321)  Discharge to home or other facility with appropriate resources:   Identify barriers to discharge with patient and caregiver   Arrange for needed discharge resources and transportation as appropriate   Identify discharge learning needs (meds, wound care, etc)  Note: Patient will identify barriers to discharge, patient will be discharged when he is stable.     Problem: Skin/Tissue Integrity  Goal: Absence of new skin breakdown  Description: 1.  Monitor for areas of redness and/or skin breakdown  2.  Assess vascular access sites hourly  3.  Every 4-6 hours minimum:  Change oxygen saturation probe site  4.  Every 4-6 hours:  If on nasal continuous positive airway pressure, respiratory therapy assess nares and determine need for appliance change or resting period.  12/24/2024 2321 by Adeline Desouza RN  Outcome: Progressing  Note: Patient will be turned every 2hours, skin will be assessed PRN     Problem: ABCDS Injury Assessment  Goal: Absence of physical injury  12/24/2024 2321 by Adeline Desouza RN  Outcome: Progressing     Problem: Safety - Adult  Goal: Free from fall injury  12/24/2024 2321 by Adeline Desouza RN  Outcome: Progressing  Flowsheets (Taken 12/24/2024 2321)  Free From Fall Injury:   Instruct family/caregiver on patient safety   Based on caregiver fall risk screen, instruct family/caregiver to ask for assistance with transferring infant if caregiver noted to have fall risk factors  Note: Call bell and bed side table within reach     Problem: Pain  Goal: Verbalizes/displays adequate 
  Problem: Chronic Conditions and Co-morbidities  Goal: Patient's chronic conditions and co-morbidity symptoms are monitored and maintained or improved  12/25/2024 1511 by Jessica Irving RN  Outcome: Progressing     Problem: Discharge Planning  Goal: Discharge to home or other facility with appropriate resources  12/25/2024 1511 by Jessica Irving RN  Outcome: Progressing     Problem: Skin/Tissue Integrity  Goal: Absence of new skin breakdown  Description: 1.  Monitor for areas of redness and/or skin breakdown  2.  Assess vascular access sites hourly  3.  Every 4-6 hours minimum:  Change oxygen saturation probe site  4.  Every 4-6 hours:  If on nasal continuous positive airway pressure, respiratory therapy assess nares and determine need for appliance change or resting period.  12/25/2024 1511 by Jessica Irving RN  Outcome: Progressing     Problem: ABCDS Injury Assessment  Goal: Absence of physical injury  12/25/2024 2041 by Beverly Boo RN  Outcome: Progressing  12/25/2024 1511 by Jessica Irving RN  Outcome: Progressing     Problem: Safety - Adult  Goal: Free from fall injury  12/25/2024 2041 by Beverly Boo RN  Outcome: Progressing  12/25/2024 1511 by Jessica Irving RN  Outcome: Progressing     Problem: Pain  Goal: Verbalizes/displays adequate comfort level or baseline comfort level  12/25/2024 2041 by Beverly Boo RN  Outcome: Progressing  12/25/2024 1511 by Jessica Irving RN  Outcome: Progressing     Problem: Nutrition Deficit:  Goal: Optimize nutritional status  12/25/2024 1511 by Jessica Irving RN  Outcome: Progressing     
  Problem: Chronic Conditions and Co-morbidities  Goal: Patient's chronic conditions and co-morbidity symptoms are monitored and maintained or improved  12/29/2024 0222 by Darlin Carr RN  Outcome: Progressing  12/29/2024 0221 by Darlin Carr RN  Outcome: Progressing  Flowsheets (Taken 12/28/2024 2100)  Care Plan - Patient's Chronic Conditions and Co-Morbidity Symptoms are Monitored and Maintained or Improved:   Monitor and assess patient's chronic conditions and comorbid symptoms for stability, deterioration, or improvement   Collaborate with multidisciplinary team to address chronic and comorbid conditions and prevent exacerbation or deterioration   Update acute care plan with appropriate goals if chronic or comorbid symptoms are exacerbated and prevent overall improvement and discharge  12/28/2024 1543 by Meg Irving RN  Outcome: Progressing  Flowsheets (Taken 12/28/2024 1543)  Care Plan - Patient's Chronic Conditions and Co-Morbidity Symptoms are Monitored and Maintained or Improved:   Monitor and assess patient's chronic conditions and comorbid symptoms for stability, deterioration, or improvement   Collaborate with multidisciplinary team to address chronic and comorbid conditions and prevent exacerbation or deterioration   Update acute care plan with appropriate goals if chronic or comorbid symptoms are exacerbated and prevent overall improvement and discharge  Note: Monitor and assess patient's chronic conditions and comorbid symptoms for stability, deterioration, or improvement; Collaborate with multidisciplinary team to address chronic and comorbid conditions and prevent exacerbation or deterioration      Problem: Discharge Planning  Goal: Discharge to home or other facility with appropriate resources  12/29/2024 0222 by Darlin Carr RN  Outcome: Progressing  12/29/2024 0221 by Darlin Carr RN  Outcome: Progressing  Flowsheets (Taken 12/28/2024 2100)  Discharge to home or other facility with 
  Problem: Chronic Conditions and Co-morbidities  Goal: Patient's chronic conditions and co-morbidity symptoms are monitored and maintained or improved  12/30/2024 1920 by Meg Irving RN  Outcome: Progressing  Flowsheets (Taken 12/29/2024 2045 by Darlin Carr, RN)  Care Plan - Patient's Chronic Conditions and Co-Morbidity Symptoms are Monitored and Maintained or Improved:   Monitor and assess patient's chronic conditions and comorbid symptoms for stability, deterioration, or improvement   Collaborate with multidisciplinary team to address chronic and comorbid conditions and prevent exacerbation or deterioration   Update acute care plan with appropriate goals if chronic or comorbid symptoms are exacerbated and prevent overall improvement and discharge  12/30/2024 1607 by Meg Irving RN  Outcome: Progressing     Problem: Discharge Planning  Goal: Discharge to home or other facility with appropriate resources  12/30/2024 1920 by Meg Irving RN  Outcome: Progressing  12/30/2024 1607 by Meg Irving RN  Outcome: Progressing  Flowsheets (Taken 12/30/2024 1607)  Discharge to home or other facility with appropriate resources:   Identify discharge learning needs (meds, wound care, etc)   Identify barriers to discharge with patient and caregiver   Arrange for needed discharge resources and transportation as appropriate  Note: Discharge planning will begin or continue to meet patient goals. Patient will be discharged from [facility] to home or residence of choice in stable condition.      Problem: Skin/Tissue Integrity  Goal: Absence of new skin breakdown  Description: 1.  Monitor for areas of redness and/or skin breakdown  2.  Assess vascular access sites hourly  3.  Every 4-6 hours minimum:  Change oxygen saturation probe site  4.  Every 4-6 hours:  If on nasal continuous positive airway pressure, respiratory therapy assess nares and determine need for appliance change or resting period.  12/30/2024 1920 by Aristides 
  Problem: Chronic Conditions and Co-morbidities  Goal: Patient's chronic conditions and co-morbidity symptoms are monitored and maintained or improved  Outcome: Progressing     Problem: Discharge Planning  Goal: Discharge to home or other facility with appropriate resources  Outcome: Progressing  Flowsheets (Taken 12/30/2024 1607)  Discharge to home or other facility with appropriate resources:   Identify discharge learning needs (meds, wound care, etc)   Identify barriers to discharge with patient and caregiver   Arrange for needed discharge resources and transportation as appropriate  Note: Discharge planning will begin or continue to meet patient goals. Patient will be discharged from [facility] to home or residence of choice in stable condition.      Problem: Skin/Tissue Integrity  Goal: Absence of new skin breakdown  Description: 1.  Monitor for areas of redness and/or skin breakdown  2.  Assess vascular access sites hourly  3.  Every 4-6 hours minimum:  Change oxygen saturation probe site  4.  Every 4-6 hours:  If on nasal continuous positive airway pressure, respiratory therapy assess nares and determine need for appliance change or resting period.  Outcome: Progressing  Note: Patient will be free of any skin breakdown      Problem: ABCDS Injury Assessment  Goal: Absence of physical injury  Outcome: Progressing  Flowsheets (Taken 12/29/2024 1450)  Absence of Physical Injury: Implement safety measures based on patient assessment  Note: Bed in lowest position and call bell within reach      Problem: Safety - Adult  Goal: Free from fall injury  Outcome: Progressing  Flowsheets (Taken 12/30/2024 1607)  Free From Fall Injury:   Instruct family/caregiver on patient safety   Based on caregiver fall risk screen, instruct family/caregiver to ask for assistance with transferring infant if caregiver noted to have fall risk factors  Note: Patient is admitted for Osteomyelitis left lower leg . They will remain free from falls 
  Problem: Chronic Conditions and Co-morbidities  Goal: Patient's chronic conditions and co-morbidity symptoms are monitored and maintained or improved  Outcome: Progressing     Problem: Skin/Tissue Integrity  Goal: Absence of new skin breakdown  Description: 1.  Monitor for areas of redness and/or skin breakdown  2.  Assess vascular access sites hourly  3.  Every 4-6 hours minimum:  Change oxygen saturation probe site  4.  Every 4-6 hours:  If on nasal continuous positive airway pressure, respiratory therapy assess nares and determine need for appliance change or resting period.  Outcome: Progressing    Patient assisted/encouraged to turn every 2 hours to prevent skin breakdown. Patient will allow the use of pillow supports and/or needed items to alleviate pressure. Skin will be monitored throughout shift for changes.     Problem: Safety - Adult  Goal: Free from fall injury  Outcome: Progressing   Patient encouraged to call for assistance as needed. Call bell within reach. Bed in lowest locked position. Bed alarm active and audible. Patient verbalizes understanding and demonstrates use of call bell.  
  Problem: Chronic Conditions and Co-morbidities  Goal: Patient's chronic conditions and co-morbidity symptoms are monitored and maintained or improved  Outcome: Progressing  Flowsheets (Taken 1/2/2025 0800)  Care Plan - Patient's Chronic Conditions and Co-Morbidity Symptoms are Monitored and Maintained or Improved:   Monitor and assess patient's chronic conditions and comorbid symptoms for stability, deterioration, or improvement   Collaborate with multidisciplinary team to address chronic and comorbid conditions and prevent exacerbation or deterioration   Update acute care plan with appropriate goals if chronic or comorbid symptoms are exacerbated and prevent overall improvement and discharge     Problem: Discharge Planning  Goal: Discharge to home or other facility with appropriate resources  Outcome: Progressing  Flowsheets (Taken 1/2/2025 0800)  Discharge to home or other facility with appropriate resources:   Identify barriers to discharge with patient and caregiver   Arrange for needed discharge resources and transportation as appropriate   Arrange for interpreters to assist at discharge as needed   Identify discharge learning needs (meds, wound care, etc)   Refer to discharge planning if patient needs post-hospital services based on physician order or complex needs related to functional status, cognitive ability or social support system     Problem: Skin/Tissue Integrity  Goal: Absence of new skin breakdown  Description: 1.  Monitor for areas of redness and/or skin breakdown  2.  Assess vascular access sites hourly  3.  Every 4-6 hours minimum:  Change oxygen saturation probe site  4.  Every 4-6 hours:  If on nasal continuous positive airway pressure, respiratory therapy assess nares and determine need for appliance change or resting period.  Outcome: Progressing  Note: Will continue to assess for further skin breakdown and encourage patient to turn every two hours.      Problem: Safety - Adult  Goal: Free 
  Problem: Chronic Conditions and Co-morbidities  Goal: Patient's chronic conditions and co-morbidity symptoms are monitored and maintained or improved  Outcome: Progressing  Flowsheets (Taken 1/2/2025 2014 by Darlin Carr, RN)  Care Plan - Patient's Chronic Conditions and Co-Morbidity Symptoms are Monitored and Maintained or Improved:   Monitor and assess patient's chronic conditions and comorbid symptoms for stability, deterioration, or improvement   Collaborate with multidisciplinary team to address chronic and comorbid conditions and prevent exacerbation or deterioration   Update acute care plan with appropriate goals if chronic or comorbid symptoms are exacerbated and prevent overall improvement and discharge     Problem: Discharge Planning  Goal: Discharge to home or other facility with appropriate resources  Outcome: Progressing  Flowsheets (Taken 1/2/2025 2014 by Darlin Carr, RN)  Discharge to home or other facility with appropriate resources: Identify barriers to discharge with patient and caregiver     Problem: Skin/Tissue Integrity  Goal: Absence of new skin breakdown  Description: 1.  Monitor for areas of redness and/or skin breakdown  2.  Assess vascular access sites hourly  3.  Every 4-6 hours minimum:  Change oxygen saturation probe site  4.  Every 4-6 hours:  If on nasal continuous positive airway pressure, respiratory therapy assess nares and determine need for appliance change or resting period.  Outcome: Progressing  Note: Patient turned every 2hrs      Problem: ABCDS Injury Assessment  Goal: Absence of physical injury  Outcome: Progressing  Flowsheets (Taken 12/30/2024 1920 by Meg Irving, RN)  Absence of Physical Injury: Implement safety measures based on patient assessment  Note: Bed alarm on, call bell in reach      Problem: Safety - Adult  Goal: Free from fall injury  Outcome: Progressing  Flowsheets (Taken 1/2/2025 1746 by Annita Cerrato, RN)  Free From Fall Injury: Instruct 
  Problem: Chronic Conditions and Co-morbidities  Goal: Patient's chronic conditions and co-morbidity symptoms are monitored and maintained or improved  Outcome: Progressing  Flowsheets (Taken 1/4/2025 2000 by Renetta Mendez, RN)  Care Plan - Patient's Chronic Conditions and Co-Morbidity Symptoms are Monitored and Maintained or Improved: Monitor and assess patient's chronic conditions and comorbid symptoms for stability, deterioration, or improvement     Problem: Discharge Planning  Goal: Discharge to home or other facility with appropriate resources  Outcome: Progressing  Flowsheets (Taken 1/4/2025 2000 by Renetta Mendez, RN)  Discharge to home or other facility with appropriate resources: Identify barriers to discharge with patient and caregiver     Problem: Skin/Tissue Integrity  Goal: Absence of new skin breakdown  Description: 1.  Monitor for areas of redness and/or skin breakdown  2.  Assess vascular access sites hourly  3.  Every 4-6 hours minimum:  Change oxygen saturation probe site  4.  Every 4-6 hours:  If on nasal continuous positive airway pressure, respiratory therapy assess nares and determine need for appliance change or resting period.  Outcome: Progressing  Note: Skin intact      Problem: ABCDS Injury Assessment  Goal: Absence of physical injury  Outcome: Progressing  Flowsheets (Taken 12/30/2024 1920 by Meg Irving, RN)  Absence of Physical Injury: Implement safety measures based on patient assessment  Note: Bed alarm on, door open, socks in place, call bell in reach       Problem: Safety - Adult  Goal: Free from fall injury  1/5/2025 1239 by Marilynn Singh, RN  Outcome: Progressing  Flowsheets (Taken 1/5/2025 0214 by Renetta Mendez, RN)  Free From Fall Injury: Based on caregiver fall risk screen, instruct family/caregiver to ask for assistance with transferring infant if caregiver noted to have fall risk factors  Note: Bed alarm on, door open, socks in place, call bell in reach  1/5/2025 0214 by Vanessa 
  Problem: Chronic Conditions and Co-morbidities  Goal: Patient's chronic conditions and co-morbidity symptoms are monitored and maintained or improved  Outcome: Progressing  Flowsheets (Taken 1/5/2025 2006 by Adeline Desouza, RN)  Care Plan - Patient's Chronic Conditions and Co-Morbidity Symptoms are Monitored and Maintained or Improved: Monitor and assess patient's chronic conditions and comorbid symptoms for stability, deterioration, or improvement  Note: Pt has a hx of Afib... pt on tele box 221 for continuous monitoring      Problem: Discharge Planning  Goal: Discharge to home or other facility with appropriate resources  1/6/2025 1117 by Janis Irene, RN  Outcome: Progressing  Flowsheets (Taken 1/6/2025 0233 by Adeline Desouza, RN)  Discharge to home or other facility with appropriate resources:   Identify barriers to discharge with patient and caregiver   Arrange for needed discharge resources and transportation as appropriate   Identify discharge learning needs (meds, wound care, etc)   Arrange for interpreters to assist at discharge as needed  Note: Pt has an infected L foot that needs surgical intervention, however that has been some barriers d/t his orientation... family plans to get involved to move forward  1/6/2025 0233 by Adeline Desouza, RN  Outcome: Progressing  Flowsheets  Taken 1/6/2025 0233  Discharge to home or other facility with appropriate resources:   Identify barriers to discharge with patient and caregiver   Arrange for needed discharge resources and transportation as appropriate   Identify discharge learning needs (meds, wound care, etc)   Arrange for interpreters to assist at discharge as needed  Taken 1/5/2025 2006  Discharge to home or other facility with appropriate resources: Identify barriers to discharge with patient and caregiver  Note: Patient will be given medicatin for      Problem: Skin/Tissue Integrity  Goal: Absence of new skin breakdown  Description: 1.  
  Problem: Chronic Conditions and Co-morbidities  Goal: Patient's chronic conditions and co-morbidity symptoms are monitored and maintained or improved  Outcome: Progressing  Flowsheets (Taken 12/24/2024 0733)  Care Plan - Patient's Chronic Conditions and Co-Morbidity Symptoms are Monitored and Maintained or Improved: Monitor and assess patient's chronic conditions and comorbid symptoms for stability, deterioration, or improvement     Problem: Discharge Planning  Goal: Discharge to home or other facility with appropriate resources  Outcome: Progressing  Flowsheets (Taken 12/24/2024 0106 by Adeline Desouza RN)  Discharge to home or other facility with appropriate resources: Identify barriers to discharge with patient and caregiver     Problem: Skin/Tissue Integrity  Goal: Absence of new skin breakdown  Description: 1.  Monitor for areas of redness and/or skin breakdown  2.  Assess vascular access sites hourly  3.  Every 4-6 hours minimum:  Change oxygen saturation probe site  4.  Every 4-6 hours:  If on nasal continuous positive airway pressure, respiratory therapy assess nares and determine need for appliance change or resting period.  Outcome: Progressing  Note: Patient will continue to be monitored for s/s of skin breakdown.     Problem: ABCDS Injury Assessment  Goal: Absence of physical injury  Outcome: Progressing  Flowsheets (Taken 12/24/2024 0733)  Absence of Physical Injury: Implement safety measures based on patient assessment     Problem: Safety - Adult  Goal: Free from fall injury  Outcome: Progressing  Flowsheets (Taken 12/24/2024 0733)  Free From Fall Injury: Instruct family/caregiver on patient safety  Note: Patient is admitted for Osteomyelitis of left lower extremity . They will remain free from falls or injuries. Instruct family/caregiver on patient safety.      Problem: Pain  Goal: Verbalizes/displays adequate comfort level or baseline comfort level  Outcome: Progressing  Flowsheets (Taken 
  Problem: Chronic Conditions and Co-morbidities  Goal: Patient's chronic conditions and co-morbidity symptoms are monitored and maintained or improved  Outcome: Progressing  Flowsheets (Taken 12/24/2024 2301 by Adeline Desouza, RN)  Care Plan - Patient's Chronic Conditions and Co-Morbidity Symptoms are Monitored and Maintained or Improved:   Monitor and assess patient's chronic conditions and comorbid symptoms for stability, deterioration, or improvement   Collaborate with multidisciplinary team to address chronic and comorbid conditions and prevent exacerbation or deterioration     Problem: Discharge Planning  Goal: Discharge to home or other facility with appropriate resources  Outcome: Progressing  Flowsheets (Taken 12/24/2024 2321 by Adeline Desouza, RN)  Discharge to home or other facility with appropriate resources:   Identify barriers to discharge with patient and caregiver   Arrange for needed discharge resources and transportation as appropriate   Identify discharge learning needs (meds, wound care, etc)  Note: Will discharge once stable     Problem: Skin/Tissue Integrity  Goal: Absence of new skin breakdown  Description: 1.  Monitor for areas of redness and/or skin breakdown  2.  Assess vascular access sites hourly  3.  Every 4-6 hours minimum:  Change oxygen saturation probe site  4.  Every 4-6 hours:  If on nasal continuous positive airway pressure, respiratory therapy assess nares and determine need for appliance change or resting period.  Outcome: Progressing  Note: Remind patient to turn frequently     Problem: ABCDS Injury Assessment  Goal: Absence of physical injury  12/26/2024 0935 by Concepcion Howell, RN  Outcome: Progressing  Flowsheets (Taken 12/24/2024 0733 by Paula Serna GN)  Absence of Physical Injury: Implement safety measures based on patient assessment  Note:  socks on, bed alarm, on, bed rails up, bed in low position, and call bell within reach  12/25/2024 2041 by Rajeev 
  Problem: Chronic Conditions and Co-morbidities  Goal: Patient's chronic conditions and co-morbidity symptoms are monitored and maintained or improved  Outcome: Progressing  Flowsheets (Taken 12/27/2024 0720)  Care Plan - Patient's Chronic Conditions and Co-Morbidity Symptoms are Monitored and Maintained or Improved:   Monitor and assess patient's chronic conditions and comorbid symptoms for stability, deterioration, or improvement   Update acute care plan with appropriate goals if chronic or comorbid symptoms are exacerbated and prevent overall improvement and discharge   Collaborate with multidisciplinary team to address chronic and comorbid conditions and prevent exacerbation or deterioration     Problem: Discharge Planning  Goal: Discharge to home or other facility with appropriate resources  Outcome: Progressing  Flowsheets (Taken 12/27/2024 0720)  Discharge to home or other facility with appropriate resources:   Identify barriers to discharge with patient and caregiver   Arrange for needed discharge resources and transportation as appropriate   Identify discharge learning needs (meds, wound care, etc)   Refer to discharge planning if patient needs post-hospital services based on physician order or complex needs related to functional status, cognitive ability or social support system   Arrange for interpreters to assist at discharge as needed     Problem: Skin/Tissue Integrity  Goal: Absence of new skin breakdown  Description: 1.  Monitor for areas of redness and/or skin breakdown  2.  Assess vascular access sites hourly  3.  Every 4-6 hours minimum:  Change oxygen saturation probe site  4.  Every 4-6 hours:  If on nasal continuous positive airway pressure, respiratory therapy assess nares and determine need for appliance change or resting period.  Outcome: Progressing  Note: Patient will continue to be monitored for s/s of skin breakdown.     Problem: ABCDS Injury Assessment  Goal: Absence of physical 
  Problem: Chronic Conditions and Co-morbidities  Goal: Patient's chronic conditions and co-morbidity symptoms are monitored and maintained or improved  Recent Flowsheet Documentation  Taken 1/7/2025 1933 by Adeline Desouza RN  Care Plan - Patient's Chronic Conditions and Co-Morbidity Symptoms are Monitored and Maintained or Improved: Monitor and assess patient's chronic conditions and comorbid symptoms for stability, deterioration, or improvement     Problem: Discharge Planning  Goal: Discharge to home or other facility with appropriate resources  Outcome: Progressing  Flowsheets  Taken 1/7/2025 1933  Discharge to home or other facility with appropriate resources: Identify barriers to discharge with patient and caregiver  Taken 1/7/2025 0052  Discharge to home or other facility with appropriate resources:   Identify barriers to discharge with patient and caregiver   Arrange for needed discharge resources and transportation as appropriate   Identify discharge learning needs (meds, wound care, etc)  Note: Patient will be discharged when he is medically stable     Problem: Discharge Planning  Goal: Discharge to home or other facility with appropriate resources  Outcome: Progressing  Flowsheets  Taken 1/7/2025 1933  Discharge to home or other facility with appropriate resources: Identify barriers to discharge with patient and caregiver  Taken 1/7/2025 0052  Discharge to home or other facility with appropriate resources:   Identify barriers to discharge with patient and caregiver   Arrange for needed discharge resources and transportation as appropriate   Identify discharge learning needs (meds, wound care, etc)  Note: Patient will be discharged when he is medically stable     Problem: Skin/Tissue Integrity  Goal: Absence of new skin breakdown  Description: 1.  Monitor for areas of redness and/or skin breakdown  2.  Assess vascular access sites hourly  3.  Every 4-6 hours minimum:  Change oxygen saturation probe 
  Problem: Neurosensory - Adult  Goal: Achieves maximal functionality and self care  Outcome: Not Progressing  Flowsheets  Taken 1/7/2025 1942 by Dioemdes Carlin RN  Achieves maximal functionality and self care: Encourage and assist patient to increase activity and self care with guidance from physical therapy/occupational therapy  Taken 1/7/2025 1933 by Adeline Desouza RN  Achieves maximal functionality and self care: Encourage and assist patient to increase activity and self care with guidance from physical therapy/occupational therapy  Taken 1/7/2025 0930 by Diomedes Carlin RN  Achieves maximal functionality and self care: Encourage and assist patient to increase activity and self care with guidance from physical therapy/occupational therapy  Note: Pt is bed bound, unable to walk with the PT     Problem: Musculoskeletal - Adult  Goal: Return mobility to safest level of function  Outcome: Not Progressing  Flowsheets  Taken 1/7/2025 1942 by Diomedes Carlin RN  Return Mobility to Safest Level of Function: Assess patient stability and activity tolerance for standing, transferring and ambulating with or without assistive devices  Taken 1/7/2025 1933 by Adeline Desouza RN  Return Mobility to Safest Level of Function: Assess patient stability and activity tolerance for standing, transferring and ambulating with or without assistive devices  Taken 1/7/2025 0930 by Diomedes Carlin RN  Return Mobility to Safest Level of Function:   Assess patient stability and activity tolerance for standing, transferring and ambulating with or without assistive devices   Obtain physical therapy/occupational therapy consults as needed   Ensure adequate protection for wounds/incisions during mobilization   Assist with transfers and ambulation using safe patient handling equipment as needed  Note: Unable to sit and dangle on the bed.     Problem: Safety - Adult  Goal: Free from fall injury  Outcome: 
  Problem: Occupational Therapy - Adult  Goal: By Discharge: Performs self-care activities at highest level of function for planned discharge setting.  See evaluation for individualized goals.  Description: Occupational Therapy Goals:  Initiated 12/26/2024 to be met within 7-10 days.    1.  Patient will perform grooming with supervision/set-up while seated on EOB with supervision for balance.   2.  Patient will perform upper body dressing with supervision/set-up.  3.  Patient will perform lower body dressing  with minimal assistance.  4.  Patient will perform toilet transfers with moderate assistance  5.  Patient will participate in upper extremity therapeutic exercise/activities with supervision/set-up for 8-10 minutes to increase strength/endurance for ADLs.      PLOF: Patient unable to accurately state prior level of function but was living with his wife at home and they were not able to take care of themselves or each other.  He was using a rolling walker at some point to ambulate.     Outcome: Progressing   OCCUPATIONAL THERAPY EVALUATION    Patient: Stefano Begum Jr. (84 y.o. 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  He was seen with PT to maximize functional progress and safety.  Sparks placed over his lower body as hospital gown was doffed in addition to the sheet/blanket.  Patient unable to unwilling to answer questions related to his prior level and then stated he didn't know where he was when prompted.  He did state the correct date.  Patient resistant to participate in functional tasks but was observed to move his extremities in the bed.  Assist x2 given to scoot patient up to the head of bed.  He was left with all needs met at the end of the session. 
  Problem: Occupational Therapy - Adult  Goal: By Discharge: Performs self-care activities at highest level of function for planned discharge setting.  See evaluation for individualized goals.  Description: Occupational Therapy Goals:  Initiated 12/26/2024 to be met within 7-10 days.    1.  Patient will perform grooming with supervision/set-up while seated on EOB with supervision for balance.   2.  Patient will perform upper body dressing with supervision/set-up.  3.  Patient will perform lower body dressing  with minimal assistance.  4.  Patient will perform toilet transfers with moderate assistance  5.  Patient will participate in upper extremity therapeutic exercise/activities with supervision/set-up for 8-10 minutes to increase strength/endurance for ADLs.      PLOF: Patient unable to accurately state prior level of function but was living with his wife at home and they were not able to take care of themselves or each other.  He was using a rolling walker at some point to ambulate.     Outcome: Progressing   OCCUPATIONAL THERAPY TREATMENT    Patient: Stefano Begum Jr. (84 y.o. 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  ASSESSMENT:  Pt is pleasantly confused. Appears cathectic. Agreeable to am ADL seated EOB. (See functional levels below) Pt w/poor insight into deficits. Pt requires MOD/MAX A w/ADLs 2/2 generalized weakness. Pt demonstrates increase difficulty w/container mgt from what appears to be arthritic hands. Attempted STS 3 trials w/rocking technique and from elevated surface. Pt not bearing weight through BLE. Returned to supine and left w/all items with reach. Encouraged BUE Therex throughout the day to increase activity tolerance for carryover w/ADLs.     Progression toward 
  Problem: Occupational Therapy - Adult  Goal: By Discharge: Performs self-care activities at highest level of function for planned discharge setting.  See evaluation for individualized goals.  Description: Occupational Therapy Goals:  Initiated 12/26/2024, Re-evaluated 1/1/2025 goals to be continued to be met within 7-10 days.    1.  Patient will perform grooming with supervision/set-up while seated on EOB with supervision for balance.   2.  Patient will perform upper body dressing with supervision/set-up.  3.  Patient will perform lower body dressing  with minimal assistance.  4.  Patient will perform toilet transfers with moderate assistance  5.  Patient will participate in upper extremity therapeutic exercise/activities with supervision/set-up for 8-10 minutes to increase strength/endurance for ADLs.          PLOF:Patient reports he needed assist prn with ADLs and used a rollator.     Outcome: Progressing   OCCUPATIONAL THERAPY TREATMENT    Patient: Stefano Begum Jr. (84 y.o. 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  ASSESSMENT:  PT co tx to maximize pt safety and participation. Pt presented supine in bed upon entry and agreeable. He was assisted to EOB MOD A in prep for functional tasks requiring vc's for proper hand placement to ensure safe transition. Attempted STS transfer MAX X A x 2 x 2 trials in prep for BSC transfer, pt unsuccessful at this time. Pt was found to be soiled from BM. He was returned back to supine and required MAX A for minerva area hygiene and MOD A donning clean hospital gown. Pt positioned for 
  Problem: Occupational Therapy - Adult  Goal: By Discharge: Performs self-care activities at highest level of function for planned discharge setting.  See evaluation for individualized goals.  Description: Occupational Therapy Goals:  Initiated 12/26/2024, Re-evaluated 1/1/2025 goals to be continued to be met within 7-10 days.    1.  Patient will perform grooming with supervision/set-up while seated on EOB with supervision for balance.   2.  Patient will perform upper body dressing with supervision/set-up.  3.  Patient will perform lower body dressing  with minimal assistance.  4.  Patient will perform toilet transfers with moderate assistance  5.  Patient will participate in upper extremity therapeutic exercise/activities with supervision/set-up for 8-10 minutes to increase strength/endurance for ADLs.          PLOF:Patient reports he needed assist prn with ADLs and used a rollator.     Outcome: Progressing   OCCUPATIONAL THERAPY TREATMENT    Patient: Stefano Begum Jr. (84 y.o. 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  ASSESSMENT:  Pt presents in bed agreeable to OT tx. Pt does require frequent redirection to task and increased time for task initiation and completion. See functional levels below for grooming tasks, LB dressing and bed mobility. Pt attempts scooting to the right on EOB, however is unable to perform task at this time, however pt is able to scoot to HOB with min A in supine. Pt declines any standing task or xfers at this time.    Progression toward goals:  []          Improving appropriately and 
  Problem: Occupational Therapy - Adult  Goal: By Discharge: Performs self-care activities at highest level of function for planned discharge setting.  See evaluation for individualized goals.  Description: Occupational Therapy Goals:  Initiated 12/26/2024, Re-evaluated 1/8/2025 goals to be continued to be met within 7-10 days.    1.  Patient will perform grooming with supervision/set-up while seated on EOB with supervision for balance.   2.  Patient will perform upper body dressing with supervision/set-up.  3.  Patient will perform lower body dressing  with minimal assistance.  4.  Patient will perform toilet transfers with moderate assistance  5.  Patient will participate in upper extremity therapeutic exercise/activities with supervision/set-up for 8-10 minutes to increase strength/endurance for ADLs.          PLOF:Patient reports he needed assist prn with ADLs and used a rollator.     Outcome: Progressing   OCCUPATIONAL THERAPY RE-EVALUATION    Patient: Stefano Begum Jr. (84 y.o. 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 Pt agreeable to bed mobility, SBA supine -> sit edge of bed, vc's to scoot to edge of bed to square off for upright posture in prep for ADL tasks, pt unable to tolerate > 3 mins seated edge of bed as evidenced by max vc's to encourage maintaining sitting balance, pt declined opting to lay self back down supine, vc's for scooting up in bed using bed rails requiring max A in trendelenburg position. Pt noted with impaired (R)UE AROM, demonstrates SROM using LUE WFL.  Pt laying semi-reclined in bed at end of tx session, call 
  Problem: Pain  Goal: Verbalizes/displays adequate comfort level or baseline comfort level  12/27/2024 2150 by Rebeka Garcia RN  Outcome: Progressing  Flowsheets (Taken 12/27/2024 2150)  Verbalizes/displays adequate comfort level or baseline comfort level:   Encourage patient to monitor pain and request assistance   Assess pain using appropriate pain scale   Administer analgesics based on type and severity of pain and evaluate response   Implement non-pharmacological measures as appropriate and evaluate response  Note: Patient will report pain to staff and take pain medications as indicated.  12/27/2024 1119 by Paula Serna GN  Outcome: Progressing  Flowsheets (Taken 12/24/2024 2321 by Adeline Desouza, RN)  Verbalizes/displays adequate comfort level or baseline comfort level:   Encourage patient to monitor pain and request assistance   Assess pain using appropriate pain scale   Administer analgesics based on type and severity of pain and evaluate response   Implement non-pharmacological measures as appropriate and evaluate response   Consider cultural and social influences on pain and pain management  Note: Pain will be managed and controlled with a pain level of 0 /10 or less. Patient educated on notifying nurse if pain increases above 0.      Problem: Nutrition Deficit:  Goal: Optimize nutritional status  12/27/2024 2150 by Rebeka Garcia RN  Outcome: Progressing  Flowsheets  Taken 12/27/2024 2150 by Rebeka Garcia RN  Nutrient intake appropriate for improving, restoring, or maintaining nutritional needs:   Monitor oral intake, labs, and treatment plans   Recommend appropriate diets, oral nutritional supplements, and vitamin/mineral supplements  Taken 12/27/2024 1439 by Elin Sullivan, BRIANA  Nutrient intake appropriate for improving, restoring, or maintaining nutritional needs:   Assess nutritional status and recommend course of action   Monitor oral intake, labs, and treatment plans   
  Problem: Physical Therapy - Adult  Goal: By Discharge: Performs mobility at highest level of function for planned discharge setting.  See evaluation for individualized goals.  Description: Initiated  12/26/24  to be met within 7-10 days.    1.  Patient will move from supine to sit and sit to supine , scoot up and down, and roll side to side in bed with minimal assistance.    2.  Patient will transfer from bed to chair and chair to bed with minimal assistance using the least restrictive device.  3.  Patient will perform sit to stand with minimal assistance.  4.  Patient will ambulate with minimal assistance for 10 feet with the least restrictive device.       PLOF: Lives with spouse in 1 story home with ramped entrance. Reports use of RW, frequent falls.    Outcome: Progressing   PHYSICAL THERAPY EVALUATION    Patient: Stefano Begum Jr. (84 y.o. 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 Pt received in bed in [facility] and educated on PT role and evaluation process,agreeable. Seen alongside OT to maximize pt participation. Pt AOx2 to person and time, requires orientation to place and situation and intemrittent cues throughout session for redirection. Limited by overall participation today. Reports pain in posterior thigh on R, not TTP but increase pain at rest. Declined OOB/EOB mobility at this time despite encouragement and benefit of mobility. Max  x 2 to scoot up in bed for proper positioning and RLE heel floated. Pt unable to state is he was comfortable or not.  Pt demonstrates deficits in strength, mobility, balance, gait, and safety and will benefit from skilled acute care PT during admission. All needs and call bell left within reach.       DEFICITS/IMPAIRMENTS:    , Body Structures, Functions, Activity Limitations Requiring Skilled Therapeutic Intervention: Decreased functional mobility 
  Problem: Physical Therapy - Adult  Goal: By Discharge: Performs mobility at highest level of function for planned discharge setting.  See evaluation for individualized goals.  Description: Re-evaluated 1/1/2025 and updated below.   Initiated  12/26/24  to be met within 7-10 days.    1.  Patient will move from supine to sit and sit to supine in bed with minimal assistance.    2.  Patient will transfer from bed to chair and chair to bed with moderate assistance using the least restrictive device.  3.  Patient will perform sit to stand with moderate assistance.  4.  Patient will ambulate with moderate assistance for 3 feet with the least restrictive device.       PLOF: Lives with spouse in 1 story home with ramped entrance. Reports use of RW, frequent falls.    Outcome: Progressing   PHYSICAL THERAPY TREATMENT    Patient: Stefano Begum Jr. (84 y.o. 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 Pt mod A to EOB with increase cueing for UE reach across, assist mainly for trunk. Fair + seated balance with intermittent postural sway. X 2 attempts sit to stand with max x 2, unsuccessful with poor postioning of UE despite cutes. Pt scoots laterally along EOB and CGA sit to sup. Min A to scoot up in bed and rolls R/L due to BM. HOB elevated and left with all needs and call bell within reach.    Progression toward goals:   []      Improving appropriately and progressing toward goals  [x]      Improving slowly and progressing toward goals  []      Not making progress toward goals and plan of care 
  Problem: Safety - Adult  Goal: Free from fall injury  1/5/2025 0214 by Renetta Mendez, RN  Outcome: Progressing  Flowsheets (Taken 1/5/2025 0214)  Free From Fall Injury: Based on caregiver fall risk screen, instruct family/caregiver to ask for assistance with transferring infant if caregiver noted to have fall risk factors  Note: Ensure bed alarm is on throughout the shift and pt can access his call light when he needs help.  1/4/2025 1758 by Marilynn Singh, RN  Outcome: Progressing  Flowsheets (Taken 1/2/2025 1746 by Annita Cerrato, RN)  Free From Fall Injury: Instruct family/caregiver on patient safety     Problem: Infection - Adult  Goal: Absence of infection at discharge  1/5/2025 0214 by Renetta Mendez RN  Outcome: Progressing  Flowsheets (Taken 1/4/2025 2000)  Absence of infection at discharge: Assess and monitor for signs and symptoms of infection  Note: Administer the prescribed antibiotics  1/4/2025 1758 by Marilynn Singh, RN  Outcome: Progressing     
  Problem: Safety - Adult  Goal: Free from fall injury  Outcome: Progressing  Flowsheets (Taken 12/31/2024 1938)  Free From Fall Injury: Instruct family/caregiver on patient safety  Note: Remained safe form fall or injury     Problem: Pain  Goal: Verbalizes/displays adequate comfort level or baseline comfort level  Outcome: Progressing  Flowsheets (Taken 12/31/2024 1938)  Verbalizes/displays adequate comfort level or baseline comfort level: Encourage patient to monitor pain and request assistance  Note: Denies pain at this time     
  Problem: Skin/Tissue Integrity  Goal: Absence of new skin breakdown  Description: 1.  Monitor for areas of redness and/or skin breakdown  2.  Assess vascular access sites hourly  3.  Every 4-6 hours minimum:  Change oxygen saturation probe site  4.  Every 4-6 hours:  If on nasal continuous positive airway pressure, respiratory therapy assess nares and determine need for appliance change or resting period.  1/1/2025 2257 by Darlin Carr RN  Outcome: Progressing  Note: No new skin integrity observered.    1/1/2025 1029 by Meg Curiel RN  Outcome: Progressing     Problem: Safety - Adult  Goal: Free from fall injury  1/1/2025 2257 by Darlin Carr RN  Outcome: Progressing  Flowsheets (Taken 1/1/2025 2257)  Free From Fall Injury: Instruct family/caregiver on patient safety  1/1/2025 1029 by Meg Curiel RN  Outcome: Progressing  Flowsheets (Taken 1/1/2025 1029)  Free From Fall Injury: Instruct family/caregiver on patient safety     Problem: Pain  Goal: Verbalizes/displays adequate comfort level or baseline comfort level  1/1/2025 2257 by Darlin Carr RN  Outcome: Progressing  Flowsheets (Taken 1/1/2025 2257)  Verbalizes/displays adequate comfort level or baseline comfort level:   Encourage patient to monitor pain and request assistance   Assess pain using appropriate pain scale   Implement non-pharmacological measures as appropriate and evaluate response  1/1/2025 1029 by Meg Curiel RN  Outcome: Progressing  Flowsheets (Taken 1/1/2025 1029)  Verbalizes/displays adequate comfort level or baseline comfort level:   Encourage patient to monitor pain and request assistance   Assess pain using appropriate pain scale   Administer analgesics based on type and severity of pain and evaluate response   Implement non-pharmacological measures as appropriate and evaluate response     Problem: Skin/Tissue Integrity - Adult  Goal: Incisions, wounds, or drain sites healing without S/S of infection  Recent Flowsheet 
  Problem: Skin/Tissue Integrity  Goal: Absence of new skin breakdown  Description: 1.  Monitor for areas of redness and/or skin breakdown  2.  Assess vascular access sites hourly  3.  Every 4-6 hours minimum:  Change oxygen saturation probe site  4.  Every 4-6 hours:  If on nasal continuous positive airway pressure, respiratory therapy assess nares and determine need for appliance change or resting period.  12/29/2024 2121 by Darlin Carr RN  Outcome: Progressing  12/29/2024 1450 by Meg Irving RN  Outcome: Progressing  Note: Patient will be free of any skin breakdown      Problem: ABCDS Injury Assessment  Goal: Absence of physical injury  12/29/2024 2121 by Darlin Carr RN  Outcome: Progressing  Flowsheets (Taken 12/29/2024 1450 by Meg Irving RN)  Absence of Physical Injury: Implement safety measures based on patient assessment  12/29/2024 1450 by Meg Irving RN  Outcome: Progressing  Flowsheets  Taken 12/29/2024 1450 by Meg Irving RN  Absence of Physical Injury: Implement safety measures based on patient assessment  Taken 12/29/2024 0223 by Darlin Carr RN  Absence of Physical Injury: Implement safety measures based on patient assessment  Note: Bed in lowest position and call bell within reach      Problem: Safety - Adult  Goal: Free from fall injury  12/29/2024 2121 by Darlin Carr RN  Outcome: Progressing  Flowsheets (Taken 12/29/2024 2121)  Free From Fall Injury:   Instruct family/caregiver on patient safety   Based on caregiver fall risk screen, instruct family/caregiver to ask for assistance with transferring infant if caregiver noted to have fall risk factors  12/29/2024 1450 by Meg Irving RN  Outcome: Progressing  Flowsheets (Taken 12/29/2024 0221 by Darlin Carr RN)  Free From Fall Injury:   Instruct family/caregiver on patient safety   Based on caregiver fall risk screen, instruct family/caregiver to ask for assistance with transferring infant if caregiver noted to have 
Problem: Occupational Therapy - Adult  Goal: By Discharge: Performs self-care activities at highest level of function for planned discharge setting.  See evaluation for individualized goals.  Description: Occupational Therapy Goals:  Initiated 12/26/2024, Re-evaluated 1/1/2025 goals to be continued to be met within 7-10 days.    1.  Patient will perform grooming with supervision/set-up while seated on EOB with supervision for balance.   2.  Patient will perform upper body dressing with supervision/set-up.  3.  Patient will perform lower body dressing  with minimal assistance.  4.  Patient will perform toilet transfers with moderate assistance  5.  Patient will participate in upper extremity therapeutic exercise/activities with supervision/set-up for 8-10 minutes to increase strength/endurance for ADLs.      PLOF: Patient reports he needed assist prn with ADLs and used a rollator.       Outcome: Progressing      OCCUPATIONAL THERAPY RE-EVALUATION    Patient: Stefano Begum Jr. (84 y.o. 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 Pt supine in bed, oriented to person only and agreeable to participate. Patient re-oriented this session, pleasantly confused. Patient required repetition to approx. 60% of commands this session, [place]? Patient min assist for rolling in preparation for ADLs, total assist for BM hygiene, moderate assist for supine > sit, total assist for donning R sock, min assist for manipulation of washcloth to wash face with R hand and contact guard assist for sit > supine. Patient left in bed all needs met eating breakfast with call bell in reach. OT will continue to follow to maximize PLOF. 
Progressing  Flowsheets  Taken 1/7/2025 1944  Free From Fall Injury: Instruct family/caregiver on patient safety  Taken 1/7/2025 0930  Free From Fall Injury: Instruct family/caregiver on patient safety  Note: Pt remained safe from fall or injury     Problem: Pain  Goal: Verbalizes/displays adequate comfort level or baseline comfort level  Outcome: Progressing  Flowsheets  Taken 1/7/2025 1944  Verbalizes/displays adequate comfort level or baseline comfort level:   Encourage patient to monitor pain and request assistance   Implement non-pharmacological measures as appropriate and evaluate response  Taken 1/7/2025 0735  Verbalizes/displays adequate comfort level or baseline comfort level: Encourage patient to monitor pain and request assistance  Note: Pt denies pain at this time     Problem: Skin/Tissue Integrity - Adult  Goal: Incisions, wounds, or drain sites healing without S/S of infection  Outcome: Progressing  Flowsheets  Taken 1/7/2025 1942 by Diomedes Carlin RN  Incisions, Wounds, or Drain Sites Healing Without Sign and Symptoms of Infection: TWICE DAILY: Assess and document skin integrity  Taken 1/7/2025 1933 by Adeline Desouza RN  Incisions, Wounds, or Drain Sites Healing Without Sign and Symptoms of Infection: TWICE DAILY: Assess and document skin integrity  Taken 1/7/2025 0930 by Diomedes Carlin RN  Incisions, Wounds, or Drain Sites Healing Without Sign and Symptoms of Infection: TWICE DAILY: Assess and document skin integrity  Note: No new skin breakdown     Problem: Infection - Adult  Goal: Absence of infection at discharge  Outcome: Progressing  Flowsheets  Taken 1/7/2025 1942 by Diomedes Carlin RN  Absence of infection at discharge:   Instruct and encourage patient and family to use good hand hygiene technique   Assess and monitor for signs and symptoms of infection  Taken 1/7/2025 1933 by Adeline Desouza RN  Absence of infection at discharge: Assess and monitor for signs and 
cognition.     Progression toward goals:   []      Improving appropriately and progressing toward goals  [x]      Improving slowly and progressing toward goals  []      Not making progress toward goals and plan of care will be adjusted     PLAN:  Patient continues to benefit from skilled intervention to address the above impairments.  Continue treatment per established plan of care.    Further Equipment Recommendations for Discharge: hospital bed, mechanical lift, and wheelchair 18 inch    [facility]: AM-PAC Inpatient Mobility Raw Score : 10      Current research shows that an AM-PAC score of 17 (13 without stairs) or less is not associated with a discharge to the patient's home setting.    This AMPA score should be considered in conjunction with interdisciplinary team recommendations to determine the most appropriate discharge setting. Patient's social support, diagnosis, medical stability, and prior level of function should also be taken into consideration.     SUBJECTIVE:   Patient stated, \"my legs don't work.\"    OBJECTIVE DATA SUMMARY:   Critical Behavior:  Orientation  Overall Orientation Status: Impaired  Orientation Level: Oriented to person;Disoriented to time;Disoriented to situation;Disoriented to place  Cognition  Overall Cognitive Status: Exceptions  Arousal/Alertness: Appears intact  Following Commands: Inconsistently follows commands  Attention Span: Difficulty attending to directions  Memory: Impaired  Safety Judgement: Decreased awareness of need for assistance;Decreased awareness of need for safety  Problem Solving: Assistance required to correct errors made;Assistance required to generate solutions  Insights: Decreased awareness of deficits  Initiation: Requires cues for all  Sequencing: Requires cues for all    Functional Mobility Training:  Bed Mobility:  Bed Mobility Training  Bed Mobility Training: Yes  Rolling: Contact-guard assistance  Supine to Sit: Minimum assistance  Sit to Supine: Stand-by 
the bed.     Problem: Neurosensory - Adult  Goal: Achieves maximal functionality and self care  1/7/2025 1942 by Diomedes Carlin RN  Outcome: Not Progressing  Flowsheets  Taken 1/7/2025 1942 by Diomedes Carlin RN  Achieves maximal functionality and self care: Encourage and assist patient to increase activity and self care with guidance from physical therapy/occupational therapy  Taken 1/7/2025 1933 by Adeline Desouza RN  Achieves maximal functionality and self care: Encourage and assist patient to increase activity and self care with guidance from physical therapy/occupational therapy  Taken 1/7/2025 0930 by Diomedes Carlin RN  Achieves maximal functionality and self care: Encourage and assist patient to increase activity and self care with guidance from physical therapy/occupational therapy  Note: Pt is bed bound, unable to walk with the PT     Problem: Musculoskeletal - Adult  Goal: Return mobility to safest level of function  1/7/2025 1942 by Diomedes Carlin RN  Outcome: Not Progressing  Flowsheets  Taken 1/7/2025 1942 by Diomedes Carlin RN  Return Mobility to Safest Level of Function: Assess patient stability and activity tolerance for standing, transferring and ambulating with or without assistive devices  Taken 1/7/2025 1933 by Adeline Desouza RN  Return Mobility to Safest Level of Function: Assess patient stability and activity tolerance for standing, transferring and ambulating with or without assistive devices  Taken 1/7/2025 0930 by Diomedes Carlin RN  Return Mobility to Safest Level of Function:   Assess patient stability and activity tolerance for standing, transferring and ambulating with or without assistive devices   Obtain physical therapy/occupational therapy consults as needed   Ensure adequate protection for wounds/incisions during mobilization   Assist with transfers and ambulation using safe patient handling equipment as needed  Note: Unable to sit and 
Adequate for Discharge  Goal: Achieves maximal functionality and self care  Outcome: Adequate for Discharge     Problem: Cardiovascular - Adult  Goal: Maintains optimal cardiac output and hemodynamic stability  Outcome: Adequate for Discharge  Goal: Absence of cardiac dysrhythmias or at baseline  Outcome: Adequate for Discharge     Problem: Skin/Tissue Integrity - Adult  Goal: Incisions, wounds, or drain sites healing without S/S of infection  Outcome: Adequate for Discharge     Problem: Musculoskeletal - Adult  Goal: Return mobility to safest level of function  Outcome: Adequate for Discharge  Goal: Maintain proper alignment of affected body part  Outcome: Adequate for Discharge  Goal: Return ADL status to a safe level of function  Outcome: Adequate for Discharge     Problem: Infection - Adult  Goal: Absence of infection at discharge  Outcome: Adequate for Discharge  Goal: Absence of infection during hospitalization  Outcome: Adequate for Discharge     Problem: Metabolic/Fluid and Electrolytes - Adult  Goal: Electrolytes maintained within normal limits  Outcome: Adequate for Discharge  Goal: Hemodynamic stability and optimal renal function maintained  Outcome: Adequate for Discharge     Problem: Hematologic - Adult  Goal: Maintains hematologic stability  Outcome: Adequate for Discharge     Problem: Genitourinary - Adult  Goal: Absence of urinary retention  Outcome: Adequate for Discharge     
Training  Transfer Training: No  Balance:  Balance  Sitting: Impaired  Sitting - Static: Fair (occasional)  Sitting - Dynamic: Fair (occasional)        Ambulation/Gait Training:     Gait  Gait Training: No        Pain:  Intensity Pre-treatment: 0/10   Intensity Post-treatment: 0/10  Scale: Numeric Rating Scale      Activity Tolerance:   Activity Tolerance: Treatment limited secondary to decreased cognition  Please refer to the flowsheet for vital signs taken during this treatment.  After treatment:   [] Patient left in no apparent distress sitting up in chair  [x] Patient left in no apparent distress in bed  [x] Call bell left within reach  [x] Nursing notified  [] Caregiver present  [x] Bed alarm activated  [] SCDs applied      COMMUNICATION/EDUCATION:   Patient Education  Education Given To: Patient  Education Provided: Role of Therapy;Plan of Care  Barriers to Learning: Cognition  Education Outcome: Verbalized understanding;Demonstrated understanding;Continued education needed      Brit Elizondo, PT  Minutes: 23    
and treatment plans   Recommend appropriate diets, oral nutritional supplements, and vitamin/mineral supplements     
increase strength/endurance for ADLs.          PLOF:Patient reports he needed assist prn with ADLs and used a rollator.     1/3/2025 1243 by Phoebe Ernandez OTA  Outcome: Progressing     Problem: Neurosensory - Adult  Goal: Achieves stable or improved neurological status  Outcome: Progressing  Flowsheets (Taken 1/2/2025 2014 by Darlin Carr, RN)  Achieves stable or improved neurological status:   Assess for and report changes in neurological status   Initiate measures to prevent increased intracranial pressure  Goal: Achieves maximal functionality and self care  Outcome: Progressing  Flowsheets (Taken 1/2/2025 2014 by Darlin Carr, RN)  Achieves maximal functionality and self care: Encourage and assist patient to increase activity and self care with guidance from physical therapy/occupational therapy     Problem: Cardiovascular - Adult  Goal: Maintains optimal cardiac output and hemodynamic stability  Outcome: Progressing  Flowsheets (Taken 1/2/2025 2014 by Darlin Carr, RN)  Maintains optimal cardiac output and hemodynamic stability:   Monitor blood pressure and heart rate   Assess for signs of decreased cardiac output  Goal: Absence of cardiac dysrhythmias or at baseline  Outcome: Progressing  Flowsheets (Taken 1/2/2025 2014 by Darlin Carr, RN)  Absence of cardiac dysrhythmias or at baseline:   Monitor cardiac rate and rhythm   Assess for signs of decreased cardiac output   Administer antiarrhythmia medication and electrolyte replacement as ordered     Problem: Skin/Tissue Integrity - Adult  Goal: Incisions, wounds, or drain sites healing without S/S of infection  Outcome: Progressing     Problem: Musculoskeletal - Adult  Goal: Return mobility to safest level of function  Outcome: Progressing  Flowsheets (Taken 1/2/2025 2014 by Darlin Carr, RN)  Return Mobility to Safest Level of Function:   Assess patient stability and activity tolerance for standing, transferring and ambulating with or without 
status:   Assess for and report changes in neurological status   Initiate measures to prevent increased intracranial pressure   Maintain blood pressure and fluid volume within ordered parameters to optimize cerebral perfusion and minimize risk of hemorrhage  Goal: Achieves maximal functionality and self care  Outcome: Progressing  Flowsheets (Taken 1/1/2025 1029)  Achieves maximal functionality and self care:   Monitor swallowing and airway patency with patient fatigue and changes in neurological status   Encourage and assist patient to increase activity and self care with guidance from physical therapy/occupational therapy     Problem: Cardiovascular - Adult  Goal: Maintains optimal cardiac output and hemodynamic stability  Outcome: Progressing  Flowsheets (Taken 1/1/2025 1029)  Maintains optimal cardiac output and hemodynamic stability:   Monitor blood pressure and heart rate   Monitor urine output and notify Licensed Independent Practitioner for values outside of normal range   Assess for signs of decreased cardiac output   Administer fluid and/or volume expanders as ordered   Administer vasoactive medications as ordered  Goal: Absence of cardiac dysrhythmias or at baseline  Outcome: Progressing  Flowsheets (Taken 1/1/2025 1029)  Absence of cardiac dysrhythmias or at baseline:   Monitor cardiac rate and rhythm   Assess for signs of decreased cardiac output     Problem: Skin/Tissue Integrity - Adult  Goal: Incisions, wounds, or drain sites healing without S/S of infection  Outcome: Progressing  Flowsheets (Taken 1/1/2025 1029)  Incisions, Wounds, or Drain Sites Healing Without Sign and Symptoms of Infection:   ADMISSION and DAILY: Assess and document risk factors for pressure ulcer development   TWICE DAILY: Assess and document skin integrity   TWICE DAILY: Assess and document dressing/incision, wound bed, drain sites and surrounding tissue   Implement wound care per orders     Problem: Musculoskeletal - 
Achieves stable or improved neurological status  12/29/2024 1450 by Meg Irving RN  Outcome: Progressing  Flowsheets (Taken 12/29/2024 0223 by Darlin Carr RN)  Achieves stable or improved neurological status:   Assess for and report changes in neurological status   Initiate measures to prevent increased intracranial pressure   Maintain blood pressure and fluid volume within ordered parameters to optimize cerebral perfusion and minimize risk of hemorrhage  12/29/2024 0223 by Darlin Carr RN  Outcome: Progressing  Flowsheets (Taken 12/29/2024 0223)  Achieves stable or improved neurological status:   Assess for and report changes in neurological status   Initiate measures to prevent increased intracranial pressure   Maintain blood pressure and fluid volume within ordered parameters to optimize cerebral perfusion and minimize risk of hemorrhage  12/29/2024 0222 by Darlin Carr RN  Outcome: Progressing  12/29/2024 0221 by Darlin Carr RN  Outcome: Progressing  Goal: Achieves maximal functionality and self care  12/29/2024 1450 by Meg Irving RN  Outcome: Progressing  Flowsheets (Taken 12/29/2024 0223 by Darlin Carr RN)  Achieves maximal functionality and self care: Encourage and assist patient to increase activity and self care with guidance from physical therapy/occupational therapy  12/29/2024 0223 by Darlin Carr RN  Outcome: Progressing  Flowsheets (Taken 12/29/2024 0223)  Achieves maximal functionality and self care: Encourage and assist patient to increase activity and self care with guidance from physical therapy/occupational therapy  12/29/2024 0222 by Darlin Carr RN  Outcome: Progressing  12/29/2024 0221 by Darlin Carr RN  Outcome: Progressing     
follows commands  Attention Span: Difficulty attending to directions  Memory: Impaired  Safety Judgement: Decreased awareness of need for assistance;Decreased awareness of need for safety  Problem Solving: Assistance required to correct errors made;Assistance required to generate solutions  Insights: Decreased awareness of deficits  Initiation: Requires cues for all  Sequencing: Requires cues for all    Strength:    Strength: Generally decreased, functional    Tone & Sensation:   Tone: Normal  Sensation: Impaired    Range Of Motion:  AROM: Generally decreased, functional  PROM: Within functional limits    Functional Mobility:  Bed Mobility:     Bed Mobility Training  Bed Mobility Training: Yes  Rolling: Contact-guard assistance  Supine to Sit: Minimum assistance  Sit to Supine: Contact-guard assistance  Scooting: Maximum assistance (up in bed)          Balance:               Balance  Sitting: Impaired  Sitting - Static: Fair (occasional)  Sitting - Dynamic: Fair (occasional)                                            Pain:  Intensity Pre-treatment: 0/10   Intensity Post-treatment: 0/10  Scale: Numeric Rating Scale    Activity Tolerance:   Activity Tolerance: Treatment limited secondary to decreased cognition  Please refer to the flowsheet for vital signs taken during this treatment.    After treatment:   []         Patient left in no apparent distress sitting up in chair  [x]         Patient left in no apparent distress in bed  [x]         Call bell left within reach  [x]         Nursing notified  []         Caregiver present  [x]         Bed alarm activated  []         SCDs applied    COMMUNICATION/EDUCATION:   Patient Education  Education Given To: Patient  Education Provided: Role of Therapy;Plan of Care  Education Method: Demonstration;Verbal;Teach Back  Barriers to Learning: Cognition  Education Outcome: Verbalized understanding;Demonstrated understanding;Continued education needed    Thank you for this 
referral.  Theresa Dean, PT  Minutes: 21    
S/S of infection  1/7/2025 0106 by Main Irving GN  Outcome: Progressing  Flowsheets (Taken 1/5/2025 2006 by Adeline Desouza RN)  Incisions, Wounds, or Drain Sites Healing Without Sign and Symptoms of Infection: TWICE DAILY: Assess and document skin integrity  Note: Pt wounds will be absent from infection.   1/6/2025 1117 by Janis Irene RN  Outcome: Progressing  Flowsheets (Taken 1/5/2025 2006 by Adeline Desouza RN)  Incisions, Wounds, or Drain Sites Healing Without Sign and Symptoms of Infection: TWICE DAILY: Assess and document skin integrity  Note: Pt skin has some scattered bruising, with a wound on L foot     Problem: Musculoskeletal - Adult  Goal: Return mobility to safest level of function  1/7/2025 0106 by Main Irving GN  Outcome: Progressing  Flowsheets (Taken 1/5/2025 2006 by Adeline Desouza RN)  Return Mobility to Safest Level of Function: Assess patient stability and activity tolerance for standing, transferring and ambulating with or without assistive devices  Note: Pt has assistance when needed.   1/6/2025 1117 by Janis Irene RN  Outcome: Progressing  Flowsheets (Taken 1/5/2025 2006 by Adeline Desouza RN)  Return Mobility to Safest Level of Function: Assess patient stability and activity tolerance for standing, transferring and ambulating with or without assistive devices  Note: Pt being seen by PT/OT... can ambulate with assistance  Goal: Maintain proper alignment of affected body part  Outcome: Progressing  Flowsheets (Taken 1/5/2025 2006 by Adeline Desouza RN)  Maintain proper alignment of affected body part: Support and protect limb and body alignment per provider's orders  Note: Pt left foot to remain aligned and supported.  Goal: Return ADL status to a safe level of function  Outcome: Progressing  Flowsheets (Taken 1/5/2025 2006 by Adeline Desouza RN)  Return ADL Status to a Safe Level of Function: Assess activities of daily living deficits and

## 2025-01-08 NOTE — PROGRESS NOTES
DIAGNOSTIC IMAGING       Xray Result (most recent):  XR FEMUR LEFT (MIN 2 VIEWS) 12/30/2024    Narrative  Femur series.  CPT code 61266    Clinical history: Patient scheduled for removal of intramedullary mia left  femur.    Technique: Two views of the femur.    Comparison: No priors.    Findings: There is intramedullary mia left femur with a screw directed into the  femoral neck and the distal transfixing screw traversing healed deformity  proximal femur..    No periprosthetic fractures. Patient osteopenic.    There are hypertrophic changes at the hip and tricompartment joint space  narrowing at the knee.    There are heavy arterial calcifications in the leg.    Impression  No acute abnormalities.    Electronically signed by Latesha Villarreal MD  12/30/2024 2:25 PM                  
                 Dr. Ruby Osei MD has informed our hospital team  that this patient has 4 children but no documentation of the patient stating he does not want their input, without any signed MPOA.  The wife is the NOK (next of kin).     Await family decision as well as our legal risk-management team and how to proceed.    ISAIAH Villarreal MD  1/1/2025 9:16 AM    
                 He will need to be off blood thinners, for least 24 hours, prior to his surgery.  He currently is on Lovenox blood thinners.      Await family decision as well as our legal risk-management team and how to proceed.    ISAIAH Villarreal MD  1/1/2025 9:38 PM    
                 I called the wife and left a message on her cellular phone, to inform her that we like to perform amputation, and this patient, and her , to be formed with the vascular service, and I remove the intramedullary hardware.  He been unable to obtain consent legal consent.  Ethics committee, has been consulted, as well as risk-management.    // Dinorah Albertss  Spouse  Emergency Contact  [phone]  (+1)  [address] [facility] [address] [id number]  Primary Decision Maker    ISAIAH Villarreal MD  12/31/2024 1:11 PM    
                I called his son, Ash Begum , 12/31/2024 6:46 AM  [phone].  I informed that we will reach out to him again, today, to get phone consent witnessed by 2 nurses for the procedure.  Explained to him and re explain to him that the need to remove the hardware, from his left his father's left femur in order to facilitate the left AKA, to be performed by the vascular surgeons.    BM. Juan Villarreal MD  12/31/2024 6:47 AM        
               I spoke with the patient's son, Ash Begum, in the presence of several operative room staff nurses, as well as her vascular surgeon.  Patient describes having a complicated relationship, with his parents.  He states that his mother has never had any type of known psychiatric or psychological assessment that would document whether she did not have the proper skills to make medical decisions for herself, and/or her .  He tells me that his dad surgery has been scheduled 4 times.  On 3 occasions surgery had been scheduled at the [facility] and on each of those occasions, the surgeries were canceled because the father of this patient had periods of clarity and was deemed competent to make medical decisions, and he canceled his surgeries.  Ash Begum, tells me, that his mother, wanted him to just come home and die with her and not had any surgery.    He tells me that paperwork had been drafted, for POA, and end-of-life paperwork, but these forms are never properly signed, as such none of the kids have medical decision making authority for his their parents.    I explained to him that Dr. Ruby Osei will additionally assist us with the legal, ethics, department to determine the proper route for this patient in terms of surgical intervention.  He is not septic, he is not in any immediate life-threatening danger from infection or sepsis at this current time so nothing emergent, can be done at this current time.    I will restart his blood thinners, and write for serge Villarreal MD  12/31/2024 2:35 PM    
           [facility]    Progress Note      Patient: Stefano Begum Jr.               Sex: male          DOA: 12/23/2024         YOB: 1940      Age:  84 y.o.        LOS:  LOS: 13 days         S/P  Procedure(s):  *procedure canceled* LEFT LEG AMPUTATION ABOVE KNEE  Removal intramedullary mia LEFT FEMUR; C-ARM; [DEPUY SYNTHES ORTHO]; NEED HANAOR FRACTURE TABLE               Subjective:     Patient seen at bedside resting in no acute distress. He is more responsive today and answering questions appropriately. Able to follow simple commands. Dressing to left foot c/d/I. Had psych eval done 1/3/2025 (see note from Dr. Emmett Holley) and is not competent to make decisions for himself at this time although still refusing surgery. He is denying pain at this time.    Denies cp, sob, abd pain   Objective:      Blood pressure 138/77, pulse 62, temperature 97.4 °F (36.3 °C), temperature source Axillary, resp. rate 14, height 1.778 m (5' 10\"), weight 64.6 kg (142 lb 6.4 oz), SpO2 90%.   Well developed, Well Nourished no distress  Left foot, dressing in place C/D/I  no deformity noted in left lower extremity  Sensory is  intact   Motor is  intact  nv intact  2+distal pulses  Neg calf tenderness    Labs:  All lab results for the last 24 hours reviewed.    Medications Reviewed      Assessment/Plan     Principal Problem:    Osteomyelitis of left lower extremity  Active Problems:    Depression    Hyperlipidemia    Essential hypertension    Dry gangrene (HCC)    Peripheral arterial disease (HCC)    History of atrial fibrillation    BPH (benign prostatic hyperplasia)    Chronic CHF (congestive heart failure) (HCC)    Hypothyroidism    Encounter for palliative care    Goals of care, counseling/discussion    Moderate protein-calorie malnutrition (HCC)    Gangrene (HCC)    Peripheral vascular disease, unspecified (HCC)    Gangrene of foot (HCC)    Delirium  Resolved Problems:    * No resolved 
           Will sign off at this point.    ISAIAH Villarreal MD  1/7/2025 10:55 AM          
    Pharmacist Review and Automatic Dose Adjustment of Prophylactic Enoxaparin    *Review reason for admission/hospital problem list*    The reviewing pharmacist has made an adjustment to the ordered enoxaparin dose or converted to UFH per the approved [facility] protocol and table as identified below.        Stefano Begum Jr. is a 84 y.o. male.     Recent Labs     12/23/24  1840 12/24/24  0433 12/25/24  0434   CREATININE 0.71 0.70 0.88       Estimated Creatinine Clearance: 53 mL/min (based on SCr of 0.88 mg/dL).    Height:   Ht Readings from Last 1 Encounters:   12/24/24 1.778 m (5' 10\")     Weight:  Wt Readings from Last 1 Encounters:   12/24/24 62.6 kg (138 lb)               Plan: Based upon the patient's weight and renal function, the ordered enoxaparin dose of 30 mg daily has been changed/converted to 40 mg Daily      Thank you,  KEZIA DAVALOS, [facility]  
  Physician Progress Note      PATIENT:               FUAD VIDALES  [facility] #:                  [id number]  :                       1940  ADMIT DATE:       2024 3:02 PM  DISCH DATE:  RESPONDING  PROVIDER #:        Enrique Chen DO          QUERY TEXT:    Pt admitted with osteomyelitis and gangrene of left foot and has CHF   documented. If possible, please document in progress notes and discharge   summary further specificity regarding the type and acuity of CHF:    The medical record reflects the following:  Risk Factors: 84 year old male, HTN, atherosclerosis.  Clinical Indicators: 2024, PN, Dr. Enrique Chen, DO:  \"Chronic   CHF, unclear whether systolic/diastolic or both.\"  2024, echo (TTE) report:  Interpretation Summary  Image quality is technically difficult. Technically difficult study,   technically difficult study due to patient's body habitus and procedure   performed with the patient in a supine position.  Left Ventricle: Low normal left ventricular systolic function with a visually   estimated EF of 50 - 55%. Left ventricle size is normal. Mildly increased wall   thickness. Findings consistent with mild concentric hypertrophy. There are   regional wall motion abnormalities. Indeterminate diastolic function due to   atrial fibrillation.  Right Ventricle: Right ventricle size is normal. Normal systolic function.   TAPSE is normal.  Aortic Valve: Moderate sclerosis of the aortic valve cusps. Mild   regurgitation.  Mitral Valve: Mild annular calcification. Mild to moderate regurgitation.  Tricuspid Valve: Mild regurgitation. Mildly elevated RVSP, consistent with   mild pulmonary hypertension. The estimated RVSP is 49 mmHg.  Right Atrium: Right atrium is moderately dilated.  Aorta: Mildly dilated aortic root. Ao root diameter is 4.0 cm. Mildly dilated   ascending aorta. Ao ascending diameter is 3.8 cm.  Treatment: atenolol, TTE    Thank you,  HARRY Emanuel RN, 
0734  Assumed care. Assessment completed. Patient awake in bed but confused. Alert to self. Patient reoriented to place. Patient assisted with putting legs back into bed. Patient refuses to eat breakfast. Adjusted up in bed. Call light in reach.    1014 Patient sleeping in bed. Linens changed. No current needs or complaints. Call light in reach.    1319 Patient awake in bed. Patient remains confused. Attempted to assist patient with eating food. Patient refuses food at this time. No other needs. Call light in reach.    1801 Patient states that he is not hungry at this time. Patient states that he will let the staff know when he wants to eat something. No other needs. Call light in reach.      
4 Eyes Skin Assessment     NAME:  Stefano Begum Jr.  YOB: 1940  MEDICAL RECORD NUMBER:  [id number]    The patient is being assessed for  Shift Handoff    I agree that at least one RN has performed a thorough Head to Toe Skin Assessment on the patient. ALL assessment sites listed below have been assessed.      Areas assessed by both nurses:    Head, Face, Ears, Shoulders, Back, Chest, Arms, Elbows, Hands, Sacrum. Buttock, Coccyx, Ischium, Legs. Feet and Heels, and Under Medical Devices         Does the Patient have a Wound? No noted wound(s)       Tr Prevention initiated by RN: No  Wound Care Orders initiated by RN: No    Pressure Injury (Stage 3,4, Unstageable, DTI, NWPT, and Complex wounds) if present, place Wound referral order by RN under : Yes    New Ostomies, if present place, Ostomy referral order under : No     Nurse 1 eSignature: Electronically signed by Janis Irene RN on 1/6/25 at 8:04 PM EST    **SHARE this note so that the co-signing nurse can place an eSignature**    Nurse 2 eSignature: {Esignature:[id number]}    
4 Eyes Skin Assessment     NAME:  Stefano Begum Jr.  YOB: 1940  MEDICAL RECORD NUMBER:  [id number]    The patient is being assessed for  Shift Handoff    I agree that at least one RN has performed a thorough Head to Toe Skin Assessment on the patient. ALL assessment sites listed below have been assessed.      Areas assessed by both nurses:    Head, Face, Ears, Shoulders, Back, Chest, Arms, Elbows, Hands, Sacrum. Buttock, Coccyx, Ischium, Legs. Feet and Heels, and Under Medical Devices         Does the Patient have a Wound? No noted wound(s)       Tr Prevention initiated by RN: Yes  Wound Care Orders initiated by RN: No    Pressure Injury (Stage 3,4, Unstageable, DTI, NWPT, and Complex wounds) if present, place Wound referral order by RN under : No    New Ostomies, if present place, Ostomy referral order under : No     Nurse 1 eSignature: Electronically signed by Darlin Carr RN on 12/30/24 at 7:20 AM EST    **SHARE this note so that the co-signing nurse can place an eSignature**    Nurse 2 eSignature: {Esignature:[id number]}    
4 Eyes Skin Assessment     NAME:  Stefano Begum Jr.  YOB: 1940  MEDICAL RECORD NUMBER:  [id number]    The patient is being assessed for  Shift Handoff    I agree that at least one RN has performed a thorough Head to Toe Skin Assessment on the patient. ALL assessment sites listed below have been assessed.      Areas assessed by both nurses:    Head, Face, Ears, Shoulders, Back, Chest, Arms, Elbows, Hands, Sacrum. Buttock, Coccyx, Ischium, Legs. Feet and Heels, and Under Medical Devices         Does the Patient have a Wound? No noted wound(s)       Tr Prevention initiated by RN: Yes  Wound Care Orders initiated by RN: No    Pressure Injury (Stage 3,4, Unstageable, DTI, NWPT, and Complex wounds) if present, place Wound referral order by RN under : No    New Ostomies, if present place, Ostomy referral order under : No     Nurse 1 eSignature: Electronically signed by Rebeka Garcia RN on 12/28/24 at 5:34 AM EST    **SHARE this note so that the co-signing nurse can place an eSignature**    Nurse 2 eSignature: {Esignature:[id number]}   
4 Eyes Skin Assessment     NAME:  Stefano Begum Jr.  YOB: 1940  MEDICAL RECORD NUMBER:  [id number]    The patient is being assessed for  Shift Handoff    I agree that at least one RN has performed a thorough Head to Toe Skin Assessment on the patient. ALL assessment sites listed below have been assessed.      Areas assessed by both nurses:    Head, Face, Ears, Shoulders, Back, Chest, Arms, Elbows, Hands, Sacrum. Buttock, Coccyx, Ischium, and Legs. Feet and Heels        Does the Patient have a Wound? Yes wound(s) were present on assessment. LDA wound assessment was Initiated and completed by RN       Tr Prevention initiated by RN: Yes  Wound Care Orders initiated by RN: No    Pressure Injury (Stage 3,4, Unstageable, DTI, NWPT, and Complex wounds) if present, place Wound referral order by RN under : No    New Ostomies, if present place, Ostomy referral order under : No     Nurse 1 eSignature: Electronically signed by Adeline Desouza RN on 1/8/25 at 7:08 AM EST    **SHARE this note so that the co-signing nurse can place an eSignature**    Nurse 2 eSignature: {Esignature:[id number]}   
4 Eyes Skin Assessment     NAME:  Stefano Begum Jr.  YOB: 1940  MEDICAL RECORD NUMBER:  [id number]    The patient is being assessed for  Shift Handoff    I agree that at least one RN has performed a thorough Head to Toe Skin Assessment on the patient. ALL assessment sites listed below have been assessed.      Areas assessed by both nurses:    Head, Face, Ears, Shoulders, Back, Chest, Arms, Elbows, Hands, Sacrum. Buttock, Coccyx, Ischium, and Legs. Feet and Heels        Does the Patient have a Wound? Yes wound(s) were present on assessment. LDA wound assessment was Initiated and completed by RN       Tr Prevention initiated by RN: Yes  Wound Care Orders initiated by RN: No    Pressure Injury (Stage 3,4, Unstageable, DTI, NWPT, and Complex wounds) if present, place Wound referral order by RN under : No    New Ostomies, if present place, Ostomy referral order under : No     Nurse 1 eSignature: Electronically signed by CHUY Villarreal on 12/24/24 at 7:36 PM EST    **SHARE this note so that the co-signing nurse can place an eSignature**    Nurse 2 eSignature: {Esignature:[id number]}   
4 Eyes Skin Assessment     NAME:  Stefano Begum Jr.  YOB: 1940  MEDICAL RECORD NUMBER:  [id number]    The patient is being assessed for  Shift Handoff    I agree that at least one RN has performed a thorough Head to Toe Skin Assessment on the patient. ALL assessment sites listed below have been assessed.      Areas assessed by both nurses:    Head, Face, Ears, Shoulders, Back, Chest, Arms, Elbows, Hands, Sacrum. Buttock, Coccyx, Ischium, Legs. Feet and Heels, and Under Medical Devices         Does the Patient have a Wound? Yes wound(s) were present on assessment. LDA wound assessment was Initiated and completed by RN       Tr Prevention initiated by RN: Yes  Wound Care Orders initiated by RN: No    Pressure Injury (Stage 3,4, Unstageable, DTI, NWPT, and Complex wounds) if present, place Wound referral order by RN under : No    New Ostomies, if present place, Ostomy referral order under : No     Nurse 1 eSignature: Electronically signed by CHUY Hess on 1/7/25 at 7:37 AM EST    **SHARE this note so that the co-signing nurse can place an eSignature**    Nurse 2 eSignature: Electronically signed by Diomedes Carlin RN on 1/7/25 at 7:57 PM EST    
4 Eyes Skin Assessment     NAME:  Stefano Begum Jr.  YOB: 1940  MEDICAL RECORD NUMBER:  [id number]    The patient is being assessed for  Shift Handoff    I agree that at least one RN has performed a thorough Head to Toe Skin Assessment on the patient. ALL assessment sites listed below have been assessed.      Areas assessed by both nurses:    Head, Face, Ears, Shoulders, Back, Chest, Arms, Elbows, Hands, Sacrum. Buttock, Coccyx, Ischium, Legs. Feet and Heels, and Under Medical Devices         Does the Patient have a Wound? Yes wound(s) were present on assessment. LDA wound assessment was Initiated and completed by RN       Tr Prevention initiated by RN: Yes  Wound Care Orders initiated by RN: No    Pressure Injury (Stage 3,4, Unstageable, DTI, NWPT, and Complex wounds) if present, place Wound referral order by RN under : No    New Ostomies, if present place, Ostomy referral order under : No     Nurse 1 eSignature: Electronically signed by Darlin Carr RN on 12/29/24 at 7:45 AM EST    **SHARE this note so that the co-signing nurse can place an eSignature**    Nurse 2 eSignature: {Esignature:[id number]}    
4 Eyes Skin Assessment     NAME:  Stefano Begum Jr.  YOB: 1940  MEDICAL RECORD NUMBER:  [id number]    The patient is being assessed for  Shift Handoff    I agree that at least one RN has performed a thorough Head to Toe Skin Assessment on the patient. ALL assessment sites listed below have been assessed.      Areas assessed by both nurses:    Head, Face, Ears, Shoulders, Back, Chest, Arms, Elbows, Hands, Sacrum. Buttock, Coccyx, Ischium, Legs. Feet and Heels, Under Medical Devices , and Other ***        Does the Patient have a Wound? Yes wound(s) were present on assessment. LDA wound assessment was Initiated and completed by RN       Tr Prevention initiated by RN: Yes  Wound Care Orders initiated by RN: No    Pressure Injury (Stage 3,4, Unstageable, DTI, NWPT, and Complex wounds) if present, place Wound referral order by RN under : No    New Ostomies, if present place, Ostomy referral order under : No     Nurse 1 eSignature: Electronically signed by Diomedes Carlin RN on 12/31/24 at 7:39 PM EST    **SHARE this note so that the co-signing nurse can place an eSignature**    Nurse 2 eSignature: {Esignature:[id number]}   
4 Eyes Skin Assessment     NAME:  Stefano Begum Jr.  YOB: 1940  MEDICAL RECORD NUMBER:  [id number]    The patient is being assessed for  Shift Handoff    I agree that at least one RN has performed a thorough Head to Toe Skin Assessment on the patient. ALL assessment sites listed below have been assessed.      Areas assessed by both nurses:    Head, Face, Ears, Shoulders, Back, Chest, Arms, Elbows, Hands, Sacrum. Buttock, Coccyx, Ischium, Legs. Feet and Heels, Under Medical Devices , and Other ***        Does the Patient have a Wound? Yes wound(s) were present on assessment. LDA wound assessment was Initiated and completed by RN       Tr Prevention initiated by RN: Yes  Wound Care Orders initiated by RN: No    Pressure Injury (Stage 3,4, Unstageable, DTI, NWPT, and Complex wounds) if present, place Wound referral order by RN under : No    New Ostomies, if present place, Ostomy referral order under : No     Nurse 1 eSignature: Electronically signed by Diomedes Carlin RN on 1/7/25 at 7:58 PM EST    **SHARE this note so that the co-signing nurse can place an eSignature**    Nurse 2 eSignature: {Esignature:[id number]}   
4 Eyes Skin Assessment     NAME:  Stefano Begum Jr.  YOB: 1940  MEDICAL RECORD NUMBER:  [id number]    The patient is being assessed for  Shift Handoff    I agree that at least one RN has performed a thorough Head to Toe Skin Assessment on the patient. ALL assessment sites listed below have been assessed.      Areas assessed by both nurses:    Head, Face, Ears, Shoulders, Back, Chest, Arms, Elbows, Hands, Sacrum. Buttock, Coccyx, Ischium, Legs. Feet and Heels, and Under Medical Devices         Does the Patient have a Wound? Yes wound(s) were present on assessment. LDA wound assessment was Initiated and completed by RN       Tr Prevention initiated by RN: Yes  Wound Care Orders initiated by RN: No    Pressure Injury (Stage 3,4, Unstageable, DTI, NWPT, and Complex wounds) if present, place Wound referral order by RN under : Yes    New Ostomies, if present place, Ostomy referral order under : No     Nurse 1 eSignature: Electronically signed by Renetta Mendez RN on 1/5/25 at 5:11 AM EST    **SHARE this note so that the co-signing nurse can place an eSignature**    Nurse 2 eSignature: {Esignature:[id number]}   
4 Eyes Skin Assessment     NAME:  Stefano Begum Jr.  YOB: 1940  MEDICAL RECORD NUMBER:  [id number]    The patient is being assessed for  Shift Handoff    I agree that at least one RN has performed a thorough Head to Toe Skin Assessment on the patient. ALL assessment sites listed below have been assessed.      Areas assessed by both nurses:    Head, Face, Ears, Shoulders, Back, Chest, Arms, Elbows, Hands, Sacrum. Buttock, Coccyx, Ischium, Legs. Feet and Heels, and Under Medical Devices         Does the Patient have a Wound? Yes wound(s) were present on assessment. LDA wound assessment was Initiated and completed by RN       Tr Prevention initiated by RN: No  Wound Care Orders initiated by RN: No    Pressure Injury (Stage 3,4, Unstageable, DTI, NWPT, and Complex wounds) if present, place Wound referral order by RN under : No    New Ostomies, if present place, Ostomy referral order under : No     Nurse 1 eSignature: Electronically signed by Jessica Irving RN on 12/25/24 at 7:53 PM EST    **SHARE this note so that the co-signing nurse can place an eSignature**    Nurse 2 eSignature: Beverly Boo RN   
4 Eyes Skin Assessment     NAME:  Stefano Begum Jr.  YOB: 1940  MEDICAL RECORD NUMBER:  [id number]    The patient is being assessed for  Shift Handoff    I agree that at least one RN has performed a thorough Head to Toe Skin Assessment on the patient. ALL assessment sites listed below have been assessed.      Areas assessed by both nurses:    Head, Face, Ears, Shoulders, Back, Chest, Arms, Elbows, Hands, and Sacrum. Buttock, Coccyx, Ischium        Does the Patient have a Wound? No noted wound(s)       Tr Prevention initiated by RN: No  Wound Care Orders initiated by RN: No    Pressure Injury (Stage 3,4, Unstageable, DTI, NWPT, and Complex wounds) if present, place Wound referral order by RN under : No    New Ostomies, if present place, Ostomy referral order under : No     Nurse 1 eSignature: Electronically signed by Adeline Desouza RN on 12/24/24 at 6:59 AM EST    **SHARE this note so that the co-signing nurse can place an eSignature**    Nurse 2 eSignature: Electronically signed by CHUY Villarreal on 12/24/24 at 10:53 AM EST   
4 Eyes Skin Assessment     NAME:  Stefano Begum Jr.  YOB: 1940  MEDICAL RECORD NUMBER:  [id number]    The patient is being assessed for  Shift Handoff    I agree that at least one RN has performed a thorough Head to Toe Skin Assessment on the patient. ALL assessment sites listed below have been assessed.      Areas assessed by both nurses:    Head, Face, Ears, Shoulders, Back, Chest, Arms, Elbows, Hands, Sacrum. Buttock, Coccyx, Ischium, and Legs. Feet and Heels        Does the Patient have a Wound? Yes wound(s) were present on assessment. LDA wound assessment was Initiated and completed by RN       Tr Prevention initiated by RN: No  Wound Care Orders initiated by RN: No    Pressure Injury (Stage 3,4, Unstageable, DTI, NWPT, and Complex wounds) if present, place Wound referral order by RN under : No    New Ostomies, if present place, Ostomy referral order under : No     Nurse 1 eSignature: Electronically signed by Adeline Desouza RN on 1/6/25 at 8:51 AM EST    **SHARE this note so that the co-signing nurse can place an eSignature**    Nurse 2 eSignature: Electronically signed by Janis Irene RN on 1/6/25 at 8:04 PM EST   
4 Eyes Skin Assessment     NAME:  Stefano Begum Jr.  YOB: 1940  MEDICAL RECORD NUMBER:  [id number]    The patient is being assessed for  Shift Handoff    I agree that at least one RN has performed a thorough Head to Toe Skin Assessment on the patient. ALL assessment sites listed below have been assessed.      Areas assessed by both nurses:    Head, Face, Ears, Shoulders, Back, Chest, Arms, Elbows, Hands, Sacrum. Buttock, Coccyx, Ischium, Legs. Feet and Heels, and Under Medical Devices         Does the Patient have a Wound? Yes wound(s) were present on assessment. LDA wound assessment was Initiated and completed by RN       Tr Prevention initiated by RN: Yes  Wound Care Orders initiated by RN: No    Pressure Injury (Stage 3,4, Unstageable, DTI, NWPT, and Complex wounds) if present, place Wound referral order by RN under : No    New Ostomies, if present place, Ostomy referral order under : No     Nurse 1 eSignature: Electronically signed by Darlin Carr RN on 1/2/25 at 8:01 AM EST    **SHARE this note so that the co-signing nurse can place an eSignature**    Nurse 2 eSignature: {Esignature:[id number]}    
4 Eyes Skin Assessment     NAME:  Stefano Begum Jr.  YOB: 1940  MEDICAL RECORD NUMBER:  [id number]    The patient is being assessed for  Shift Handoff    I agree that at least one RN has performed a thorough Head to Toe Skin Assessment on the patient. ALL assessment sites listed below have been assessed.      Areas assessed by both nurses:    Head, Face, Ears, Shoulders, Back, Chest, Arms, Elbows, Hands, Sacrum. Buttock, Coccyx, Ischium, and Legs. Feet and Heels        Does the Patient have a Wound? Yes wound(s) were present on assessment. LDA wound assessment was Initiated and completed by RN       Tr Prevention initiated by RN: Yes  Wound Care Orders initiated by RN: No    Pressure Injury (Stage 3,4, Unstageable, DTI, NWPT, and Complex wounds) if present, place Wound referral order by RN under : Yes    New Ostomies, if present place, Ostomy referral order under : No     Nurse 1 eSignature: Electronically signed by CHUY Villarreal on 12/27/24 at 7:00 PM EST    **SHARE this note so that the co-signing nurse can place an eSignature**    Nurse 2 eSignature: Electronically signed by Rebeka Garcia RN on 12/27/24 at 8:27 PM EST   
4 Eyes Skin Assessment     NAME:  Stefano Begum Jr.  YOB: 1940  MEDICAL RECORD NUMBER:  [id number]    The patient is being assessed for  Shift Handoff    I agree that at least one RN has performed a thorough Head to Toe Skin Assessment on the patient. ALL assessment sites listed below have been assessed.      Areas assessed by both nurses:    Head, Face, Ears, Shoulders, Back, Chest, Arms, Elbows, Hands, Sacrum. Buttock, Coccyx, Ischium, Legs. Feet and Heels, and Under Medical Devices         Does the Patient have a Wound? Yes wound(s) were present on assessment. LDA wound assessment was Initiated and completed by RN       Tr Prevention initiated by RN: Yes  Wound Care Orders initiated by RN: No    Pressure Injury (Stage 3,4, Unstageable, DTI, NWPT, and Complex wounds) if present, place Wound referral order by RN under : No    New Ostomies, if present place, Ostomy referral order under : No     Nurse 1 eSignature: Electronically signed by Darlin Carr RN on 1/3/25 at 7:28 AM EST    **SHARE this note so that the co-signing nurse can place an eSignature**    Nurse 2 eSignature: {Esignature:[id number]}    
A Latter-day member came to the nurse's station to ask questions. There is a private lock and no information was given.   
Advance Care Planning   Healthcare Decision Maker:    Primary Decision Maker: Dinorah Begum - Spouse - [phone]    Today we documented Decision Maker(s) consistent with Legal Next of Kin hierarchy.     Spiritual Health History and Assessment/Progress Note  [facility]    Spiritual/Emotional Needs,  ,  ,      Name: Stefano Begum Jr. MRN: [id number]    Age: 84 y.o.     Sex: male   Language: English   Islam: Other   Osteomyelitis of left lower extremity     Date: 12/24/2024            Total Time Calculated: (P) 7 min              Spiritual Assessment began in [address] SURGICAL        Referral/Consult From: Multi-disciplinary team   Encounter Overview/Reason: Spiritual/Emotional Needs  Service Provided For: Patient    Marilyn, Belief, Meaning:   Patient has beliefs or practices that help with coping during difficult times  Family/Friends are connected with a marilyn tradition or spiritual practice and have beliefs or practices that help with coping during difficult times      Importance and Influence:  Patient has spiritual/personal beliefs that influence decisions regarding their health  Family/Friends have spiritual/personal beliefs that influence decisions regarding the patient's health    Community:  Patient feels well-supported. Support system includes: Spouse/Partner and Marilyn Community  Family/Friends No family/friends present    Assessment and Plan of Care:     Patient Interventions include: Facilitated expression of thoughts and feelings and Affirmed coping skills/support systems  Family/Friends Interventions include: No family/friends present    Patient Plan of Care: No spiritual needs identified for follow-up  Family/Friends Plan of Care: No spiritual needs identified for follow-up    Electronically signed by TAHIR Villanueva on 12/24/2024 at 2:46 PM        
Assumed care of patient from OSVALDO Tompkins (off going nurse). Patient alert and oriented. No apparent distress noted. Patient offers no concerns and can verbalize needs. Assessment to follow. Call bell within reach. Bed in lowest, locked position.     0908-patient left foot  wrapped per request. Will continue to monitor. Call bell with in reach. Bed in lowest lock position. Bed alarm active and audible.  
Attempted pt for OT tx. Pt unable to be seen 2/2 ABELARDO in OR. Will continue to follow up. Thank you  ELIZA Marquis/LEIGHTON  
[facility] - [facility] Note  Admit Date: 12/23/2024    Assessment:     - Preop evaluation, moderate risk  - Severe PVD with gangrene of the left forefoot and heel.  Ongoing evaluation for LLE amputation. Vascular and podiatry following  - Nuclear stress test done at [facility] 10/2024 revaled fixed apical and inferior defects. No evidence of ischemia. 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   - Echo 12/24/24 EF of 50-55%, hypokinesis in basal inferolateral and mid inferolateral segments.   - Valvular disease. Mild AI, Mild to moderate MR on echo this admission  - Mild PAH with RVSP 49mmHg on Echo 12/24/24   - pAF. Diagnosed earlier this month. Patient not on telemetry.  Rate control with atenolol 50mg. Not on AC due to history of recurrent falls  - Hx of right AAA s/p S/p right hypogastric embolization on 10/28/24 and EVAR 10/31/24  - HTN  - HLD  - Hypothyroidsim  - Malnourished      Primary cardiologist: Initial consult Dr. Fisher     Plan:     Surgery delayed due to concern for confusion, was to have left AKA.  Stable from CV standpoint.      Once again, moderate but acceptable risk for surgery given gangrene.  No further cardiac workup planned, please call if questions.    Subjective:     No chest pain, dyspnea.     Objective:      Patient Vitals for the past 8 hrs:   Temp Pulse Resp BP SpO2   01/04/25 1130 98.2 °F (36.8 °C) 70 18 (!) 144/78 94 %   01/04/25 0813 97.8 °F (36.6 °C) 61 18 (!) 148/85 94 %   01/04/25 0409 98.2 °F (36.8 °C) 65 18 (!) 141/84 96 %         Patient Vitals for the past 96 hrs:   Weight   01/04/25 0600 64.6 kg (142 lb 8 oz)   01/02/25 0600 63 kg (139 lb)                    Intake/Output Summary (Last 24 hours) at 1/4/2025 1146  Last data filed at 1/4/2025 1054  Gross per 24 hour   Intake 480 ml   Output 1500 ml   Net -1020 ml       Physical Exam:  General:  alert, appears stated age, and cooperative  Neck:  
Carlos [facility]   Pharmacy Pharmacokinetic Monitoring Service - Vancomycin    Indication: Bone and Joint Infection, Skin and Soft Tissue Infection  Target Concentration: Goal AUC/ANTHONY 400-600 mg*hr/L  Day of Therapy: 2  Additional Antimicrobials: Piperacillin/Tazobactam    Pertinent Laboratory Values:   Wt Readings from Last 1 Encounters:   12/24/24 62.6 kg (138 lb)     Temp Readings from Last 1 Encounters:   01/02/25 97.8 °F (36.6 °C) (Axillary)     Estimated Creatinine Clearance: 67 mL/min (based on SCr of 0.7 mg/dL).  Recent Labs     01/01/25  0425 01/02/25  0441   CREATININE 0.71 0.70   BUN 18 16   WBC 13.7* 13.8*       Pertinent Cultures:  Culture Date Source Results   12/24 Wound Heavy GNR   MRSA Nasal Swab: N/A. Non-respiratory infection.    Assessment:  Date Current Dose Concentration Timing of Concentration (h) AUC   01/01 1500 mg x 1 - - -   01/02 1250 mg q18h      Note: Serum concentrations collected for AUC dosing may appear elevated if collected in close proximity to the dose administered, this is not necessarily an indication of toxicity    Plan:  Continue current dose of 1250 mg q18h  Anticipated AUC of 496 mg/L.hr and trough concentration of 13.3 mg/L at steady state  Vancomycin concentration ordered for 01/03 @ 0600  Renal labs as indicated  Pharmacy will continue to monitor patient and adjust therapy as indicated    Thank you for the consult,  Tiffanie Sauer RPH  1/2/2025 8:58 AM   
Carlos [facility]   Pharmacy Pharmacokinetic Monitoring Service - Vancomycin    Indication:  Osteomyelitis of left foot  Target Concentration: Goal AUC/ANTHONY 400-600 mg*hr/L  Day of Therapy: 3  Additional Antimicrobials: Piperacillin/Tazobactam    Pertinent Laboratory Values:   Wt Readings from Last 1 Encounters:   12/24/24 62.6 kg (138 lb)     Temp Readings from Last 1 Encounters:   12/26/24 97.5 °F (36.4 °C) (Oral)     Estimated Creatinine Clearance: 55 mL/min (based on SCr of 0.85 mg/dL).  Recent Labs     12/25/24  0434 12/26/24  0548   CREATININE 0.88 0.85   BUN 13 14   WBC 8.3 10.3       Pertinent Cultures:  Culture Date Source Results   12/23 Blood NGTD   12/24 Wound GNR   MRSA Nasal Swab: N/A. Non-respiratory infection.    Assessment:  Date Current Dose Concentration Timing of Concentration (h) AUC   12/24 1500 mg x 1  750 mg q12h 19 4.5 -   12/25 750 mg q12h - - -   12/26 1250 mg q24h 14.6 11 440   Note: Serum concentrations collected for AUC dosing may appear elevated if collected in close proximity to the dose administered, this is not necessarily an indication of toxicity    Plan:  Continue current dose of 1250 mg q24h  No level ordered at this time  Renal labs as indicated  Pharmacy will continue to monitor patient and adjust therapy as indicated    Thank you for the consult,  Tiffanie Sauer RPH  12/26/2024 8:23 AM   
Carlos [facility]   Pharmacy Pharmacokinetic Monitoring Service - Vancomycin    Indication: Bone and Joint Infection, Skin and Soft Tissue Infection  Target Concentration: Goal AUC/ANTHONY 400-600 mg*hr/L  Day of Therapy: 5  Additional Antimicrobials: Piperacillin/Tazobactam    Pertinent Laboratory Values:   Wt Readings from Last 1 Encounters:   01/05/25 64.6 kg (142 lb 6.4 oz)     Temp Readings from Last 1 Encounters:   01/05/25 97.4 °F (36.3 °C) (Axillary)     Estimated Creatinine Clearance: 70 mL/min (based on SCr of 0.69 mg/dL).  Recent Labs     01/05/25  0343   CREATININE 0.69   BUN 19*       Pertinent Cultures:  Culture Date Source Results   12/24 Wound Heavy GNR   MRSA Nasal Swab: N/A. Non-respiratory infection.    Assessment:  Date Current Dose Concentration Timing of Concentration (h) AUC   1/1 1500 mg x 1 - - -   1/2 1250 mg q18h - - -   1/3 1,250mg q18h - - -   1/4 1250 mg q18h  1250 mg q24h 25.2 7 608   1/5 1250 mg q24h - - -   Note: Serum concentrations collected for AUC dosing may appear elevated if collected in close proximity to the dose administered, this is not necessarily an indication of toxicity    Plan:  Continue current dose of 1250 mg q24h  Anticipated AUC of 457 mg/L.hr and trough concentration of 11.4 mg/L at steady state  Vancomycin concentration ordered for 1/6 @ 0400  Renal labs ordered for tomorrow  Pharmacy will continue to monitor patient and adjust therapy as indicated    Thank you for the consult,  Tiffanie Sauer RPH  1/5/2025 11:15 AM   
[facility]  Progress Note    Patient: Stefano Begum Jr. Age: 84 y.o. : 1940 MR#: [id number] SSN: [id number]  Date/Time: 2024     Chief Complaint   Patient presents with    Wound Infection       Subjective:   Patient seen and examined. No acute events overnight. no new complaints. Denies chest pain, abdominal pain, shortness of breath, adamant for no toe/ankle amputation.  He does agree that at home is not a safe living situation for him, he reports he takes care of his wife who is legally blind and does not want to leave her behind if he were to go to skilled nursing.  Care plan discussed.  Questions answered the best of my ability.  Assessment/Plan:   Left lower extremity osteomyelitis involving 1st through 5th phalanges  Podiatry, ID following.  Continue empiric antibiotics for now, follow cultures  Popliteal artery occlusion with aneurysmal dilation  Severe PAD   -Vascular surgery input appreciated  Hypertension  -Home med  Hyperlipidemia  -Home med  History of right foot all toes amputated   History of A fib - not on OAC d/t recurrent falls , rate controlled on atenolol, continue telemetry  Hypothyroidism  -TSH elevated, however free T4 normal continue current treatment.  -Home med  BPH  -Flomax  Chronic CHF, unclear whether systolic/diastolic or both  Not in acute exacerbation, repeat TTE pending.  Depression   -Zoloft  Concern for blind wife at home- primary caregiver for wife   Severe malnutrition-RD following      Dispo plan: anticipated discharge date -, appreciate care management assistance.    I spent 50 minutes with the patient in face-to-face consultation, of which greater than 50% was spent in counseling and coordination of care as described above.    Case discussed with:  [x]Patient  []Family  [x]Nursing  [x]Case Management  DVT Prophylaxis:  []Lovenox  []Hep SQ  [x]SCDs  []Coumadin   []Eliquis/Xarelto     Objective:   VS: /68   
Carlos [facility]  Progress Note    Patient: Stefano Begum Jr. Age: 84 y.o. : 1940 MR#: [id number] SSN: [id number]  Date/Time: 2025     Subjective: Patient seen evaluated, lying in bed, no acute distress.  Patient is more awake today.  Patient is complaining of pain in his leg.  Tramadol added.  Discussed with patient about possible surgery and amputation, patient is refusing.  Psych reconsulted for capacity eval    -no new events overnight, continue current treatment plan  Appreciate psych input, patient does not have capacity eval.  Case management on board for discharge planning to long-term care  Patient continues to refuse surgery.     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  Vascular surgery spoke to Ortho Dr. Villarreal and plan for surgery tomorrow for nail removal and then amputation  I also spoke to Dr. Villarreal, he is in agreement with the plan.  I have discussed with surgeons to call son Ash Hernandez for consent.  Cardiology input noted, patient moderate cardiovascular risk for low risk surgery  Discussed with psychiatry, input noted.  Per psychiatry, patient limited ability to make decision  Will continue atenolol, statin  Continue Synthroid  Pain management  Further plan based on hospital course    -events noted.  Discussed with vascular surgeon.  Reviewed notes by the OR nurse and orthopedic surgeon and vascular 
Carlos [facility] Hospital[facility] Group  Progress Note    Patient: Stefano Begum Jr. Age: 84 y.o. : 1940 MR#: [id number] SSN: [id number]  Date/Time: 2024     Subjective: Patient lying in the bed, sleeping but easily woke up, feels fine.  No complaints.  Patient forgot about his surgery, he kept asking me multiple questions about why we are doing surgery and where his infection and what will happen if he does not do surgery.  I patiently explained to the patient again today about need for surgery and amputation due to infection in his foot and gangrene.  Patient states he cannot remember but he agrees with the amputation.     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  Vascular surgery spoke to Ortho Dr. Villarreal and plan for surgery tomorrow for nail removal and then amputation  I also spoke to Dr. Villarreal, he is in agreement with the plan.  I have discussed with surgeons to call son Ash or David for consent.  Cardiology input noted, patient moderate cardiovascular risk for low risk surgery  Discussed with psychiatry, input noted.  Per psychiatry, patient limited ability to make decision  Will continue atenolol, statin  Continue Synthroid  Pain management  Further plan based on hospital course    Discussed with patient at the bedside again today and explained in detail about need for AKA.  Had a long discussion with the patient, explained about need for 
Carlos [facility]pp[facility] Hospitalist [facility]  Progress Note    Patient: Stefano Begum Jr. Age: 84 y.o. : 1940 MR#: [id number] SSN: [id number]  Date/Time: 2024     Subjective: I personally saw and evaluated this patient face-to-face today.  Patient is sitting in bed in no apparent distress, awake, pleasantly confused.  Does not appear to be in any pain at this time     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 phalanges  Popliteal artery occlusion with aneurysmal dilation  Severe PAD   Hypertension  Hyperlipidemia  History of right foot all toes amputated   History of A fib - not on OAC d/t recurrent falls  Hypothyroidism  BPH  Chronic CHF  Depression   Concern for blind wife at home  Severe malnutrition  Mild acute metabolic encephalopathy, concern for dementia    Plan:  Will continue empiric antibiotics  Discussed with vascular surgery, patient does have nail in his left hip which needs to be removed.  Vascular surgery spoke to Ortho Dr. Villarreal and plan for surgery tomorrow for nail removal and then amputation  I also spoke to Dr. Villarreal, he is in agreement with the plan.  I have discussed with surgeons to call son Ash or David for consent.  Cardiology input noted, patient moderate cardiovascular risk for low risk surgery  Discussed with psychiatry, input noted.  Per psychiatry, patient limited ability to make decision  Will continue atenolol, statin  Continue Synthroid  Pain management  Further plan based on hospital course    -events noted.  Discussed with vascular surgeon.  Reviewed notes by the OR nurse and orthopedic surgeon and vascular surgeon.  Patient remains pleasantly confused at this time.  Concerns have been raised about who would be giving consent for the patient for the recommended surgery.  At this time patient is not septic.  Surgery has been postponed by orthopedics and vascular pending clarity.  [facility] 
Carlos Epperson [facility] Hospitalist Group  Progress Note    Patient: Stefano Begum Jr. Age: 84 y.o. : 1940 MR#: [id number] SSN: [id number]  Date/Time: 2025     Subjective: Patient seen evaluated, lying in bed, no acute distress.  Patient is more awake today.  Patient is complaining of pain in his leg.  Tramadol added.  Discussed with patient about possible surgery and amputation, patient is refusing.  Psych reconsulted for capacity eval    -no new events overnight, continue current treatment plan  Appreciate psych input, patient does not have capacity eval.  Case management on board for discharge planning to long-term care  Patient continues to refuse surgery.     no new events overnight, continue current treatment plan.  Continue IV antibiotics.    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  He continues to refuse surgery.     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  Vascular surgery spoke to Ortho Dr. Villarreal and plan for surgery tomorrow for nail removal and then amputation  I also spoke to Dr. Villarreal, he is in agreement with the plan.  I have discussed with surgeons to call son Ash or David for consent.  Cardiology input noted, patient moderate cardiovascular risk for low risk surgery  Discussed with psychiatry, input noted.  Per psychiatry, patient limited ability to make 
Carlos [facility]pp[facility] Hospitalist Group  Progress Note    Patient: Steafno Begum Jr. Age: 84 y.o. : 1940 MR#: [id number] SSN: [id number]  Date/Time: 2024     Subjective: Patient lying in the bed, alert awake and answering questions appropriately but sometimes gets lost during conversation.  Sometimes seems very confused.  Per RN on and off confused.     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 phalanges  Popliteal artery occlusion with aneurysmal dilation  Severe PAD   Hypertension  Hyperlipidemia  History of right foot all toes amputated   History of A fib - not on OAC d/t recurrent falls  Hypothyroidism  BPH  Chronic CHF  Depression   Concern for blind wife at home  Severe malnutrition  Mild acute metabolic encephalopathy, concern for dementia    Plan:  Will continue empiric antibiotics  Vascular input noted, plan for AKA next week if patient and family agrees  Cardiology input noted, patient moderate cardiovascular risk for low risk surgery  Given patient's confusion, I suspect competency level.  Will get psych for capacity evaluation.  Will continue atenolol, statin  Continue Synthroid  Pain management  Further plan based on hospital course    Discussed with patient and also with one of the son Ash over the phone and explained in detail about my above plan care.  Patient currently agreeable for amputation.  Discussed with the son about need for amputation, explained about risk and benefits.  Per son, patient never made medical power of  paperwork.  But both the son and agree with proceeding with surgery if needed.  Per Ash, they were trying to amputation and [facility] hospital but patient declined multiple times and they could not do the surgery and sent him home.  Per son, patient was deemed incompetent to make decision in [facility] hospital.  I reviewed records but could not find a psych evaluation.    Will obtain a psych evaluation for 
Carlos Epperson [facility] Hospitalist Group  Progress Note    Patient: Stefano Begum Jr. Age: 84 y.o. : 1940 MR#: [id number] SSN: [id number]  Date/Time: 2025     Subjective: I personally saw and evaluated this patient face-to-face today.  Patient is sitting in bed in no apparent distress, awake, remains pleasantly confused, follows simple commands.      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  Vascular surgery spoke to Ortho Dr. Villarreal and plan for surgery tomorrow for nail removal and then amputation  I also spoke to Dr. Villarreal, he is in agreement with the plan.  I have discussed with surgeons to call son Ash or David for consent.  Cardiology input noted, patient moderate cardiovascular risk for low risk surgery  Discussed with psychiatry, input noted.  Per psychiatry, patient limited ability to make decision  Will continue atenolol, statin  Continue Synthroid  Pain management  Further plan based on hospital course    -events noted.  Discussed with vascular surgeon.  Reviewed notes by the OR nurse and orthopedic surgeon and vascular surgeon.  Patient remains pleasantly confused at this time.  Concerns have been raised about who would be giving consent for the patient for the recommended surgery.  At this time patient is not septic.  Surgery has been postponed by orthopedics and vascular pending clarity.  Hospital  MA and hospital  
[facility]   Pharmacy Pharmacokinetic Monitoring Service - Vancomycin     Stefano Begum Jr. is a 84 y.o. male starting on vancomycin therapy for  possible osteomyelitis of left foot . Pharmacy was consulted for monitoring and adjustment.    Target Concentration: Goal AUC/ANTHONY 400-600 mg*hr/L    Additional Antimicrobials: Piperacillin/Tazobactam    Pertinent Laboratory Values:   Weight - Scale: 62.8 kg (138 lb 6.4 oz)  Recent Labs     12/23/24  1840   CREATININE 0.71   BUN 11   WBC 11.3     Estimated Creatinine Clearance: 66 mL/min (based on SCr of 0.71 mg/dL).    Pertinent Cultures:  Culture Date Source Results   12/24 Blood x 2 IP   12/24 Wound IP   MRSA Nasal Swab: N/A. Non-respiratory infection    Plan:  Dosing recommendations based on Bayesian software  Patient is receiving 1500 mg x 1 as loading dose, will follow with 750 mg q12h  Anticipated AUC of 480 and trough concentration of 15.1 at steady state  Renal labs as indicated   Vancomycin concentration ordered for  12/24 @ 0600  Pharmacy will continue to monitor patient and adjust therapy as indicated    Thank you for the consult,  MANJIT RUDD RPH  12/24/2024    
[facility]   Pharmacy Pharmacokinetic Monitoring Service - Vancomycin    Indication: Bone and Joint Infection, Skin and Soft Tissue Infection  Target Concentration: Goal AUC/ANTHONY 400-600 mg*hr/L  Day of Therapy: 1  Additional Antimicrobials: Piperacillin/Tazobactam    Pertinent Laboratory Values:   Wt Readings from Last 1 Encounters:   12/24/24 62.6 kg (138 lb)     Temp Readings from Last 1 Encounters:   01/01/25 97.6 °F (36.4 °C) (Axillary)     Estimated Creatinine Clearance: 66 mL/min (based on SCr of 0.71 mg/dL).  Recent Labs     12/30/24  1804 01/01/25  0425   CREATININE 0.68 0.71   BUN 17 18   WBC 12.6 13.7*       Pertinent Cultures:  Culture Date Source Results   12/24 Wound Heavy GNR   MRSA Nasal Swab: N/A. Non-respiratory infection.    Assessment:  Date Current Dose Concentration Timing of Concentration (h) AUC   01/01 1500 mg x 1 - - -   Note: Serum concentrations collected for AUC dosing may appear elevated if collected in close proximity to the dose administered, this is not necessarily an indication of toxicity    Plan:  Start dose of 1500 mg x 1 followed by 1250 mg q18h  Anticipated AUC of 498 mg/L.hr and trough concentration of 13.4 mg/L at steady state  No level ordered at this time  Renal labs as indicated  Pharmacy will continue to monitor patient and adjust therapy as indicated    Thank you for the consult,  Tiffanie Sauer RPH  1/1/2025 1:07 PM   
[facility]   Pharmacy Pharmacokinetic Monitoring Service - Vancomycin    Indication:  Osteo foot  Target Concentration: Goal AUC/ANTHONY 400-600 mg*hr/L  Day of Therapy: 1  Additional Antimicrobials: Piperacillin/Tazobactam    Pertinent Laboratory Values:   Temp: 97.6 °F (36.4 °C), Weight - Scale: 62.8 kg (138 lb 6.4 oz)  Recent Labs     12/23/24  1840 12/24/24  0433   CREATININE 0.71 0.70   BUN 11 11   WBC 11.3 12.4       Estimated Creatinine Clearance: 67 mL/min (based on SCr of 0.7 mg/dL).    Pertinent Cultures:  Culture Date Source Results   12/23 blood ngtd   12/24 Wound, foot pending   MRSA Nasal Swab: N/A. Non-respiratory infection    Assessment:  Date/Time Current Dose Concentration Timing of Concentration (h) AUC   12/24 1,500mg  750mg q12h 19 4.5 -     Note: Serum concentrations collected for AUC dosing may appear elevated if collected in close proximity to the dose administered, this is not necessarily an indication of toxicity    Plan:  Vancomycin 750mg q12h  Projected AUCss/T = 499/15.6  Renal labs as indicated   Vancomycin concentration to be repeated as indicated  Pharmacy will continue to monitor patient and adjust therapy as indicated    Thank you for the consult,  ELIZABETH HUMPHREYS RPH  12/24/2024    
[facility]   Pharmacy Pharmacokinetic Monitoring Service - Vancomycin    Indication:  Osteo foot  Target Concentration: Goal AUC/ANTHONY 400-600 mg*hr/L  Day of Therapy: 2  Additional Antimicrobials: Piperacillin/Tazobactam    Pertinent Laboratory Values:   Temp: 97.9 °F (36.6 °C), Weight - Scale: 62.6 kg (138 lb)  Recent Labs     12/24/24  0433 12/25/24  0434   CREATININE 0.70 0.88   BUN 11 13   WBC 12.4 8.3       Estimated Creatinine Clearance: 53 mL/min (based on SCr of 0.88 mg/dL).    Pertinent Cultures:  Culture Date Source Results   12/23 blood ngtd   12/24 Wound, foot pending   MRSA Nasal Swab: N/A. Non-respiratory infection    Assessment:  Date/Time Current Dose Concentration Timing of Concentration (h) AUC   12/24 1,500mg  750mg q12h 19 4.5 -   12/25 750mg q12h        Note: Serum concentrations collected for AUC dosing may appear elevated if collected in close proximity to the dose administered, this is not necessarily an indication of toxicity    Plan:  Adjust dose to vancomycin 1250mg q24h to start tomorrow   Projected AUCss/T = 509/13.9  Renal labs as indicated   Vancomycin level ordered for tomorrow with AM labs  Pharmacy will continue to monitor patient and adjust therapy as indicated    Thank you for the consult,  Latesha Bustillos RPH  12/25/2024    
[facility]   Pharmacy Pharmacokinetic Monitoring Service - Vancomycin    Indication: Bone and Joint Infection, Skin and Soft Tissue Infection  Target Concentration: Goal AUC/ANTHONY 400-600 mg*hr/L  Day of Therapy: 4  Additional Antimicrobials: Piperacillin/Tazobactam    Pertinent Laboratory Values:   Wt Readings from Last 1 Encounters:   01/04/25 64.6 kg (142 lb 8 oz)     Temp Readings from Last 1 Encounters:   01/04/25 97.8 °F (36.6 °C) (Oral)     Estimated Creatinine Clearance: 69 mL/min (based on SCr of 0.7 mg/dL).  Recent Labs     01/02/25  0441   CREATININE 0.70   BUN 16   WBC 13.8*       Pertinent Cultures:  Culture Date Source Results   12/24 Wound Heavy GNR   MRSA Nasal Swab: N/A. Non-respiratory infection.    Assessment:  Date Current Dose Concentration Timing of Concentration (h) AUC   01/01 1500 mg x 1 - - -   01/02 1250 mg q18h - - -   01/03 1,250mg q18h - - -   01/04 1250 mg q18h  1250 mg q24h 25.2 7 608   Note: Serum concentrations collected for AUC dosing may appear elevated if collected in close proximity to the dose administered, this is not necessarily an indication of toxicity    Plan:  Decrease dose to 1250 mg q24h  Anticipated AUC of 464 mg/L.hr and trough concentration of 12 mg/L at steady state  No level ordered at this time  Renal labs ordered for tomorrow  Pharmacy will continue to monitor patient and adjust therapy as indicated    Thank you for the consult,  Tiffanie Sauer RPH  1/4/2025 10:32 AM   
Comprehensive Nutrition Assessment    Type and Reason for Visit:  Reassess    Nutrition Recommendations/Plan:   Continue current diet as tolerated.  Continue oral supplements: Ensure Plus High Protein (each provides 350 kcal, 20g protein) TID  Continue MVI and thiamine supplementation.   Daily wts.   Continue to monitor tolerance of PO, compliance of oral supplements, weight, labs, and plan of care during admission.     Malnutrition Assessment:  Malnutrition Status:  Severe malnutrition (12/24/24 1419)    Context:  Social/Environmental Circumstances       Nutrition Assessment:    Admitted for osteomyelitis of left lower extremity. Per chart, psychiatry determined pt has little ability to make decision to have/not have surgery; family discussions resulted in pt not having surgery. Per flow sheets, meal intake 1-75%, 3/4 entries >50%. No documentation for supplement intake. Visited pt- very difficult to understand and now appropriately covered. Repeatedly asking for help cleaning bed; writer left to alert nursing staff. Continue to monitor pt per policy.    Nutrition Related Findings:    Pertinent Meds:   Pepcid  Lactobacillus  Synthroid  MVI  Zosyn  thiamine Pertinent Labs:  Recent Labs     01/05/25  0343   GLUCOSE 99   BUN 19*   CREATININE 0.69      K 3.7      CO2 33*   CALCIUM 8.6     No results for input(s): \"POCGLU\" in the last 72 hours.    Last BM: 01/04/25    Skin: Wound Type: Pressure Injury, Stage I (necrotic toes and heel to left foot)    Edema:    None   Edema Generalized: None        RLE Edema: Trace        Current Nutrition Intake & Therapies:    Average Meal Intake: 51-75%  Average Supplements Intake: Unable to assess  ADULT DIET; Regular; 3 carb choices (45 gm/meal); Low Fat/Low Chol/High Fiber/MARY; Low Sodium (2 gm)  ADULT ORAL NUTRITION SUPPLEMENT; Breakfast, Lunch, Dinner; Standard High Calorie/High Protein Oral Supplement    Anthropometric Measures:  Height: 177.8 cm (5' 10\")  Ideal Body 
Comprehensive Nutrition Assessment    Type and Reason for Visit:  Reassess    Nutrition Recommendations/Plan:   Continue current diet as tolerated.  Continue oral supplements: Ensure Plus High Protein (each provides 350 kcal, 20g protein) TID  Continue multivitamin and thiamine supplementation daily.   Daily wts.  Continue to monitor tolerance of PO, compliance of oral supplements, weight, labs, and plan of care during admission.     Malnutrition Assessment:  Malnutrition Status:  Severe malnutrition (12/24/24 1419)    Context:  Social/Environmental Circumstances     Findings of the 6 clinical characteristics of malnutrition:  Energy Intake:  50% or less estimated energy requirements for 1 month or longer  Weight Loss:  10% over 6 months     Body Fat Loss:  Severe body fat loss Triceps   Muscle Mass Loss:  Severe muscle mass loss Temples (temporalis), Clavicles (pectoralis & deltoids), Scapula (trapezius)  Fluid Accumulation:  Unable to assess     Strength:  Not Performed    Nutrition Assessment:    Admitted for osteomyelitis of left lower extremity. Per chart, pt refused surgery; ongoing discussions to further discuss medical decision making for the patients. Diet advanced 12/31. One entry for meal intake since diet advanced, 26-50%. Ensure Plus High Protein added TID; no documentation for supplement intake. Attempted to visit pt- asleep and not fully covered by gown/covers. Will continue to monitor intake/tolerance of meals/supplements.    Nutrition Related Findings:    Pertinent Meds:   Synthroid  Pepcid  MVI  Zosyn  thiamine Pertinent Labs:  Recent Labs     01/01/25  0425 01/02/25  0441   GLUCOSE 108* 108*   BUN 18 16   CREATININE 0.71 0.70    141   K 3.7 3.6    106   CO2 31 31   CALCIUM 8.9 8.8   MG 2.0  --      No results for input(s): \"POCGLU\" in the last 72 hours.    Last BM: 01/02/25    Skin: Wound Type: Pressure Injury, Stage I (necrotic toes and heel to left foot)    Edema:    None   Edema 
Found patient with iv in hand and iv site leaking with blood. When I ask the patient what happened \"he stated he did not know\". This nurse and every nurse on the unit plus icu attempted an iv and were unsuccessful. The  Was notified and an order for an ultrasound guided piv was placed. Nurse supervisor made aware.   
Infectious Disease progress Note        Reason: left foot infection/necrosis    Current abx Prior abx   Pip/tazo  since 12/23/24   Vancomycin  Vanc 1/1-1/6     Lines:       Assessment :  84 y.o. male with a PMHx of cellulitis, hypertension, right foot toes amputated, severe PAD, hyperlipidemia, A-fib not on oral anticoagulation due to recurrent falls, hypothyroidism, BPH, chronic CHF, depression who presented to the ED on 12/23/24 with complaints of left foot infection.      Clinical presentation c/w chronic osteomyelitis left first through fifth phalanges, left fifth MTP joint in setting of several peripheral arterial disease, left foot dry gangrene    Wound cx 12/24-mixed gnr  Blood culture 12/23- no growth    + foul smell left foot suggestive of superimposed infection     Podiatry/vascular surgery evaluation appreciated.  Patient wishes to have debridement of necrotic tissue but not higher level of amputation    No plans for surgery per family's decision.     Recommendations:    D/c pip/tazo. Start po ciprofloxacin, augmentin till 1/24/25  Follow-up Ortho surgery, vascular surgery recommendations regarding left AKA  Please note that antibiotics will not cure his infection in absence of surgical intervention. High risk of wet necrosis left foot, sepsis, death due to persistent necrotic left foot.     Above plan was discussed in details with dr. Marcelo. Please call me if any further questions or concerns. Will continue to participate in the care of this patient.     HPI:  No new complaints    Past Medical History:   Diagnosis Date    BPH (benign prostatic hyperplasia) 12/24/2024    Chronic CHF (congestive heart failure) (HCC) 12/24/2024    Depression     History of atrial fibrillation 12/24/2024    Hyperlipidemia     Hypertension     Hypothyroidism 12/24/2024    Peripheral arterial disease (HCC) 12/24/2024    Peripheral arterial disease (HCC)        Past Surgical History:   Procedure Laterality Date    FOOT 
Infectious Disease progress Note        Reason: left foot infection/necrosis    Current abx Prior abx   Pip/tazo  since 12/23/24   Vancomycin  Vanc 1/1-1/6     Lines:       Assessment :  84 y.o. male with a PMHx of cellulitis, hypertension, right foot toes amputated, severe PAD, hyperlipidemia, A-fib not on oral anticoagulation due to recurrent falls, hypothyroidism, BPH, chronic CHF, depression who presented to the ED on 12/23/24 with complaints of left foot infection.      Clinical presentation c/w chronic osteomyelitis left first through fifth phalanges, left fifth MTP joint in setting of several peripheral arterial disease, left foot dry gangrene    Wound cx 12/24-mixed gnr  Blood culture 12/23- no growth    + foul smell left foot suggestive of superimposed infection     Podiatry/vascular surgery evaluation appreciated.  Patient wishes to have debridement of necrotic tissue but not higher level of amputation    Planned AKA 1/1 postponed waiting for consent from family member    Patient now agreeable to having left AKA    Recommendations:    Continue pip/tazo.  Follow-up Ortho surgery, vascular surgery recommendations regarding left AKA  Will discontinue antibiotics 24 hours postop    Above plan was discussed in details with dr. Marcelo. Please call me if any further questions or concerns. Will continue to participate in the care of this patient.         HPI:  \" I want to talk to the surgeon.  I want to have the surgery\"  Past Medical History:   Diagnosis Date    BPH (benign prostatic hyperplasia) 12/24/2024    Chronic CHF (congestive heart failure) (HCC) 12/24/2024    Depression     History of atrial fibrillation 12/24/2024    Hyperlipidemia     Hypertension     Hypothyroidism 12/24/2024    Peripheral arterial disease (HCC) 12/24/2024    Peripheral arterial disease (HCC)        Past Surgical History:   Procedure Laterality Date    FOOT AMPUTATION THROUGH METATARSAL Left     Left foot all toes amputated 
Infectious Disease progress Note        Reason: left foot infection/necrosis    Current abx Prior abx   Pip/tazo  since 12/23/24  Vanc 1/1 vancomycin     Lines:       Assessment :  84 y.o. male with a PMHx of cellulitis, hypertension, right foot toes amputated, severe PAD, hyperlipidemia, A-fib not on oral anticoagulation due to recurrent falls, hypothyroidism, BPH, chronic CHF, depression who presented to the ED on 12/23/24 with complaints of left foot infection.      Clinical presentation c/w chronic osteomyelitis left first through fifth phalanges, left fifth MTP joint in setting of several peripheral arterial disease, left foot dry gangrene    Wound cx 12/24-mixed gnr  Blood culture 12/23- no growth    + foul smell left foot suggestive of superimposed infection     Podiatry/vascular surgery evaluation appreciated.  Patient wishes to have debridement of necrotic tissue but not higher level of amputation    Planned AKA 1/1 postponed waiting for consent from family member    Clinically stable    Recommendations:    Continue pip/tazo. D/c  vanc  Amputation when possible - not currently septic  Recommend to discuss goals of care with family- ? Transtion to comfort measures if patient/family continue to decline surgery    Above plan was discussed in details with dr. Marcelo. Please call me if any further questions or concerns. Will continue to participate in the care of this patient.         HPI:  No new complaints.   Past Medical History:   Diagnosis Date    BPH (benign prostatic hyperplasia) 12/24/2024    Chronic CHF (congestive heart failure) (HCC) 12/24/2024    Depression     History of atrial fibrillation 12/24/2024    Hyperlipidemia     Hypertension     Hypothyroidism 12/24/2024    Peripheral arterial disease (HCC) 12/24/2024    Peripheral arterial disease (HCC)        Past Surgical History:   Procedure Laterality Date    FOOT AMPUTATION THROUGH METATARSAL Left     Left foot all toes amputated 
Infectious Disease progress Note        Reason: left foot infection/necrosis    Current abx Prior abx   Pip/tazo  since 12/23/24 vancomycin     Lines:       Assessment :  84 y.o. male with a PMHx of cellulitis, hypertension, right foot toes amputated, severe PAD, hyperlipidemia, A-fib not on oral anticoagulation due to recurrent falls, hypothyroidism, BPH, chronic CHF, depression who presented to the ED on 12/23/24 with complaints of left foot infection.      Clinical presentation c/w chronic osteomyelitis left first through fifth phalanges, left fifth MTP joint in setting of several peripheral arterial disease, left foot dry gangrene    Wound cx 12/24-mixed gnr  Blood culture 12/23- no growth    + foul smell left foot suggestive of superimposed infection     Podiatry/vascular surgery evaluation appreciated.  Patient wishes to have debridement of necrotic tissue but not higher level of amputation    Planned AKA tomorrow, postponed waiting for consent from family member    Recommendations:    Continue pip/tazo  Follow up podiatry recommendations.   Plan to continue antibiotics 72 hours post amputation        Above plan was discussed in details with patientr.  Please call me if any further questions or concerns. Will continue to participate in the care of this patient.    HPI:  No new complaints.   Past Medical History:   Diagnosis Date    BPH (benign prostatic hyperplasia) 12/24/2024    Chronic CHF (congestive heart failure) (HCC) 12/24/2024    Depression     History of atrial fibrillation 12/24/2024    Hyperlipidemia     Hypertension     Hypothyroidism 12/24/2024    Peripheral arterial disease (HCC) 12/24/2024    Peripheral arterial disease (HCC)        Past Surgical History:   Procedure Laterality Date    FOOT AMPUTATION THROUGH METATARSAL Left     Left foot all toes amputated       @[facility]@    Current Facility-Administered Medications   Medication Dose Route Frequency    multivitamin 1 tablet  1 tablet Oral 
Infectious Disease progress Note        Reason: left foot infection/necrosis    Current abx Prior abx   Pip/tazo  since 12/23/24 vancomycin     Lines:       Assessment :  84 y.o. male with a PMHx of cellulitis, hypertension, right foot toes amputated, severe PAD, hyperlipidemia, A-fib not on oral anticoagulation due to recurrent falls, hypothyroidism, BPH, chronic CHF, depression who presented to the ED on 12/23/24 with complaints of left foot infection.      Clinical presentation c/w chronic osteomyelitis left first through fifth phalanges, left fifth MTP joint in setting of several peripheral arterial disease, left foot dry gangrene    Wound cx 12/24-mixed gnr  Blood culture 12/23- no growth    + foul smell left foot suggestive of superimposed infection     Podiatry/vascular surgery evaluation appreciated.  Patient wishes to have debridement of necrotic tissue but not higher level of amputation    Planned AKA tomorrow.      Recommendations:    Continue pip/tazo  Follow up podiatry recommendations.   Plan to continue antibiotics 72 hours post amputation        Above plan was discussed in details with patient, and dr Mc.  Please call me if any further questions or concerns. Will continue to participate in the care of this patient.    HPI:  No new complaints.   Past Medical History:   Diagnosis Date    BPH (benign prostatic hyperplasia) 12/24/2024    Chronic CHF (congestive heart failure) (HCC) 12/24/2024    Depression     History of atrial fibrillation 12/24/2024    Hyperlipidemia     Hypertension     Hypothyroidism 12/24/2024    Peripheral arterial disease (HCC) 12/24/2024    Peripheral arterial disease (HCC)        Past Surgical History:   Procedure Laterality Date    FOOT AMPUTATION THROUGH METATARSAL Left     Left foot all toes amputated       [unfilled]    Current Facility-Administered Medications   Medication Dose Route Frequency    multivitamin 1 tablet  1 tablet Oral Daily    thiamine mononitrate 
Patient was admitted from ED, alert and oriented x4, Patient was oriented to the room and patient verbalised understanding. Call bell and bed side table within reach.  
Patient was met lying in bed with NG tube in place connected to intermittent suction. Ng tube was seen coming out of the mouth, removed and repassed, placement confirmed.  0800: NG tube replaced, met NG tube lying on the floor, waiting to cofirm placement.  
Pt report [facility] to [facility]. Transport scheduled for 1515.   
Spoke with patient over the phone along with Trixie Mason RN. Discuss risks of not getting surgery on his left foot. Patient is adamant he does not want surgery. Discussed risk of death due to systemic infection if foot not addressed. 'I don't care!' Patient receptive to further discussions tomorrow morning. Palliative will revisit conversation tomorrow and likely include patient's wife by telephone.  
The patient was initially scheduled to undergo  left above-knee amputation today, with Dr. Ronald Rivera's-orthopedics (for removal of left femur intramedullary mia )and myself (for left above-knee amputation).  However there was concern regarding consent for the procedure.  The patient is intermittently confused, and the documented next of kin for medical decision making is his wife Dinorah Begum who in [facility] nursing home ().  As there were documentations indicating that the patient and his wife did not want their children involved in the medical decision making, consent for the procedure could not be obtained from the patient's son Ash Begum.      Upon discussing with the patient's son Ash, over the phone, it was noted that the patient was hospitalized at [facility] in  10/24, for abdominal aortic aneurysm and PAD-for which he had a left lower extremity arteriogram and EVAR.  The patient was advised left above-knee amputation by the vascular team at [facility] for progressive gangrene of the left foot.  As the patient was confused at the time, the patient's wife was contacted for operative consent-but she refused the procedure.  Apparently the legal team at [facility] was involved, trying to sort out the POA for the patient, by which time the patient became more lucid, and he then refused surgery.  He was apparently scheduled for left leg amputation 3 times at [facility], and finally discharged home.  According to Ash, his mother in not of sound mentation and has been so for the past few years, although there are no documented psychiatric evaluations.  Although Ash had filled out the papers for obtaining power of  for medical decision making for his parents, the papers were not notarized by the patient, and hence are not valid.    The patient has not been eating or drinking well for the past 1 month, and was unable to take care of himself.  He and his wife were evaluated EPS 
Vascular Surgery Progress Note    Admit Date: 12/23/2024  POD * No surgery found *      Recalled by hospitalist, to evaluate the patient for left above-knee amputation.  The patient is apparently agreeable to proceeding with surgery   ( Left AKA ) now.  Palliative care notes, and [facility].  The patient was hospitalized at [facility] earlier this month, with wet gangrene of the left foot and was discharged on 12/18/2024, as he refused further interventions.  He was evaluated by vascular team at [facility], and was scheduled for left AKA on 12/16/2024.  However the patient was discharged as he refused the surgery.      As per the palliative care note yesterday, the patient was still not agreeable for left lower extremity amputation, and was only consenting for debridement of the left foot.    The patient was evaluated by podiatry Dr. Ott, who also agrees that the tissue loss is significant, and that the patient should have a left  lower extremity amputation.    I talked to the patient today  again.  He is again very reluctant to undergo left above-knee amputation.  He just wants  debridement of the left foot/toes.    He also has significant other medical co morbidities.  Echocardiogram during the current hospitalization revealed 55 to 60% ejection fraction.  He is physically very deconditioned, frail and is at increased risk for perioperative complications, irrespective of his medical comorbidities..    Discussed with hospitalist-Dr. Mc    Medical team to discuss with patient's family, and confirm that the the patient and his family are agreeable for the surgery or not.    Suggest cardiology evaluation for risk stratification, if the patient agrees to undergo left above-knee amputation.    Will schedule the patient for left AKA early next week, if he agrees to undergo the procedure.  
Vascular Surgery Progress Note    Admit Date: 2024  POD * No surgery date entered *    Procedure:  Procedure(s):  LEFT LEG AMPUTATION ABOVE KNEE  Removal intramedullary mia LEFT FEMUR; C-ARM; [DEPUY SYNTHES ORTHO]; NEED HANAOR FRACTURE TABLE      Subjective:     Patient has no new complaints.  He is confused-he did not seem to know who Ash was  - when I said I talked to his son Ash    Objective:     Blood pressure (!) 142/77, pulse 73, temperature 98.7 °F (37.1 °C), temperature source Oral, resp. rate 18, height 1.778 m (5' 10\"), weight 62.6 kg (138 lb), SpO2 94%.    Temp (24hrs), Av.3 °F (36.8 °C), Min:98.1 °F (36.7 °C), Max:98.7 °F (37.1 °C)        Labs: Results:       Chemistry No results for input(s): \"GLU\", \"NA\", \"K\", \"CL\", \"CO2\", \"BUN\", \"TP\", \"GLOB\" in the last 72 hours.    Invalid input(s): \"CREA\", \"CA\", \"AGAP\", \"BUCR\", \"TBIL\", \"GPT\", \"AP\", \"ALB\", \"AGRAT\"   CBC w/Diff No results for input(s): \"WBC\", \"RBC\", \"HGB\", \"HCT\", \"PLT\" in the last 72 hours.    Invalid input(s): \"GRANS\", \"LYMPH\", \"EOS\"   Microbiology Invalid input(s): \"CULT\"   Coagulation No results for input(s): \"INR\", \"APTT\" in the last 72 hours.    Invalid input(s): \"PTP\"      Data Review: images and reports reviewed    Assessment:     PAD, extensive gangrene of the left forefoot, thrombosed left popliteal artery aneurysm, with left femur intramedullary mia    Plan/Recommendations/Medical Decision Making:     Discussed with orthopedic surgeon Dr. Ronald Villarreal -  Appreciate Ortho evaluation and suggestions.    Plan for left above-knee amputation tomorrow-2024, after removal of intramedullary mia from the left femur by Dr Villarreal      
Vascular Surgery Progress Note    Admit Date: 2024  POD * No surgery date entered *    Stefano Begum Jr. is a 84 y.o. male with a PMHx peripheral arterial disease, tissue loss of the feet, previous right lower extremity arterial bypass x 2, right TMA, abdominal aortic aneurysm-status post EVAR and right hypogastric artery coil ligation, hypertension, hyperlipidemia, A-fib not on oral anticoagulation due to recurrent falls, hypothyroidism, BPH, chronic CHF, depression, who was hospitalized with extensive gangrene of the right forefoot.    The patient was previously treated at [facility]-including surgeries by vascular and orthopedic surgeons.    Timeline of surgical interventions concerning PAD  2019: Redo right superficial femoral artery to peroneal artery vein bypass using bilateral arm veins by Dr. Barr  2019: Right TMA   10/23/2024: Left lower extremity angiogram through right femoral artery access-Dr. Lakhani  10/28/2024: Right hypogastric artery collateralization through left femoral artery approach- Dr. Lakhani.  10/31/2024: EVAR-32 [employer]: Dr. Lakhani-for 5.8 cm AAA.    History of left hip fracture requiring a mia and screws  History of left knee osteoarthritis, with mild flexion contracture of the left knee    History of failure to thrive, with markedly decreased eating and drinking, for almost a month prior to hospitalization.  History of waxing  and waning mentation, during current hospitalization, with increasing confusion.    Blood pressure 129/68, pulse 61, temperature 98.3 °F (36.8 °C), temperature source Oral, resp. rate 18, height 1.778 m (5' 10\"), weight 62.6 kg (138 lb), SpO2 96%.    Temp (24hrs), Av.9 °F (36.6 °C), Min:97.3 °F (36.3 °C), Max:98.6 °F (37 °C)      Labs: Results:       Chemistry Recent Labs     24  0448      K 3.7      CO2 28   BUN 14      CBC w/Diff Recent Labs     24  0448   WBC 11.4   RBC 4.02*   HGB 11.2*   HCT 36.2   * 
  Procedure Laterality Date    FOOT AMPUTATION THROUGH METATARSAL Left     Left foot all toes amputated       [unfilled]    Current Facility-Administered Medications   Medication Dose Route Frequency    enoxaparin (LOVENOX) injection 40 mg  40 mg SubCUTAneous Daily    vancomycin (VANCOCIN) 1250 mg in 250 mL IVPB  1,250 mg IntraVENous Q24H    sodium chloride flush 0.9 % injection 5-40 mL  5-40 mL IntraVENous 2 times per day    sodium chloride flush 0.9 % injection 5-40 mL  5-40 mL IntraVENous PRN    potassium chloride (KLOR-CON M) extended release tablet 40 mEq  40 mEq Oral PRN    Or    potassium bicarb-citric acid (EFFER-K) effervescent tablet 40 mEq  40 mEq Oral PRN    Or    potassium chloride 10 mEq/100 mL IVPB (Peripheral Line)  10 mEq IntraVENous PRN    magnesium sulfate 2000 mg in 50 mL IVPB premix  2,000 mg IntraVENous PRN    ondansetron (ZOFRAN-ODT) disintegrating tablet 4 mg  4 mg Oral Q8H PRN    Or    ondansetron (ZOFRAN) injection 4 mg  4 mg IntraVENous Q6H PRN    polyethylene glycol (GLYCOLAX) packet 17 g  17 g Oral Daily PRN    famotidine (PEPCID) tablet 20 mg  20 mg Oral BID    piperacillin-tazobactam (ZOSYN) 3,375 mg in sodium chloride 0.9 % 50 mL IVPB (mini-bag)  3,375 mg IntraVENous Q8H    atenolol (TENORMIN) tablet 50 mg  50 mg Oral Daily    levothyroxine (SYNTHROID) tablet 50 mcg  50 mcg Oral Daily    rosuvastatin (CRESTOR) tablet 40 mg  40 mg Oral QPM    sertraline (ZOLOFT) tablet 50 mg  50 mg Oral Daily    traZODone (DESYREL) tablet 50 mg  50 mg Oral Nightly    acetaminophen (TYLENOL) tablet 650 mg  650 mg Oral Q6H PRN    Or    acetaminophen (TYLENOL) suppository 650 mg  650 mg Rectal Q6H PRN       Allergies: Patient has no known allergies.    History reviewed. No pertinent family history.  Social History     Socioeconomic History    Marital status:      Spouse name: Not on file    Number of children: Not on file    Years of education: Not on file    Highest education level: Not on 
(36.6 °C), Min:97.3 °F (36.3 °C), Max:98.6 °F (37 °C)  IOBRIEF  Intake/Output Summary (Last 24 hours) at 12/29/2024 1547  Last data filed at 12/29/2024 0430  Gross per 24 hour   Intake 300.19 ml   Output 1000 ml   Net -699.81 ml       General:  Alert, cooperative, no acute distress    Pulmonary:  CTA Bilaterally. No Wheezing/Rales.  Cardiovascular: Regular rate and Rhythm.  GI:  Soft, Non distended, Non tender. + Bowel sounds.  Extremities: Left foot dry gangrene up to mid feet, some erythema and redness around the ankle and hindfoot  Psych: Not anxious or agitated.  Neurologic: Alert and oriented X 2. Moves all ext.  Additional:    Medications:   Current Facility-Administered Medications   Medication Dose Route Frequency    multivitamin 1 tablet  1 tablet Oral Daily    thiamine mononitrate tablet 100 mg  100 mg Oral Daily    enoxaparin (LOVENOX) injection 40 mg  40 mg SubCUTAneous Daily    sodium chloride flush 0.9 % injection 5-40 mL  5-40 mL IntraVENous 2 times per day    sodium chloride flush 0.9 % injection 5-40 mL  5-40 mL IntraVENous PRN    potassium chloride (KLOR-CON M) extended release tablet 40 mEq  40 mEq Oral PRN    Or    potassium bicarb-citric acid (EFFER-K) effervescent tablet 40 mEq  40 mEq Oral PRN    Or    potassium chloride 10 mEq/100 mL IVPB (Peripheral Line)  10 mEq IntraVENous PRN    magnesium sulfate 2000 mg in 50 mL IVPB premix  2,000 mg IntraVENous PRN    ondansetron (ZOFRAN-ODT) disintegrating tablet 4 mg  4 mg Oral Q8H PRN    Or    ondansetron (ZOFRAN) injection 4 mg  4 mg IntraVENous Q6H PRN    polyethylene glycol (GLYCOLAX) packet 17 g  17 g Oral Daily PRN    famotidine (PEPCID) tablet 20 mg  20 mg Oral BID    piperacillin-tazobactam (ZOSYN) 3,375 mg in sodium chloride 0.9 % 50 mL IVPB (mini-bag)  3,375 mg IntraVENous Q8H    atenolol (TENORMIN) tablet 50 mg  50 mg Oral Daily    levothyroxine (SYNTHROID) tablet 50 mcg  50 mcg Oral Daily    rosuvastatin (CRESTOR) tablet 40 mg  40 mg Oral 
AMPUTATION ABOVE KNEE  Removal intramedullary mia LEFT FEMUR; C-ARM; [DEPUY SYNTHES ORTHO]; NEED HANAOR FRACTURE TABLE :     1. Infectious disease following        2. Continue PT/OT as tolerated                                      3. Tramadol for pain control  4. lovenox for DVT prophylaxis    DISCHARGE PLANNING     Intervention : Skilled Nursing Facility (SNF)        Jeannette Ng, PAULP-C  [facility]    
BMI 19.80 kg/m²    Tmax/24hrs: Temp (24hrs), Av.9 °F (36.6 °C), Min:97.5 °F (36.4 °C), Max:98.2 °F (36.8 °C)  IOBRIEF  Intake/Output Summary (Last 24 hours) at 2024 1131  Last data filed at 2024 0415  Gross per 24 hour   Intake 888 ml   Output 1100 ml   Net -212 ml     General:  Alert, cooperative, no acute distress    HEENT: PERRLA, anicteric sclerae.  Pulmonary:  CTA Bilaterally. No Wheezing/Rales.  Cardiovascular: Regular rate and Rhythm.  GI:  Soft, Non distended, Non tender. + Bowel sounds.  Extremities:  No edema. No calf tenderness.   Psych: Good insight. Not anxious or agitated.  Neurologic: Alert and oriented X 3. Moves all ext.  Medications:   Current Facility-Administered Medications   Medication Dose Route Frequency    sodium chloride flush 0.9 % injection 5-40 mL  5-40 mL IntraVENous 2 times per day    sodium chloride flush 0.9 % injection 5-40 mL  5-40 mL IntraVENous PRN    potassium chloride (KLOR-CON M) extended release tablet 40 mEq  40 mEq Oral PRN    Or    potassium bicarb-citric acid (EFFER-K) effervescent tablet 40 mEq  40 mEq Oral PRN    Or    potassium chloride 10 mEq/100 mL IVPB (Peripheral Line)  10 mEq IntraVENous PRN    magnesium sulfate 2000 mg in 50 mL IVPB premix  2,000 mg IntraVENous PRN    ondansetron (ZOFRAN-ODT) disintegrating tablet 4 mg  4 mg Oral Q8H PRN    Or    ondansetron (ZOFRAN) injection 4 mg  4 mg IntraVENous Q6H PRN    polyethylene glycol (GLYCOLAX) packet 17 g  17 g Oral Daily PRN    famotidine (PEPCID) tablet 20 mg  20 mg Oral BID    vancomycin (VANCOCIN) 750 mg in sodium chloride 0.9 % 250 mL IVPB ([id number])  750 mg IntraVENous Q12H    piperacillin-tazobactam (ZOSYN) 3,375 mg in sodium chloride 0.9 % 50 mL IVPB (mini-bag)  3,375 mg IntraVENous Q8H    atenolol (TENORMIN) tablet 50 mg  50 mg Oral Daily    levothyroxine (SYNTHROID) tablet 50 mcg  50 mcg Oral Daily    rosuvastatin (CRESTOR) tablet 40 mg  40 mg Oral QPM    sertraline (ZOLOFT) tablet 50 mg  
History:   Diagnosis Date    BPH (benign prostatic hyperplasia) 12/24/2024    Chronic CHF (congestive heart failure) (HCC) 12/24/2024    Depression     History of atrial fibrillation 12/24/2024    Hyperlipidemia     Hypertension     Hypothyroidism 12/24/2024    Peripheral arterial disease (HCC) 12/24/2024    Peripheral arterial disease (HCC)        Past Surgical History:   Procedure Laterality Date    FOOT AMPUTATION THROUGH METATARSAL Left     Left foot all toes amputated       [unfilled]    Current Facility-Administered Medications   Medication Dose Route Frequency    enoxaparin (LOVENOX) injection 40 mg  40 mg SubCUTAneous Daily    sodium chloride flush 0.9 % injection 5-40 mL  5-40 mL IntraVENous 2 times per day    sodium chloride flush 0.9 % injection 5-40 mL  5-40 mL IntraVENous PRN    potassium chloride (KLOR-CON M) extended release tablet 40 mEq  40 mEq Oral PRN    Or    potassium bicarb-citric acid (EFFER-K) effervescent tablet 40 mEq  40 mEq Oral PRN    Or    potassium chloride 10 mEq/100 mL IVPB (Peripheral Line)  10 mEq IntraVENous PRN    magnesium sulfate 2000 mg in 50 mL IVPB premix  2,000 mg IntraVENous PRN    ondansetron (ZOFRAN-ODT) disintegrating tablet 4 mg  4 mg Oral Q8H PRN    Or    ondansetron (ZOFRAN) injection 4 mg  4 mg IntraVENous Q6H PRN    polyethylene glycol (GLYCOLAX) packet 17 g  17 g Oral Daily PRN    famotidine (PEPCID) tablet 20 mg  20 mg Oral BID    vancomycin (VANCOCIN) 750 mg in sodium chloride 0.9 % 250 mL IVPB ([id number])  750 mg IntraVENous Q12H    piperacillin-tazobactam (ZOSYN) 3,375 mg in sodium chloride 0.9 % 50 mL IVPB (mini-bag)  3,375 mg IntraVENous Q8H    atenolol (TENORMIN) tablet 50 mg  50 mg Oral Daily    levothyroxine (SYNTHROID) tablet 50 mcg  50 mcg Oral Daily    rosuvastatin (CRESTOR) tablet 40 mg  40 mg Oral QPM    sertraline (ZOLOFT) tablet 50 mg  50 mg Oral Daily    traZODone (DESYREL) tablet 50 mg  50 mg Oral Nightly    acetaminophen (TYLENOL) tablet 650 
by orthopedics and vascular pending clarity.  Hospital  MA and hospital  have already been contacted.  I tried to call the patient's wife at phone #[phone] and left a brief message.  Continue Zosyn.  I discussed with vascular surgeon.    25-patient has a slightly elevated white count today.  I discussed with ID and have added vancomycin.  I discussed with vascular surgery.  I called and discussed with the hospital  and she advised to continue to make efforts to contact patient's wife.  I tried to call patients wife again today at phone #[phone] and left a message with a callback number.  Subsequently I reached out to [facility] at phone #[phone] and spoke to the  and requested to talk to the Dinorah Begum who apparently is currently there.  She stated that they do not have a phones in the room.  I left the callback number for the hospitalist office and requested a call back from Dinorah Begum.    -ID note reviewed, continue IV antibiotics, amputation when possible.  Continue current treatment plan, will follow    1/3-patient seen evaluated, lying in bed, no acute distress.  Patient is more alert and awake today.  Patient is refusing amputation.  Psych reconsulted to determine patient's capacity.  Case management on board and recommended to start authorization for skilled nursing facility.      Dispo plan: SNF once medically stable, anticipated discharge is unclear at this time as the timing of surgery is not clear        Case discussed with:  [x]Patient  []Family  [x]Nursing  []Case Management  DVT Prophylaxis:  [x]Lovenox  []Hep SQ  []SCDs  []Coumadin   []Eliquis/Xarelto     Objective:   VS: /74   Pulse 72   Temp 97.4 °F (36.3 °C) (Oral)   Resp 16   Ht 1.778 m (5' 10\")   Wt 63 kg (139 lb)   SpO2 92%   BMI 19.94 kg/m²    Tmax/24hrs: Temp (24hrs), Av.3 °F (31.8 °C), Min:32 °F (0 °C), Max:97.7 °F (36.5 °C)  IOBRIEF  Intake/Output 
have discussed with surgeons to call son Ash or David for consent.  Cardiology input noted, patient moderate cardiovascular risk for low risk surgery  Discussed with psychiatry, input noted.  Per psychiatry, patient limited ability to make decision  Will continue atenolol, statin  Continue Synthroid  Pain management  Further plan based on hospital course    12/31-events noted.  Discussed with vascular surgeon.  Reviewed notes by the OR nurse and orthopedic surgeon and vascular surgeon.  Patient remains pleasantly confused at this time.  Concerns have been raised about who would be giving consent for the patient for the recommended surgery.  At this time patient is not septic.  Surgery has been postponed by orthopedics and vascular pending clarity.  [facility] MA and hospital  have already been contacted.  I tried to call the patient's wife at phone #[phone] and left a brief message.  Continue Zosyn.  I discussed with vascular surgeon.    1/1/25-patient has a slightly elevated white count today.  I discussed with ID and have added vancomycin.  I discussed with vascular surgery.  I called and discussed with the hospital  and she advised to continue to make efforts to contact patient's wife.  I tried to call patients wife again today at phone #[phone] and left a message with a callback number.  Subsequently I reached out to [facility] at phone #[phone] and spoke to the  and requested to talk to the Dinorah Begum who apparently is currently there.  She stated that they do not have a phones in the room.  I left the callback number for the hospitalist office and requested a call back from Dinorah Begum.    1/2-ID note reviewed, continue IV antibiotics, amputation when possible.  Continue current treatment plan, will follow    1/3-patient seen evaluated, lying in bed, no acute distress.  Patient is more alert and awake today.  Patient is refusing 
Oral QPM    sertraline (ZOLOFT) tablet 50 mg  50 mg Oral Daily    traZODone (DESYREL) tablet 50 mg  50 mg Oral Nightly    acetaminophen (TYLENOL) tablet 650 mg  650 mg Oral Q6H PRN    Or    acetaminophen (TYLENOL) suppository 650 mg  650 mg Rectal Q6H PRN       Labs:    Recent Results (from the past 24 hour(s))   Basic Metabolic Panel    Collection Time: 12/26/24  5:48 AM   Result Value Ref Range    Sodium 140 136 - 145 mmol/L    Potassium 4.2 3.5 - 5.5 mmol/L    Chloride 109 100 - 111 mmol/L    CO2 28 21 - 32 mmol/L    Anion Gap 3 3.0 - 18 mmol/L    Glucose 91 74 - 99 mg/dL    BUN 14 7.0 - 18 MG/DL    Creatinine 0.85 0.6 - 1.3 MG/DL    BUN/Creatinine Ratio 16 12 - 20      Est, Glom Filt Rate 85 >60 ml/min/1.73m2    Calcium 8.6 8.5 - 10.1 MG/DL   CBC with Auto Differential    Collection Time: 12/26/24  5:48 AM   Result Value Ref Range    WBC 10.3 4.6 - 13.2 K/uL    RBC 3.97 (L) 4.35 - 5.65 M/uL    Hemoglobin 11.0 (L) 13.0 - 16.0 g/dL    Hematocrit 36.2 36.0 - 48.0 %    MCV 91.2 78.0 - 100.0 FL    MCH 27.7 24.0 - 34.0 PG    MCHC 30.4 (L) 31.0 - 37.0 g/dL    RDW 17.1 (H) 11.6 - 14.5 %    Platelets 99 (L) 135 - 420 K/uL    MPV 11.1 9.2 - 11.8 FL    Nucleated RBCs 0.0 0  WBC    nRBC 0.00 0.00 - 0.01 K/uL    Neutrophils % 80 (H) 40 - 73 %    Lymphocytes % 9 (L) 21 - 52 %    Monocytes % 10 3 - 10 %    Eosinophils % 0 0 - 5 %    Basophils % 0 0 - 2 %    Immature Granulocytes % 1 (H) 0.0 - 0.5 %    Neutrophils Absolute 8.2 (H) 1.8 - 8.0 K/UL    Lymphocytes Absolute 1.0 0.9 - 3.6 K/UL    Monocytes Absolute 1.0 0.05 - 1.2 K/UL    Eosinophils Absolute 0.0 0.0 - 0.4 K/UL    Basophils Absolute 0.0 0.0 - 0.1 K/UL    Immature Granulocytes Absolute 0.1 (H) 0.00 - 0.04 K/UL    Differential Type AUTOMATED     Vancomycin Level, Random    Collection Time: 12/26/24  5:48 AM   Result Value Ref Range    Vancomycin Rm 14.6 5.0 - 40.0 UG/ML     Signed By: Enrique Chen,      December 26, 2024      Disclaimer: Sections of this 
Time Behavior, Nutrition Focused Physical Findings, Weight    Discharge Planning:    Continue current diet     Elin Sullivan, BRIANA  Contact: [phone]  Available via [place]  
event of cardiopulmonary arrest. Discussed the benefits and burdens of intubation in the event of respiratory decline pre-arrest. The patient was clear that he did not want CPR if his heart or breathing were to stop.  PLAN: Patient is DNR/DNI. A DDNR was completed. Patient declined to complete an AMD. Spouse is legal NOK for medical decisions if patient is unable to due to loss of capacity.    Initial consult note routed to primary continuity provider and/or primary health care team members  Please call with any palliative questions or concerns.  Palliative Care Team is available via perfect serve or via phone.    Referrals to: No referrals made today    [] Outpatient Palliative Care  [] Home Based Palliative Care  [] Home Based Primary Care  [] Hospice       ADVANCE CARE PLANNING:   [] [facility] Interdisciplinary Team has updated the ACP Navigator with Health Care Decision Maker and Patient Capacity      Primary Decision Maker: ShyDinorah - Spouse - [phone]  Confirm Advance Directive: Yes, on file    Current Code Status: DNR     Please refer to Palliative Medicine ACP notes for further details.    PALLIATIVE ASSESSMENT:      Palliative Performance Scale (PPS):  PPS: 30  Modified ESAS:  Modified-[place] Symptom Assessment Scale (ESAS)  Pain Score: No pain  Dyspnea Score: No shortness of breath    Clinical Pain Assessment (nonverbal scale for severity on nonverbal patients):   Clinical Pain Assessment  Severity: 0       RDOS:  RDOS  Heart rate per minute: less than 90  Respiratory Rate per Minute: less than 19  Restlessness:non-purposeful: None  Paradoxical breathing pattern:abdomen moves in on inspiration: None  Accessory muscle use: rise in clavicle during inspiration: None  Grunting at end-expiration: guttural sound: None  Nasal flaring: involuntary movement of nares: None  Look of fear: None  Total : 0      Vital Signs: Blood pressure 138/80, pulse 66, temperature 97.5 °F (36.4 °C), temperature source 
gangrene    Additional:    Medications:   Current Facility-Administered Medications   Medication Dose Route Frequency    vancomycin (VANCOCIN) 1250 mg in 250 mL IVPB  1,250 mg IntraVENous Q18H    multivitamin 1 tablet  1 tablet Oral Daily    thiamine mononitrate tablet 100 mg  100 mg Oral Daily    enoxaparin (LOVENOX) injection 40 mg  40 mg SubCUTAneous Daily    sodium chloride flush 0.9 % injection 5-40 mL  5-40 mL IntraVENous 2 times per day    sodium chloride flush 0.9 % injection 5-40 mL  5-40 mL IntraVENous PRN    potassium chloride (KLOR-CON M) extended release tablet 40 mEq  40 mEq Oral PRN    Or    potassium bicarb-citric acid (EFFER-K) effervescent tablet 40 mEq  40 mEq Oral PRN    Or    potassium chloride 10 mEq/100 mL IVPB (Peripheral Line)  10 mEq IntraVENous PRN    magnesium sulfate 2000 mg in 50 mL IVPB premix  2,000 mg IntraVENous PRN    ondansetron (ZOFRAN-ODT) disintegrating tablet 4 mg  4 mg Oral Q8H PRN    Or    ondansetron (ZOFRAN) injection 4 mg  4 mg IntraVENous Q6H PRN    polyethylene glycol (GLYCOLAX) packet 17 g  17 g Oral Daily PRN    famotidine (PEPCID) tablet 20 mg  20 mg Oral BID    piperacillin-tazobactam (ZOSYN) 3,375 mg in sodium chloride 0.9 % 50 mL IVPB (mini-bag)  3,375 mg IntraVENous Q8H    atenolol (TENORMIN) tablet 50 mg  50 mg Oral Daily    levothyroxine (SYNTHROID) tablet 50 mcg  50 mcg Oral Daily    rosuvastatin (CRESTOR) tablet 40 mg  40 mg Oral QPM    sertraline (ZOLOFT) tablet 50 mg  50 mg Oral Daily    traZODone (DESYREL) tablet 50 mg  50 mg Oral Nightly    acetaminophen (TYLENOL) tablet 650 mg  650 mg Oral Q6H PRN    Or    acetaminophen (TYLENOL) suppository 650 mg  650 mg Rectal Q6H PRN       Labs:    Recent Results (from the past 24 hour(s))   CBC with Auto Differential    Collection Time: 01/02/25  4:41 AM   Result Value Ref Range    WBC 13.8 (H) 4.6 - 13.2 K/uL    RBC 4.38 4.35 - 5.65 M/uL    Hemoglobin 11.9 (L) 13.0 - 16.0 g/dL    Hematocrit 39.0 36.0 - 48.0 %    
in bed, no acute distress.  Patient is more alert and awake today.  Patient is refusing amputation.  Psych reconsulted to determine patient's capacity.  Case management on board and recommended to start authorization for skilled nursing facility.    -patient seen evaluated, lying in bed, no acute distress, appreciate psych reconsultation, patient does not have capacity at this time.  Patient continues to refuse surgery.  Case management has started authorization for skilled nursing facility.  Continue to follow.    -patient seen evaluated, lying in bed, no acute distress.  Patient continues to refuse surgery.  Continue IV antibiotics  Will follow.    -patient seen evaluated, lying in bed, no acute distress.  Patient continues to refuse surgery.  Continue IV antibiotics, will continue to follow.    -case management on board for discharge planning to skilled nursing facility.  Continue current treatment plan, will follow.      Dispo plan: SNF once medically stable, anticipated discharge is unclear at this time as the timing of surgery is not clear        Case discussed with:  [x]Patient  []Family  [x]Nursing  []Case Management  DVT Prophylaxis:  [x]Lovenox  []Hep SQ  []SCDs  []Coumadin   []Eliquis/Xarelto     Objective:   VS: BP (!) 149/93   Pulse 71   Temp 98 °F (36.7 °C) (Oral)   Resp 16   Ht 1.778 m (5' 10\")   Wt 64.6 kg (142 lb 6.4 oz)   SpO2 93%   BMI 20.43 kg/m²    Tmax/24hrs: Temp (24hrs), Av.9 °F (36.6 °C), Min:97.2 °F (36.2 °C), Max:98.1 °F (36.7 °C)  IOBRIEF  Intake/Output Summary (Last 24 hours) at 2025 1140  Last data filed at 2025 0944  Gross per 24 hour   Intake 500 ml   Output 1400 ml   Net -900 ml       General:  Awake, follows simple commands.  Remains pleasantly confused  Cardiovascular:  S1S2+, RRR  Pulmonary:  CTA b/l  GI:  Soft, BS+, NT, ND  Extremities: Left foot with dry gangrene    Additional:    Medications:   Current Facility-Administered Medications 
malnutrition, in context of social or environmental circumstances related to lack or limited access to food as evidenced by criteria as identified in malnutrition assessment  Increased nutrient needs related to increase demand for energy/nutrients as evidenced by wounds    Nutrition Interventions:   Food and/or Nutrient Delivery: Continue NPO  Nutrition Education/Counseling: Survival skills/brief education completed  Coordination of Nutrition Care: Continue to monitor while inpatient     Goals:  Goals: Initiate PO diet, by next RD assessment     Nutrition Monitoring and Evaluation:   Behavioral-Environmental Outcomes: None Identified  Food/Nutrient Intake Outcomes: Progression of Nutrition, Diet Advancement/Tolerance  Physical Signs/Symptoms Outcomes: Biochemical Data, GI Status, Skin, Weight    Discharge Planning:    Too soon to determine     Jacque Ma MS, RDN  Contact [phone]  Available via [employer]  
    Social History     Tobacco Use   Smoking Status Not on file   Smokeless Tobacco Not on file        Temp (24hrs), Av.4 °F (36.9 °C), Min:97.9 °F (36.6 °C), Max:99.5 °F (37.5 °C)    /64   Pulse 66   Temp 97.9 °F (36.6 °C) (Oral)   Resp 16   Ht 1.778 m (5' 10\")   Wt 62.6 kg (138 lb)   SpO2 91%   BMI 19.80 kg/m²     ROS: 12 point ROS obtained in details. Pertinent positives as mentioned in HPI,   otherwise negative    Physical Exam:      General: NAD, appears stated age, alert    Eyes: sclera is non-icteric  HENT: normocephalic/atraumatic, moist mucus membranes  Respiratory: CTA with no signs of respiratory distress  Cardiovascular: RRR, no peripheral edema of extremities  GI: soft, non-tender, normal bowel sounds  Neuro: moves all extremities, no focal deficits, normal speech  Psych: appropriate mood and affect, no visual or auditory hallucinations  Skin: dry gangrene left foot    Labs: Results:   Chemistry Recent Labs     24  0434 24  0548 24  0448   GLUCOSE 97 91 106*    140 142   K 3.4* 4.2 3.7    109 108   CO2 28 28 28   BUN 13 14 14   CREATININE 0.88 0.85 0.76      CBC w/Diff Recent Labs     24  0434 24  0548 24  0448   WBC 8.3 10.3 11.4   RBC 3.91* 3.97* 4.02*   HGB 10.8* 11.0* 11.2*   HCT 35.4* 36.2 36.2   * 99* 108*      Microbiology Results       Procedure Component Value Units Date/Time    Culture, Wound (with Gram Stain) [[id number]]  (Abnormal) Collected: 24 0015    Order Status: Completed Specimen: Foot Updated: 24 1430     Special Requests NO SPECIAL REQUESTS        Gram Stain NO WBC'S SEEN         2+ GRAM VARIABLE RODS        Culture       HEAVY Mixed Gram Negative Rods          Culture, Blood 2 [[id number]] Collected: 24 0005    Order Status: Completed Specimen: Blood Updated: 24 0736     Special Requests NO SPECIAL REQUESTS        Culture NO GROWTH 3 DAYS       Culture, Blood 1 [[id number]] Collected: 
(L) 21 - 52 %    Monocytes % 9 3 - 10 %    Eosinophils % 0 0 - 5 %    Basophils % 0 0 - 2 %    Immature Granulocytes % 1 (H) 0.0 - 0.5 %    Neutrophils Absolute 9.0 (H) 1.8 - 8.0 K/UL    Lymphocytes Absolute 1.3 0.9 - 3.6 K/UL    Monocytes Absolute 1.1 0.05 - 1.2 K/UL    Eosinophils Absolute 0.0 0.0 - 0.4 K/UL    Basophils Absolute 0.0 0.0 - 0.1 K/UL    Immature Granulocytes Absolute 0.1 (H) 0.00 - 0.04 K/UL    Differential Type AUTOMATED         Signed By: Elis Duong MD     December 27, 2024      Disclaimer: Sections of this note are dictated using utilizing voice recognition software.  Minor typographical errors may be present. If questions arise, please do not hesitate to contact me or call our department.      
Max:98.1 °F (36.7 °C)    /71   Pulse 61   Temp 97.6 °F (36.4 °C) (Axillary)   Resp 16   Ht 1.778 m (5' 10\")   Wt 62.6 kg (138 lb)   SpO2 92%   BMI 19.80 kg/m²     ROS: 12 point ROS obtained in details. Pertinent positives as mentioned in HPI,   otherwise negative    Physical Exam:      General: NAD, appears stated age, alert    Eyes: sclera is non-icteric  HENT: normocephalic/atraumatic, moist mucus membranes  Respiratory: CTA with no signs of respiratory distress  Cardiovascular: RRR, no peripheral edema of extremities  GI: soft, non-tender, normal bowel sounds  Neuro: moves all extremities, no focal deficits, normal speech  Psych: appropriate mood and affect, no visual or auditory hallucinations  Skin: dry gangrene left foot    Labs: Results:   Chemistry Recent Labs     12/30/24  1804 01/01/25  0425   GLUCOSE 139* 108*    138   K 3.7 3.7    104   CO2 37* 31   BUN 17 18   CREATININE 0.68 0.71   GLOB 4.0  --    ALT 34  --    AST 24  --         CBC w/Diff Recent Labs     12/30/24  1804 01/01/25  0425   WBC 12.6 13.7*   RBC 4.26* 4.29*   HGB 11.6* 11.4*   HCT 38.7 38.0    158        Microbiology Results       Procedure Component Value Units Date/Time    Culture, Wound (with Gram Stain) [[id number]]  (Abnormal) Collected: 12/24/24 0015    Order Status: Completed Specimen: Foot Updated: 12/26/24 1430     Special Requests NO SPECIAL REQUESTS        Gram Stain NO WBC'S SEEN         2+ GRAM VARIABLE RODS        Culture       HEAVY Mixed Gram Negative Rods          Culture, Blood 2 [[id number]] Collected: 12/24/24 0005    Order Status: Completed Specimen: Blood Updated: 12/30/24 0656     Special Requests NO SPECIAL REQUESTS        Culture NO GROWTH 6 DAYS       Culture, Blood 1 [[id number]] Collected: 12/23/24 2355    Order Status: Completed Specimen: Blood Updated: 12/30/24 0656     Special Requests NO SPECIAL REQUESTS        Culture NO GROWTH 6 DAYS                   RADIOLOGY:    All 
Special Requests NO SPECIAL REQUESTS        Culture NO GROWTH 6 DAYS                   RADIOLOGY:    All available imaging studies/reports in [facility] for this admission were reviewed        Disclaimer: Sections of this note are dictated utilizing voice recognition software, which may have resulted in some phonetic based errors in grammar and contents. Even though attempts were made to correct all the mistakes, some may have been missed, and remained in the body of the document. If questions arise, please contact our department.    Dr. Ngoc Vera Infectious Disease Specialist  [phone]  January 3, 2025  11:09 AM  
Updated: 12/30/24 0656     Special Requests NO SPECIAL REQUESTS        Culture NO GROWTH 6 DAYS                   RADIOLOGY:    All available imaging studies/reports in [facility] for this admission were reviewed        Disclaimer: Sections of this note are dictated utilizing voice recognition software, which may have resulted in some phonetic based errors in grammar and contents. Even though attempts were made to correct all the mistakes, some may have been missed, and remained in the body of the document. If questions arise, please contact our department.    Dr. Ngoc Vera Infectious Disease Specialist  [phone]  January 2, 2025  10:31 AM

## 2025-01-08 NOTE — DISCHARGE SUMMARY
orthopedic surgery.  Patient was recommended that he get a higher level of amputation secondary to significant peripheral arterial disease however patient refused.  Psych consulted to see if patient was capable of medical decision making and per psych patient is not capable of medical decision making.  Vascular surgery consulted and recommended that patient have a left AKA given the extent of his disease.  Cardiology consulted for preop eval and clearance.  Hospitalist discussed with the patient's son regarding his amputation and per son patient never made any medical power of  paperwork.  Per son the orthopedist at [facility] were trying to do an amputation but patient declined multiple times and he was subsequently discharged.  Psych subsequently consulted.  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  Patient initially agreed to surgery and then refused again.  Psych was reconsulted and again deemed that patient was not capable of medical decision making.  Spoke with the patient's wife who mentioned that she is agreeable to do what ever the patient wants.    Patient is currently refusing surgery.  Orthopedics signed off.  Case management  for discharge planning to skilled nursing facility.  Per ID patient will continue p.o. antibiotics till 11/24.  Patient is being discharged today.    Discharge plan discussed with patient/family who verbalized understanding.    Readmission Risk:    Moderate    FOLLOW-UP RECOMMENDATIONS:     [facility](LINK)  [address] [address] [phone]        Mignon Foster PA  [address] [id number] [phone]    Schedule an appointment as soon as possible for a visit in 2 week(s)      Mignon Foster PA  [address] [place] [id number] [phone]               Consults: Cardiology, Infectious Disease, and Vascular

## 2025-01-08 NOTE — CARE COORDINATION
Discharge order noted for today. Patient has been accepted to [facility] skilled nursing facility. Confirmed with Nisreen that bed is available today.  Met with patient agreeable to the transition plan today. **Insurance authorization has been obtained.** [facility] at 3:15 time. Patient's discharge summary has been forwarded to skilled nursing facility via [facility]. Bedside RNGricelda has been updated to the transition plan. Discharge information has been updated on the AVS.  Please call report to [phone].      Ashli SABILLON  Case Management

## 2025-01-08 NOTE — CARE COORDINATION
JARED spoke with [employer] transportation  [phone] transport 01/08/2025 at 3:15 p.m.      Trip Number [id number]      Ashli Ames BSW  Case Management

## 2025-01-08 NOTE — CARE COORDINATION
01/08/25 1311   IMM Letter   IMM Letter given to Patient/Family/Significant other/Guardian/POA/by: Beth Ames: patient is confused   IMM Letter date given: 01/08/25   IMM Letter time given: 1312     Patient waived 4 hour notice of discharge requirement by Medicare.    Ashli Ames BSW  Case Management

## 2025-01-22 ENCOUNTER — HOSPITAL ENCOUNTER (INPATIENT)
Facility: HOSPITAL | Age: 85
LOS: 7 days | Discharge: NURSING, REHAB OR LONG-TERM CARE | DRG: 239 | End: 2025-01-29
Attending: STUDENT IN AN ORGANIZED HEALTH CARE EDUCATION/TRAINING PROGRAM | Admitting: STUDENT IN AN ORGANIZED HEALTH CARE EDUCATION/TRAINING PROGRAM
Payer: MEDICARE

## 2025-01-22 DIAGNOSIS — Z89.612 S/P AKA (ABOVE KNEE AMPUTATION), LEFT (HCC): Primary | ICD-10-CM

## 2025-01-22 PROCEDURE — 6370000000 HC RX 637 (ALT 250 FOR IP): Performed by: STUDENT IN AN ORGANIZED HEALTH CARE EDUCATION/TRAINING PROGRAM

## 2025-01-22 PROCEDURE — 1100000000 HC RM PRIVATE

## 2025-01-22 RX ORDER — OXYCODONE HYDROCHLORIDE 5 MG/1
5 TABLET ORAL EVERY 4 HOURS PRN
Status: DISCONTINUED | OUTPATIENT
Start: 2025-01-22 | End: 2025-01-29 | Stop reason: HOSPADM

## 2025-01-22 RX ORDER — ROSUVASTATIN CALCIUM 20 MG/1
40 TABLET, COATED ORAL EVERY EVENING
Status: DISCONTINUED | OUTPATIENT
Start: 2025-01-22 | End: 2025-01-29 | Stop reason: HOSPADM

## 2025-01-22 RX ORDER — TRAZODONE HYDROCHLORIDE 50 MG/1
50 TABLET, FILM COATED ORAL NIGHTLY
Status: DISCONTINUED | OUTPATIENT
Start: 2025-01-22 | End: 2025-01-26

## 2025-01-22 RX ORDER — LEVOTHYROXINE SODIUM 50 UG/1
50 TABLET ORAL DAILY
Status: DISCONTINUED | OUTPATIENT
Start: 2025-01-23 | End: 2025-01-29 | Stop reason: HOSPADM

## 2025-01-22 RX ORDER — ATENOLOL 50 MG/1
50 TABLET ORAL DAILY
Status: DISCONTINUED | OUTPATIENT
Start: 2025-01-23 | End: 2025-01-29 | Stop reason: HOSPADM

## 2025-01-22 RX ADMIN — TRAZODONE HYDROCHLORIDE 50 MG: 50 TABLET ORAL at 22:51

## 2025-01-22 RX ADMIN — ROSUVASTATIN CALCIUM 40 MG: 20 TABLET, FILM COATED ORAL at 22:51

## 2025-01-22 ASSESSMENT — PAIN SCALES - GENERAL: PAINLEVEL_OUTOF10: 0

## 2025-01-23 PROBLEM — I73.9 PAD (PERIPHERAL ARTERY DISEASE) (HCC): Status: ACTIVE | Noted: 2025-01-23

## 2025-01-23 PROBLEM — Z02.79 ENCOUNTER FOR ASSESSMENT OF HEALTHCARE DECISION-MAKING CAPACITY: Status: ACTIVE | Noted: 2025-01-23

## 2025-01-23 PROBLEM — E43 SEVERE PROTEIN-CALORIE MALNUTRITION (HCC): Status: ACTIVE | Noted: 2024-12-24

## 2025-01-23 PROBLEM — I70.221 CRITICAL LIMB ISCHEMIA OF RIGHT LOWER EXTREMITY (HCC): Status: ACTIVE | Noted: 2025-01-23

## 2025-01-23 LAB
ALBUMIN SERPL-MCNC: 2.5 G/DL (ref 3.4–5)
ALBUMIN/GLOB SERPL: 0.8 (ref 0.8–1.7)
ALP SERPL-CCNC: 84 U/L (ref 45–117)
ALT SERPL-CCNC: 40 U/L (ref 16–61)
ANION GAP SERPL CALC-SCNC: 3 MMOL/L (ref 3–18)
AST SERPL-CCNC: 19 U/L (ref 10–38)
BILIRUB SERPL-MCNC: 1 MG/DL (ref 0.2–1)
BUN SERPL-MCNC: 15 MG/DL (ref 7–18)
BUN/CREAT SERPL: 22 (ref 12–20)
CALCIUM SERPL-MCNC: 8.5 MG/DL (ref 8.5–10.1)
CHLORIDE SERPL-SCNC: 110 MMOL/L (ref 100–111)
CO2 SERPL-SCNC: 29 MMOL/L (ref 21–32)
CREAT SERPL-MCNC: 0.68 MG/DL (ref 0.6–1.3)
GLOBULIN SER CALC-MCNC: 3.2 G/DL (ref 2–4)
GLUCOSE SERPL-MCNC: 86 MG/DL (ref 74–99)
POTASSIUM SERPL-SCNC: 3.7 MMOL/L (ref 3.5–5.5)
PROT SERPL-MCNC: 5.7 G/DL (ref 6.4–8.2)
SODIUM SERPL-SCNC: 142 MMOL/L (ref 136–145)

## 2025-01-23 PROCEDURE — 6370000000 HC RX 637 (ALT 250 FOR IP): Performed by: STUDENT IN AN ORGANIZED HEALTH CARE EDUCATION/TRAINING PROGRAM

## 2025-01-23 PROCEDURE — 99222 1ST HOSP IP/OBS MODERATE 55: CPT | Performed by: SURGERY

## 2025-01-23 PROCEDURE — 36415 COLL VENOUS BLD VENIPUNCTURE: CPT

## 2025-01-23 PROCEDURE — 80053 COMPREHEN METABOLIC PANEL: CPT

## 2025-01-23 PROCEDURE — 99223 1ST HOSP IP/OBS HIGH 75: CPT | Performed by: INTERNAL MEDICINE

## 2025-01-23 PROCEDURE — 87040 BLOOD CULTURE FOR BACTERIA: CPT

## 2025-01-23 PROCEDURE — 2500000003 HC RX 250 WO HCPCS: Performed by: STUDENT IN AN ORGANIZED HEALTH CARE EDUCATION/TRAINING PROGRAM

## 2025-01-23 PROCEDURE — 99233 SBSQ HOSP IP/OBS HIGH 50: CPT | Performed by: STUDENT IN AN ORGANIZED HEALTH CARE EDUCATION/TRAINING PROGRAM

## 2025-01-23 PROCEDURE — 94761 N-INVAS EAR/PLS OXIMETRY MLT: CPT

## 2025-01-23 PROCEDURE — 1100000000 HC RM PRIVATE

## 2025-01-23 PROCEDURE — 99221 1ST HOSP IP/OBS SF/LOW 40: CPT | Performed by: PSYCHIATRY & NEUROLOGY

## 2025-01-23 RX ORDER — ACETAMINOPHEN 650 MG/1
650 SUPPOSITORY RECTAL EVERY 6 HOURS PRN
Status: DISCONTINUED | OUTPATIENT
Start: 2025-01-23 | End: 2025-01-29 | Stop reason: HOSPADM

## 2025-01-23 RX ORDER — POTASSIUM CHLORIDE 7.45 MG/ML
10 INJECTION INTRAVENOUS PRN
Status: DISCONTINUED | OUTPATIENT
Start: 2025-01-23 | End: 2025-01-29 | Stop reason: HOSPADM

## 2025-01-23 RX ORDER — ACETAMINOPHEN 325 MG/1
650 TABLET ORAL EVERY 6 HOURS PRN
Status: DISCONTINUED | OUTPATIENT
Start: 2025-01-23 | End: 2025-01-29 | Stop reason: HOSPADM

## 2025-01-23 RX ORDER — ONDANSETRON 4 MG/1
4 TABLET, ORALLY DISINTEGRATING ORAL EVERY 8 HOURS PRN
Status: DISCONTINUED | OUTPATIENT
Start: 2025-01-23 | End: 2025-01-29 | Stop reason: HOSPADM

## 2025-01-23 RX ORDER — MULTIVITAMIN WITH IRON
1 TABLET ORAL DAILY
Status: DISCONTINUED | OUTPATIENT
Start: 2025-01-23 | End: 2025-01-29 | Stop reason: HOSPADM

## 2025-01-23 RX ORDER — SODIUM CHLORIDE 0.9 % (FLUSH) 0.9 %
5-40 SYRINGE (ML) INJECTION EVERY 12 HOURS SCHEDULED
Status: DISCONTINUED | OUTPATIENT
Start: 2025-01-23 | End: 2025-01-29 | Stop reason: HOSPADM

## 2025-01-23 RX ORDER — SODIUM CHLORIDE 0.9 % (FLUSH) 0.9 %
5-40 SYRINGE (ML) INJECTION PRN
Status: DISCONTINUED | OUTPATIENT
Start: 2025-01-23 | End: 2025-01-29 | Stop reason: HOSPADM

## 2025-01-23 RX ORDER — ONDANSETRON 2 MG/ML
4 INJECTION INTRAMUSCULAR; INTRAVENOUS EVERY 6 HOURS PRN
Status: DISCONTINUED | OUTPATIENT
Start: 2025-01-23 | End: 2025-01-29 | Stop reason: HOSPADM

## 2025-01-23 RX ORDER — SODIUM CHLORIDE 9 MG/ML
INJECTION, SOLUTION INTRAVENOUS PRN
Status: DISCONTINUED | OUTPATIENT
Start: 2025-01-23 | End: 2025-01-29 | Stop reason: HOSPADM

## 2025-01-23 RX ORDER — POTASSIUM CHLORIDE 1500 MG/1
40 TABLET, EXTENDED RELEASE ORAL PRN
Status: DISCONTINUED | OUTPATIENT
Start: 2025-01-23 | End: 2025-01-29 | Stop reason: HOSPADM

## 2025-01-23 RX ORDER — MAGNESIUM SULFATE IN WATER 40 MG/ML
2000 INJECTION, SOLUTION INTRAVENOUS PRN
Status: DISCONTINUED | OUTPATIENT
Start: 2025-01-23 | End: 2025-01-29 | Stop reason: HOSPADM

## 2025-01-23 RX ORDER — ENOXAPARIN SODIUM 100 MG/ML
30 INJECTION SUBCUTANEOUS DAILY
Status: DISCONTINUED | OUTPATIENT
Start: 2025-01-23 | End: 2025-01-24

## 2025-01-23 RX ORDER — GAUZE BANDAGE 2" X 2"
100 BANDAGE TOPICAL DAILY
Status: DISCONTINUED | OUTPATIENT
Start: 2025-01-23 | End: 2025-01-29 | Stop reason: HOSPADM

## 2025-01-23 RX ORDER — POLYETHYLENE GLYCOL 3350 17 G/17G
17 POWDER, FOR SOLUTION ORAL DAILY PRN
Status: DISCONTINUED | OUTPATIENT
Start: 2025-01-23 | End: 2025-01-29 | Stop reason: HOSPADM

## 2025-01-23 RX ADMIN — THIAMINE HCL TAB 100 MG 100 MG: 100 TAB at 19:15

## 2025-01-23 RX ADMIN — OXYCODONE HYDROCHLORIDE 5 MG: 5 TABLET ORAL at 22:13

## 2025-01-23 RX ADMIN — LEVOTHYROXINE SODIUM 50 MCG: 0.05 TABLET ORAL at 05:24

## 2025-01-23 RX ADMIN — ROSUVASTATIN CALCIUM 40 MG: 20 TABLET, FILM COATED ORAL at 19:15

## 2025-01-23 RX ADMIN — SODIUM CHLORIDE, PRESERVATIVE FREE 10 ML: 5 INJECTION INTRAVENOUS at 09:08

## 2025-01-23 RX ADMIN — THERA TABS 1 TABLET: TAB at 19:15

## 2025-01-23 RX ADMIN — TRAZODONE HYDROCHLORIDE 50 MG: 50 TABLET ORAL at 21:03

## 2025-01-23 ASSESSMENT — PAIN SCALES - WONG BAKER: WONGBAKER_NUMERICALRESPONSE: NO HURT

## 2025-01-23 ASSESSMENT — PAIN SCALES - GENERAL
PAINLEVEL_OUTOF10: 0
PAINLEVEL_OUTOF10: 7
PAINLEVEL_OUTOF10: 0
PAINLEVEL_OUTOF10: 0

## 2025-01-23 ASSESSMENT — PAIN DESCRIPTION - ONSET: ONSET: GRADUAL

## 2025-01-23 ASSESSMENT — PAIN DESCRIPTION - FREQUENCY: FREQUENCY: INTERMITTENT

## 2025-01-23 ASSESSMENT — PAIN - FUNCTIONAL ASSESSMENT: PAIN_FUNCTIONAL_ASSESSMENT: ACTIVITIES ARE NOT PREVENTED

## 2025-01-23 ASSESSMENT — PAIN DESCRIPTION - DIRECTION: RADIATING_TOWARDS: LEFT FOOT

## 2025-01-23 ASSESSMENT — PAIN DESCRIPTION - ORIENTATION: ORIENTATION: LEFT

## 2025-01-23 ASSESSMENT — PAIN DESCRIPTION - PAIN TYPE: TYPE: CHRONIC PAIN

## 2025-01-23 ASSESSMENT — PAIN DESCRIPTION - DESCRIPTORS: DESCRIPTORS: ACHING

## 2025-01-23 ASSESSMENT — PAIN DESCRIPTION - LOCATION: LOCATION: FOOT

## 2025-01-23 NOTE — CARE COORDINATION
01/23/25 1525   Service Assessment   Patient Orientation Alert and Oriented;Person;Place;Situation;Self   Cognition Alert   History Provided By Patient   Primary Caregiver Other (Comment)  ([facility]   Accompanied By/Relationship No one is currently at the bedside with patient at this time.   Support Systems Spouse/Significant Other;Children   Patient's Healthcare Decision Maker is: Legal Next of Kin   PCP Verified by CM No  (Patient is being followed by MD at the nursing facility.)   Last Visit to PCP Within last two years   Prior Functional Level Assistance with the following:;Bathing;Dressing;Toileting;Mobility   Current Functional Level Assistance with the following:;Bathing;Dressing;Toileting;Mobility   Can patient return to prior living arrangement Yes   Ability to make needs known: Good   Family able to assist with home care needs: No   Would you like for me to discuss the discharge plan with any other family members/significant others, and if so, who? Yes  (Patient's wife, listed in patient's contact list.)   Financial Resources Medicare   Community Resources None   CM/SW Referral Other (see comment)  (Discharge planning.)   Social/Functional History   Lives With Other (Comment)  (Patient resides at [facility].)   Type of Home Facility   Home Layout One level   Home Access Ramped entrance   Bathroom Shower/Tub Walk-in shower   Bathroom Toilet Handicap height   Bathroom Equipment Grab bars in shower   Bathroom Accessibility Accessible   Home Equipment   (Patient has all needed DME at nursing facility.)   Receives Help From Other (Comment)  ([facility]   Prior Level of Assist for ADLs Needs assistance   Toileting Needs assistance   Prior Level of Assist for Homemaking Needs assistance   Homemaking Responsibilities No   Ambulation Assistance Needs assistance   Prior Level of Assist for Transfers

## 2025-01-23 NOTE — CARE COORDINATION
01/23/25 1522   Readmission Assessment   Number of Days since last admission? 8-30 days   Previous Disposition SNF  (SNF to [facility])   Who is being Interviewed Patient   What was the patient's/caregiver's perception as to why they think they needed to return back to the hospital? Other (Comment)  (Patient said  advised for patient to return to the hospital for and amputation.)   Did you visit your Primary Care Physician after you left the hospital, before you returned this time? No  (Patient is being followed by MD at nursing facility.)   Did you see a specialist, such as Cardiac, Pulmonary, Orthopedic Physician, etc. after you left the hospital? No   Who advised the patient to return to the hospital? Physician  (At the [facility] to [facility].)   Does the patient report anything that got in the way of taking their medications? No   In our efforts to provide the best possible care to you and others like you, can you think of anything that we could have done to help you after you left the hospital the first time, so that you might not have needed to return so soon? Other (Comment)  (Patient said nothing the hospital could have done to prevent readmission to the hospital.)

## 2025-01-23 NOTE — CARE COORDINATION
SNF referral sent out to SNF to [facility] in [facility] for discharge planning.             Shawnee Mancera, RN  Case Management [phone]

## 2025-01-23 NOTE — H&P
History & Physical    Patient: Stefano Begum Jr. MRN: [id number]  [facility]: [id number]    YOB: 1940  Age: 84 y.o.  Sex: male      DOA: 1/22/2025    Chief Complaint: No chief complaint on file.         HPI:   This is an 84-year-old male past medical history of severe peripheral arterial disease, dry gangrene of the left foot, status post TMA amputation of the right foot presented to the [facility] ED for evaluation of left lower extremity.  At the bedside describes left lower extremity pain to be well-controlled with oral oxycodone.  Patient has been battling nonhealing left foot wound for 2 months.  He has been previously evaluated here with CT of the left lower extremity last month.  No interventions in the outside ED.  Prescribed to have \"granulation forefoot\".  Apparently has been on antibiotics for osteomyelitis.   Outside ED provider ensured no cellulitic tracking or not meeting sepsis criteria.  Transferred here for further multidisciplinary evaluation patient is now amenable to left lower extremity amputation if indicated.    Past Medical History:   Diagnosis Date    BPH (benign prostatic hyperplasia) 12/24/2024    Chronic CHF (congestive heart failure) (HCC) 12/24/2024    Depression     History of atrial fibrillation 12/24/2024    Hyperlipidemia     Hypertension     Hypothyroidism 12/24/2024    Peripheral arterial disease (HCC) 12/24/2024    Peripheral arterial disease (HCC)        Past Surgical History:   Procedure Laterality Date    FOOT AMPUTATION THROUGH METATARSAL Left     Left foot all toes amputated       No family history on file.    Social History     Socioeconomic History    Marital status:    Tobacco Use    Smoking status: Never    Smokeless tobacco: Never   Vaping Use    Vaping status: Never Used   Substance and Sexual Activity    Alcohol use: Not Currently    Drug use: Never    Sexual activity: Not Currently     Social Determinants of Health     Food Insecurity:

## 2025-01-23 NOTE — CARE COORDINATION
SNF to [facility] accepted patient in [facility].   CM opened the chart in [facility] for SNF to [facility].           Shawnee Mancera RN  Case Management [phone]

## 2025-01-24 ENCOUNTER — ANESTHESIA EVENT (OUTPATIENT)
Facility: HOSPITAL | Age: 85
End: 2025-01-24
Payer: MEDICARE

## 2025-01-24 LAB
ANION GAP SERPL CALC-SCNC: 6 MMOL/L (ref 3–18)
BASOPHILS # BLD: 0.04 K/UL (ref 0–0.1)
BASOPHILS NFR BLD: 0.3 % (ref 0–2)
BUN SERPL-MCNC: 13 MG/DL (ref 7–18)
BUN/CREAT SERPL: 19 (ref 12–20)
CALCIUM SERPL-MCNC: 8.9 MG/DL (ref 8.5–10.1)
CHLORIDE SERPL-SCNC: 109 MMOL/L (ref 100–111)
CO2 SERPL-SCNC: 29 MMOL/L (ref 21–32)
CREAT SERPL-MCNC: 0.7 MG/DL (ref 0.6–1.3)
DIFFERENTIAL METHOD BLD: ABNORMAL
EOSINOPHIL # BLD: 0.03 K/UL (ref 0–0.4)
EOSINOPHIL NFR BLD: 0.3 % (ref 0–5)
ERYTHROCYTE [DISTWIDTH] IN BLOOD BY AUTOMATED COUNT: 18.1 % (ref 11.6–14.5)
GLUCOSE SERPL-MCNC: 88 MG/DL (ref 74–99)
HCT VFR BLD AUTO: 43.7 % (ref 36–48)
HGB BLD-MCNC: 13.4 G/DL (ref 13–16)
HISTORY CHECK: NORMAL
IMM GRANULOCYTES # BLD AUTO: 0.04 K/UL (ref 0–0.04)
IMM GRANULOCYTES NFR BLD AUTO: 0.3 % (ref 0–0.5)
INR PPP: 1.3 (ref 0.9–1.1)
LYMPHOCYTES # BLD: 1.33 K/UL (ref 0.9–3.6)
LYMPHOCYTES NFR BLD: 11.5 % (ref 21–52)
MCH RBC QN AUTO: 27.5 PG (ref 24–34)
MCHC RBC AUTO-ENTMCNC: 30.7 G/DL (ref 31–37)
MCV RBC AUTO: 89.5 FL (ref 78–100)
MONOCYTES # BLD: 0.74 K/UL (ref 0.05–1.2)
MONOCYTES NFR BLD: 6.4 % (ref 3–10)
NEUTS SEG # BLD: 9.4 K/UL (ref 1.8–8)
NEUTS SEG NFR BLD: 81.2 % (ref 40–73)
NRBC # BLD: 0 K/UL (ref 0–0.01)
NRBC BLD-RTO: 0 PER 100 WBC
PLATELET # BLD AUTO: 109 K/UL (ref 135–420)
PMV BLD AUTO: 11.3 FL (ref 9.2–11.8)
POTASSIUM SERPL-SCNC: 3.7 MMOL/L (ref 3.5–5.5)
PROTHROMBIN TIME: 16.6 SEC (ref 11.9–14.9)
RBC # BLD AUTO: 4.88 M/UL (ref 4.35–5.65)
SODIUM SERPL-SCNC: 144 MMOL/L (ref 136–145)
WBC # BLD AUTO: 11.6 K/UL (ref 4.6–13.2)

## 2025-01-24 PROCEDURE — 6360000002 HC RX W HCPCS: Performed by: INTERNAL MEDICINE

## 2025-01-24 PROCEDURE — 99233 SBSQ HOSP IP/OBS HIGH 50: CPT | Performed by: STUDENT IN AN ORGANIZED HEALTH CARE EDUCATION/TRAINING PROGRAM

## 2025-01-24 PROCEDURE — 2500000003 HC RX 250 WO HCPCS: Performed by: STUDENT IN AN ORGANIZED HEALTH CARE EDUCATION/TRAINING PROGRAM

## 2025-01-24 PROCEDURE — 2580000003 HC RX 258: Performed by: STUDENT IN AN ORGANIZED HEALTH CARE EDUCATION/TRAINING PROGRAM

## 2025-01-24 PROCEDURE — 1100000000 HC RM PRIVATE

## 2025-01-24 PROCEDURE — 85610 PROTHROMBIN TIME: CPT

## 2025-01-24 PROCEDURE — 6370000000 HC RX 637 (ALT 250 FOR IP): Performed by: STUDENT IN AN ORGANIZED HEALTH CARE EDUCATION/TRAINING PROGRAM

## 2025-01-24 PROCEDURE — 36415 COLL VENOUS BLD VENIPUNCTURE: CPT

## 2025-01-24 PROCEDURE — 51798 US URINE CAPACITY MEASURE: CPT

## 2025-01-24 PROCEDURE — 85025 COMPLETE CBC W/AUTO DIFF WBC: CPT

## 2025-01-24 PROCEDURE — 86923 COMPATIBILITY TEST ELECTRIC: CPT

## 2025-01-24 PROCEDURE — 80048 BASIC METABOLIC PNL TOTAL CA: CPT

## 2025-01-24 PROCEDURE — 86901 BLOOD TYPING SEROLOGIC RH(D): CPT

## 2025-01-24 PROCEDURE — 2580000003 HC RX 258: Performed by: INTERNAL MEDICINE

## 2025-01-24 PROCEDURE — 94761 N-INVAS EAR/PLS OXIMETRY MLT: CPT

## 2025-01-24 PROCEDURE — 86900 BLOOD TYPING SEROLOGIC ABO: CPT

## 2025-01-24 PROCEDURE — 86850 RBC ANTIBODY SCREEN: CPT

## 2025-01-24 RX ORDER — SODIUM CHLORIDE 9 MG/ML
INJECTION, SOLUTION INTRAVENOUS CONTINUOUS
Status: DISCONTINUED | OUTPATIENT
Start: 2025-01-24 | End: 2025-01-24

## 2025-01-24 RX ORDER — SODIUM CHLORIDE 9 MG/ML
INJECTION, SOLUTION INTRAVENOUS CONTINUOUS
Status: DISCONTINUED | OUTPATIENT
Start: 2025-01-25 | End: 2025-01-25

## 2025-01-24 RX ORDER — HEPARIN SODIUM 5000 [USP'U]/ML
5000 INJECTION, SOLUTION INTRAVENOUS; SUBCUTANEOUS EVERY 8 HOURS SCHEDULED
Status: DISCONTINUED | OUTPATIENT
Start: 2025-01-24 | End: 2025-01-26

## 2025-01-24 RX ADMIN — SODIUM CHLORIDE: 9 INJECTION, SOLUTION INTRAVENOUS at 09:46

## 2025-01-24 RX ADMIN — ROSUVASTATIN CALCIUM 40 MG: 20 TABLET, FILM COATED ORAL at 18:00

## 2025-01-24 RX ADMIN — THIAMINE HCL TAB 100 MG 100 MG: 100 TAB at 09:46

## 2025-01-24 RX ADMIN — LEVOTHYROXINE SODIUM 50 MCG: 0.05 TABLET ORAL at 05:27

## 2025-01-24 RX ADMIN — PIPERACILLIN AND TAZOBACTAM 4500 MG: 4; .5 INJECTION, POWDER, FOR SOLUTION INTRAVENOUS at 15:54

## 2025-01-24 RX ADMIN — SODIUM CHLORIDE, PRESERVATIVE FREE 10 ML: 5 INJECTION INTRAVENOUS at 20:15

## 2025-01-24 RX ADMIN — TRAZODONE HYDROCHLORIDE 50 MG: 50 TABLET ORAL at 20:15

## 2025-01-24 RX ADMIN — OXYCODONE HYDROCHLORIDE 5 MG: 5 TABLET ORAL at 10:11

## 2025-01-24 RX ADMIN — OXYCODONE HYDROCHLORIDE 5 MG: 5 TABLET ORAL at 21:33

## 2025-01-24 RX ADMIN — PIPERACILLIN AND TAZOBACTAM 3375 MG: 3; .375 INJECTION, POWDER, LYOPHILIZED, FOR SOLUTION INTRAVENOUS at 20:20

## 2025-01-24 RX ADMIN — SODIUM CHLORIDE, PRESERVATIVE FREE 10 ML: 5 INJECTION INTRAVENOUS at 09:00

## 2025-01-24 RX ADMIN — SERTRALINE HYDROCHLORIDE 50 MG: 50 TABLET ORAL at 09:46

## 2025-01-24 RX ADMIN — THERA TABS 1 TABLET: TAB at 09:46

## 2025-01-24 ASSESSMENT — PAIN DESCRIPTION - DESCRIPTORS
DESCRIPTORS: ACHING
DESCRIPTORS: ACHING

## 2025-01-24 ASSESSMENT — PAIN SCALES - GENERAL
PAINLEVEL_OUTOF10: 0
PAINLEVEL_OUTOF10: 7
PAINLEVEL_OUTOF10: 7
PAINLEVEL_OUTOF10: 0

## 2025-01-24 ASSESSMENT — PAIN - FUNCTIONAL ASSESSMENT: PAIN_FUNCTIONAL_ASSESSMENT: PREVENTS OR INTERFERES WITH MANY ACTIVE NOT PASSIVE ACTIVITIES

## 2025-01-24 ASSESSMENT — PAIN DESCRIPTION - LOCATION
LOCATION: BUTTOCKS
LOCATION: FOOT

## 2025-01-24 ASSESSMENT — PAIN DESCRIPTION - ORIENTATION
ORIENTATION: MID
ORIENTATION: MID

## 2025-01-24 ASSESSMENT — PAIN DESCRIPTION - FREQUENCY: FREQUENCY: INTERMITTENT

## 2025-01-24 ASSESSMENT — PAIN SCALES - WONG BAKER: WONGBAKER_NUMERICALRESPONSE: NO HURT

## 2025-01-24 ASSESSMENT — PAIN DESCRIPTION - ONSET: ONSET: GRADUAL

## 2025-01-24 ASSESSMENT — PAIN DESCRIPTION - PAIN TYPE: TYPE: ACUTE PAIN

## 2025-01-24 NOTE — DISCHARGE INSTR - DIET
Good nutrition is important when healing from an illness, injury, or surgery.  Follow any nutrition recommendations given to you during your hospital stay.   If you were given an oral nutrition supplement while in the hospital, continue to take this supplement at home.  You can take it with meals, in-between meals, and/or before bedtime. These supplements can be purchased at most local grocery stores, pharmacies, and chain super-stores.   If you have any questions about your diet or nutrition, call the hospital and ask for the dietitian.  Recommend Continuing Oral Nutrition Supplement (ONS) at Discharge.   Ensure Complete or Ensure Plus or a comparable/similar product Two-Three times daily for 30 days unless otherwise directed by your Primary Care Physician.     Nutrition Supplements  If you are not able to eat enough food or if you have extra calorie requirements, oral supplements can provide you with additional calories, protein, vitamins and minerals.     Where do you find oral Nutrition Supplements?  These supplements are available at most pharmacies, grocery retailers like [employer], and many others.    How do I pay for these oral supplements?  1. Use coupons  2. Go online to register for coupons and health tips!   www.ensure.com - click “register”  www.boost/com - click “coupons and videos”  [employer]point - click “breakfast club”  3. Investigate your eligibility for prescription assistance: www.pparx.org/en/prescription_assistance_programs/discount_cards  4. Get co-pay card to get a discount of at least $10 (with your 30 day prescription), on some insurance plans with card will cover the ensure fee of oral supplements (making it free!)    Elin Sullivan RD

## 2025-01-24 NOTE — CONSULTS
ORTHOPAEDIC CONSULTATION        Patient: Stefano Begum Jr.                   MRN: [id number]         SSN: [id number]  YOB: 1940            AGE: 84 y.o.          SEX: male     Patient scheduled for: Removal intramedullary mia, left femur  Date of surgery:   1/25/2025  Surgical Time: 60 minutes  Consults: Patient's on internal medicine service for orthopedic surgery consultation  Special Equipment: Synthes intramedullary mia TFN removal system  Location of Surgery:   [facility]    Surgeon: Ronald Villarreal MD  ANESTHESIA TYPE:  General       Stefano Begum Jr. Agrees for above procedure.      ASSESSMENT/PLAN                 Stefano Begum Jr. is a 84-year-old male who has a gangrenous left foot and his vascular studies, show severe PAD and he is not unreconstructable, as such vascular surgeon, is planning for left above-knee amputation, left AKA.  Patient was at [facility], on December 5, 2024, where the orthopedic surgery team, was planning to have the hardware removed, left TFN removal, so that would help facilitate the left AKA.  But the family did not give consent for this at that time.      He was at [facility], earlier this month, and informed decision cannot be made yet to go ahead with a left above-knee amputation.  As such she was discharged to home.    He presented to [facility], with a gangrenous left foot, and PAD, and he was transferred here to [facility].  Thus, he has now returned to [facility] and I spoke with the vascular surgeon, and the patient, now currently is consented for a left AKA.      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  Left foot shows extensive soft tissue wound 
[facility] - Consult Note    Cardiology consultation request from Dr. Chen for evaluation and management/treatment of coronary risk stratification for L AKA    Date of  Admission: 1/22/2025  9:16 PM   Primary Care Physician:  Mignon Foster PA    Attending Cardiologist: Dr. Hughes       Assessment:     Patient Active Problem List    Diagnosis Date Noted    PAD (peripheral artery disease) (HCC) 01/23/2025    Delirium 01/03/2025    Gangrene (HCC) 12/27/2024    Peripheral vascular disease, unspecified (HCC) 12/27/2024    Gangrene of foot (HCC) 12/27/2024    Osteomyelitis of left lower extremity 12/24/2024    Peripheral arterial disease (HCC) 12/24/2024    History of atrial fibrillation 12/24/2024    BPH (benign prostatic hyperplasia) 12/24/2024    Chronic CHF (congestive heart failure) ([facility]) 12/24/2024    Hypothyroidism 12/24/2024    Encounter for palliative care 12/24/2024    Goals of care, counseling/discussion 12/24/2024    Moderate protein-calorie malnutrition (HCC) 12/24/2024    Dry gangrene ([facility]) 12/23/2024    Cellulitis 10/06/2018    Gastrointestinal hemorrhage 05/28/2018    Essential hypertension 06/12/2015    Primary osteoarthritis of both knees 06/12/2015    Leg weakness, bilateral 06/12/2015    Depression 12/20/2014    Hyperlipidemia 12/20/2014    Elevated PSA measurement 12/20/2014    Pre-diabetes 12/20/2014       - Preoperative left AKA evaluation, moderate risk from cardiac standpoint  - Left foot gangrene with osteomyelitis to the first through fifth phalanges. Ongoing evaluation for left AKA  - Severe PAD   - Nuclear stress test done at [facility] 10/2024 revealed fixed apical and inferior defects. No evidence of ischemia. 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   - Echo 12/24/24 EF of 50-55%, hypokinesis in basal inferolateral and mid inferolateral segments.   - Valvular disease. Mild AI, Mild to moderate MR on 
[facility]       History:   83 y/o M with hx of EVAR in Oct 2024, transferred to [facility] yesterday from [facility] for progressive LLE ischemia and a thrombosed L pop artery aneurysm. He was seen by our practice during hospitalization at [facility] approx 1 month ago for LLE forefoot gangrene. He was scheduled for LLE AKA by Dr. Ewing with concurrent L femur IM mia removal by Dr. Villarreal.   Due to patient intermittent confusion, there was quite a bit of discussion regarding the patient's capacity to make decisions regarding the operation. The operation was cancelled and the plan was for capacity to be determined. He was ultimately discharged 2 weeks later, which was approx 2 weeks ago.     Currently, the pt notes that his L foot is \"rotting away\" and he wants to undergo an amputation. He indicates that he would be interested in a prosthetic. He stated that his life will change quite a bit after an amputation, but he cannot continue to live as he is currently. He stated that he is not sure how his family will react to him having an amputation. He has a hx of a R TMA in the remote past.       Past Medical History:   Diagnosis Date    BPH (benign prostatic hyperplasia) 12/24/2024    Chronic CHF (congestive heart failure) (HCC) 12/24/2024    Depression     History of atrial fibrillation 12/24/2024    Hyperlipidemia     Hypertension     Hypothyroidism 12/24/2024    Peripheral arterial disease (HCC) 12/24/2024    Peripheral arterial disease (HCC)      Past Surgical History:   Procedure Laterality Date    FOOT AMPUTATION THROUGH METATARSAL Left     Left foot all toes amputated     No current facility-administered medications on file prior to encounter.     Current Outpatient Medications on File Prior to Encounter   Medication Sig Dispense Refill    lactobacillus (CULTURELLE) capsule Take 1 capsule by mouth daily (with breakfast) 60 capsule 0    thiamine mononitrate (THIAMINE) 100 MG tablet 
Infectious Disease Consultation Note        Reason: left foot infection     Current abx Prior abx    Ciprofloxacin, amoxicillin/clavulanate     Lines:       Assessment :  84-year-old male past medical history of severe peripheral arterial disease, dry gangrene of the left foot, status post TMA amputation of the right foot sent to [facility] on 1/22/25 from [facility] for evaluation of left lower extremity.    Clinical presentation c/w chronic osteomyelitis left first through fifth phalanges, left fifth MTP joint in setting of several peripheral arterial disease, left foot dry gangrene     Wound cx 12/24/24-mixed gnr  Blood culture 12/23/24- no growth     + foul smell left foot suggestive of superimposed infection. Infection seems to have remain localized to the left foot due to severe peripheral arterial disease      Patient wishes to have higher level of amputation LLE     Thrombocytopenia- ?  Medication induced     Recommendations:     Monitor off antibiotics  Obtain psych evaluation to determine patient's decision-making capacity.  Recommend to proceed with left AKA per patient's wishes if felt to be competent to make decisions per psych   follow-up Ortho surgery, vascular surgery recommendations regarding left AKA  Monitor left foot for wet necrosis     Above plan was discussed in details with dr. Chen. Please call me if any further questions or concerns. Will continue to participate in the care of this patient.      Thank you for consultation request. Above plan was discussed in details with patient, and dr Chen. Please call me if any further questions or concerns. Will continue to participate in the care of this patient.    HPI:    84-year-old male past medical history of severe peripheral arterial disease, dry gangrene of the left foot, status post TMA amputation of the right foot sent to [facility] on 1/22/25 from [facility] for evaluation of left lower extremity.    Patient is known to me from prior inpatient 
    Patient Vitals for the past 8 hrs:   Pulse Height   25 1700 76 --   25 1530 78 --   25 1300 71 --   25 1226 -- 1.778 m (5' 10\")     Temp (24hrs), Av.6 °F (36.4 °C), Min:97.5 °F (36.4 °C), Max:97.8 °F (36.6 °C)      Bilateral lower extremity physical exam:    Vascular: Non palpable pedal pulses bilateral. CFT is less than 3 seconds to TMA stump on right foot.     Dermatological: Left foot extensive gangrene from proximal midfoot medially to laterally to fifth MTPJ. All toes are necrotic with dry eschar. Malodor present. No erythema or edema noted. Clear demarcation present.     Neurological: Protective sensation is diminished to forefoot bilateral.     Musculoskeletal: Muscle strength is diminished to bilateral LE's.         Impression:     Left foot extensive gangrene with necrosis, severe PAD.       Recommendation:     - Patient's left foot appear non-salvageable. He is also being seen by vascular surgery who recommended AKA. Patient is also agreeable to proceed with AKA.   - Remain NWB to left foot.   - Antibiotics per ID recommendation.   - I'm signing off at this time. If further assistance needed please reach out to me.     - Thank you for allowing me the opportunity to participate in Mr. Begum's care.   
he can live another 5 years.  He did know that he would need to go into rehabilitation for a period of time when he got the surgery done.    Assessment: Capacity evaluation.  At the current time the patient does have the capacity to make decisions regarding his own medical legal and financial affairs.  He is voicing a desire to get the surgery done and has an understanding of this.    Time spent: 5 minutes discussing with nurse after evaluation, 10-minute chart review, 20 minutes face-to-face, 15-minute charting.

## 2025-01-24 NOTE — CARE COORDINATION
Clinicals uploaded to [facility]       Patient will need auth for SNF.             Shawnee Mancera RN  Case Management [phone]

## 2025-01-25 ENCOUNTER — APPOINTMENT (OUTPATIENT)
Facility: HOSPITAL | Age: 85
DRG: 239 | End: 2025-01-25
Attending: STUDENT IN AN ORGANIZED HEALTH CARE EDUCATION/TRAINING PROGRAM
Payer: MEDICARE

## 2025-01-25 ENCOUNTER — ANESTHESIA (OUTPATIENT)
Facility: HOSPITAL | Age: 85
End: 2025-01-25
Payer: MEDICARE

## 2025-01-25 PROBLEM — Z97.8 ORTHOPEDIC HARDWARE PRESENT: Status: ACTIVE | Noted: 2025-01-25

## 2025-01-25 LAB
ANION GAP SERPL CALC-SCNC: 2 MMOL/L (ref 3–18)
BASOPHILS # BLD: 0.03 K/UL (ref 0–0.1)
BASOPHILS NFR BLD: 0.3 % (ref 0–2)
BUN SERPL-MCNC: 16 MG/DL (ref 7–18)
BUN/CREAT SERPL: 24 (ref 12–20)
CALCIUM SERPL-MCNC: 8.2 MG/DL (ref 8.5–10.1)
CHLORIDE SERPL-SCNC: 110 MMOL/L (ref 100–111)
CO2 SERPL-SCNC: 28 MMOL/L (ref 21–32)
CREAT SERPL-MCNC: 0.68 MG/DL (ref 0.6–1.3)
DIFFERENTIAL METHOD BLD: ABNORMAL
EOSINOPHIL # BLD: 0.03 K/UL (ref 0–0.4)
EOSINOPHIL NFR BLD: 0.3 % (ref 0–5)
ERYTHROCYTE [DISTWIDTH] IN BLOOD BY AUTOMATED COUNT: 18.1 % (ref 11.6–14.5)
GLUCOSE SERPL-MCNC: 88 MG/DL (ref 74–99)
HCT VFR BLD AUTO: 38.1 % (ref 36–48)
HGB BLD-MCNC: 11.7 G/DL (ref 13–16)
IMM GRANULOCYTES # BLD AUTO: 0.11 K/UL (ref 0–0.04)
IMM GRANULOCYTES NFR BLD AUTO: 1.2 % (ref 0–0.5)
LYMPHOCYTES # BLD: 1.19 K/UL (ref 0.9–3.6)
LYMPHOCYTES NFR BLD: 13.2 % (ref 21–52)
MCH RBC QN AUTO: 27.3 PG (ref 24–34)
MCHC RBC AUTO-ENTMCNC: 30.7 G/DL (ref 31–37)
MCV RBC AUTO: 88.8 FL (ref 78–100)
MONOCYTES # BLD: 0.6 K/UL (ref 0.05–1.2)
MONOCYTES NFR BLD: 6.6 % (ref 3–10)
NEUTS SEG # BLD: 7.08 K/UL (ref 1.8–8)
NEUTS SEG NFR BLD: 78.4 % (ref 40–73)
NRBC # BLD: 0 K/UL (ref 0–0.01)
NRBC BLD-RTO: 0 PER 100 WBC
PLATELET # BLD AUTO: 102 K/UL (ref 135–420)
PMV BLD AUTO: 10.8 FL (ref 9.2–11.8)
POTASSIUM SERPL-SCNC: 3.6 MMOL/L (ref 3.5–5.5)
RBC # BLD AUTO: 4.29 M/UL (ref 4.35–5.65)
SODIUM SERPL-SCNC: 140 MMOL/L (ref 136–145)
WBC # BLD AUTO: 9 K/UL (ref 4.6–13.2)

## 2025-01-25 PROCEDURE — 0QP934Z REMOVAL OF INTERNAL FIXATION DEVICE FROM LEFT FEMORAL SHAFT, PERCUTANEOUS APPROACH: ICD-10-PCS | Performed by: ORTHOPAEDIC SURGERY

## 2025-01-25 PROCEDURE — 2580000003 HC RX 258: Performed by: STUDENT IN AN ORGANIZED HEALTH CARE EDUCATION/TRAINING PROGRAM

## 2025-01-25 PROCEDURE — 6360000002 HC RX W HCPCS: Performed by: ANESTHESIOLOGY

## 2025-01-25 PROCEDURE — 80048 BASIC METABOLIC PNL TOTAL CA: CPT

## 2025-01-25 PROCEDURE — 6370000000 HC RX 637 (ALT 250 FOR IP): Performed by: STUDENT IN AN ORGANIZED HEALTH CARE EDUCATION/TRAINING PROGRAM

## 2025-01-25 PROCEDURE — 3700000001 HC ADD 15 MINUTES (ANESTHESIA): Performed by: ORTHOPAEDIC SURGERY

## 2025-01-25 PROCEDURE — 0Y6D0Z3 DETACHMENT AT LEFT UPPER LEG, LOW, OPEN APPROACH: ICD-10-PCS | Performed by: SURGERY

## 2025-01-25 PROCEDURE — 6360000002 HC RX W HCPCS: Performed by: NURSE ANESTHETIST, CERTIFIED REGISTERED

## 2025-01-25 PROCEDURE — 2580000003 HC RX 258: Performed by: ANESTHESIOLOGY

## 2025-01-25 PROCEDURE — 3600000012 HC SURGERY LEVEL 2 ADDTL 15MIN: Performed by: ORTHOPAEDIC SURGERY

## 2025-01-25 PROCEDURE — 2580000003 HC RX 258: Performed by: INTERNAL MEDICINE

## 2025-01-25 PROCEDURE — 94761 N-INVAS EAR/PLS OXIMETRY MLT: CPT

## 2025-01-25 PROCEDURE — 99233 SBSQ HOSP IP/OBS HIGH 50: CPT | Performed by: STUDENT IN AN ORGANIZED HEALTH CARE EDUCATION/TRAINING PROGRAM

## 2025-01-25 PROCEDURE — 88307 TISSUE EXAM BY PATHOLOGIST: CPT

## 2025-01-25 PROCEDURE — 6360000002 HC RX W HCPCS

## 2025-01-25 PROCEDURE — 3E0T3BZ INTRODUCTION OF ANESTHETIC AGENT INTO PERIPHERAL NERVES AND PLEXI, PERCUTANEOUS APPROACH: ICD-10-PCS | Performed by: ANESTHESIOLOGY

## 2025-01-25 PROCEDURE — 3600000002 HC SURGERY LEVEL 2 BASE: Performed by: ORTHOPAEDIC SURGERY

## 2025-01-25 PROCEDURE — 36415 COLL VENOUS BLD VENIPUNCTURE: CPT

## 2025-01-25 PROCEDURE — 7100000000 HC PACU RECOVERY - FIRST 15 MIN: Performed by: ORTHOPAEDIC SURGERY

## 2025-01-25 PROCEDURE — 7100000001 HC PACU RECOVERY - ADDTL 15 MIN: Performed by: ORTHOPAEDIC SURGERY

## 2025-01-25 PROCEDURE — 64447 NJX AA&/STRD FEMORAL NRV IMG: CPT | Performed by: ANESTHESIOLOGY

## 2025-01-25 PROCEDURE — 6360000002 HC RX W HCPCS: Performed by: INTERNAL MEDICINE

## 2025-01-25 PROCEDURE — 73552 X-RAY EXAM OF FEMUR 2/>: CPT

## 2025-01-25 PROCEDURE — 20680 REMOVAL OF IMPLANT DEEP: CPT | Performed by: ORTHOPAEDIC SURGERY

## 2025-01-25 PROCEDURE — 2500000003 HC RX 250 WO HCPCS: Performed by: NURSE ANESTHETIST, CERTIFIED REGISTERED

## 2025-01-25 PROCEDURE — 2500000003 HC RX 250 WO HCPCS: Performed by: STUDENT IN AN ORGANIZED HEALTH CARE EDUCATION/TRAINING PROGRAM

## 2025-01-25 PROCEDURE — 2709999900 HC NON-CHARGEABLE SUPPLY: Performed by: ORTHOPAEDIC SURGERY

## 2025-01-25 PROCEDURE — 2720000010 HC SURG SUPPLY STERILE: Performed by: ORTHOPAEDIC SURGERY

## 2025-01-25 PROCEDURE — 2700000000 HC OXYGEN THERAPY PER DAY

## 2025-01-25 PROCEDURE — P9045 ALBUMIN (HUMAN), 5%, 250 ML: HCPCS | Performed by: NURSE ANESTHETIST, CERTIFIED REGISTERED

## 2025-01-25 PROCEDURE — 1100000000 HC RM PRIVATE

## 2025-01-25 PROCEDURE — 3700000000 HC ANESTHESIA ATTENDED CARE: Performed by: ORTHOPAEDIC SURGERY

## 2025-01-25 PROCEDURE — 88311 DECALCIFY TISSUE: CPT

## 2025-01-25 PROCEDURE — 2580000003 HC RX 258: Performed by: NURSE ANESTHETIST, CERTIFIED REGISTERED

## 2025-01-25 PROCEDURE — 85025 COMPLETE CBC W/AUTO DIFF WBC: CPT

## 2025-01-25 RX ORDER — NALOXONE HYDROCHLORIDE 0.4 MG/ML
INJECTION, SOLUTION INTRAMUSCULAR; INTRAVENOUS; SUBCUTANEOUS PRN
Status: DISCONTINUED | OUTPATIENT
Start: 2025-01-25 | End: 2025-01-25 | Stop reason: HOSPADM

## 2025-01-25 RX ORDER — SODIUM CHLORIDE 9 MG/ML
INJECTION, SOLUTION INTRAVENOUS
Status: DISCONTINUED | OUTPATIENT
Start: 2025-01-25 | End: 2025-01-25 | Stop reason: SDUPTHER

## 2025-01-25 RX ORDER — SODIUM CHLORIDE 9 MG/ML
INJECTION, SOLUTION INTRAVENOUS PRN
Status: DISCONTINUED | OUTPATIENT
Start: 2025-01-25 | End: 2025-01-25

## 2025-01-25 RX ORDER — PROPOFOL 10 MG/ML
INJECTION, EMULSION INTRAVENOUS
Status: DISCONTINUED | OUTPATIENT
Start: 2025-01-25 | End: 2025-01-25 | Stop reason: SDUPTHER

## 2025-01-25 RX ORDER — SUCCINYLCHOLINE/SOD CL,ISO/PF 100 MG/5ML
SYRINGE (ML) INTRAVENOUS
Status: DISCONTINUED | OUTPATIENT
Start: 2025-01-25 | End: 2025-01-25 | Stop reason: SDUPTHER

## 2025-01-25 RX ORDER — LIDOCAINE HYDROCHLORIDE 10 MG/ML
INJECTION, SOLUTION EPIDURAL; INFILTRATION; INTRACAUDAL; PERINEURAL
Status: COMPLETED
Start: 2025-01-25 | End: 2025-01-25

## 2025-01-25 RX ORDER — FENTANYL CITRATE 50 UG/ML
INJECTION, SOLUTION INTRAMUSCULAR; INTRAVENOUS
Status: DISCONTINUED | OUTPATIENT
Start: 2025-01-25 | End: 2025-01-25 | Stop reason: SDUPTHER

## 2025-01-25 RX ORDER — CEFAZOLIN SODIUM 1 G/3ML
INJECTION, POWDER, FOR SOLUTION INTRAMUSCULAR; INTRAVENOUS
Status: DISCONTINUED | OUTPATIENT
Start: 2025-01-25 | End: 2025-01-25 | Stop reason: SDUPTHER

## 2025-01-25 RX ORDER — VASOPRESSIN 20 [USP'U]/ML
INJECTION, SOLUTION INTRAVENOUS
Status: DISCONTINUED | OUTPATIENT
Start: 2025-01-25 | End: 2025-01-25 | Stop reason: SDUPTHER

## 2025-01-25 RX ORDER — ROPIVACAINE HYDROCHLORIDE 5 MG/ML
30 INJECTION, SOLUTION EPIDURAL; INFILTRATION; PERINEURAL ONCE
Status: CANCELLED | OUTPATIENT
Start: 2025-01-25 | End: 2025-01-25

## 2025-01-25 RX ORDER — ROPIVACAINE HYDROCHLORIDE 5 MG/ML
INJECTION, SOLUTION EPIDURAL; INFILTRATION; PERINEURAL
Status: COMPLETED
Start: 2025-01-25 | End: 2025-01-25

## 2025-01-25 RX ORDER — PHENYLEPHRINE HCL IN 0.9% NACL 1 MG/10 ML
SYRINGE (ML) INTRAVENOUS
Status: DISCONTINUED | OUTPATIENT
Start: 2025-01-25 | End: 2025-01-25 | Stop reason: SDUPTHER

## 2025-01-25 RX ORDER — ONDANSETRON 2 MG/ML
4 INJECTION INTRAMUSCULAR; INTRAVENOUS
Status: DISCONTINUED | OUTPATIENT
Start: 2025-01-25 | End: 2025-01-25 | Stop reason: HOSPADM

## 2025-01-25 RX ORDER — SODIUM CHLORIDE 0.9 % (FLUSH) 0.9 %
5-40 SYRINGE (ML) INJECTION EVERY 12 HOURS SCHEDULED
Status: DISCONTINUED | OUTPATIENT
Start: 2025-01-25 | End: 2025-01-25 | Stop reason: HOSPADM

## 2025-01-25 RX ORDER — SODIUM CHLORIDE, SODIUM LACTATE, POTASSIUM CHLORIDE, AND CALCIUM CHLORIDE .6; .31; .03; .02 G/100ML; G/100ML; G/100ML; G/100ML
500 INJECTION, SOLUTION INTRAVENOUS ONCE
Status: COMPLETED | OUTPATIENT
Start: 2025-01-25 | End: 2025-01-25

## 2025-01-25 RX ORDER — DEXAMETHASONE SODIUM PHOSPHATE 4 MG/ML
INJECTION, SOLUTION INTRA-ARTICULAR; INTRALESIONAL; INTRAMUSCULAR; INTRAVENOUS; SOFT TISSUE
Status: DISCONTINUED | OUTPATIENT
Start: 2025-01-25 | End: 2025-01-25 | Stop reason: SDUPTHER

## 2025-01-25 RX ORDER — ALBUMIN HUMAN 50 G/1000ML
SOLUTION INTRAVENOUS
Status: DISCONTINUED | OUTPATIENT
Start: 2025-01-25 | End: 2025-01-25 | Stop reason: SDUPTHER

## 2025-01-25 RX ORDER — ROPIVACAINE HYDROCHLORIDE 5 MG/ML
INJECTION, SOLUTION EPIDURAL; INFILTRATION; PERINEURAL
Status: COMPLETED | OUTPATIENT
Start: 2025-01-25 | End: 2025-01-25

## 2025-01-25 RX ORDER — SODIUM CHLORIDE 9 MG/ML
INJECTION, SOLUTION INTRAVENOUS PRN
Status: DISCONTINUED | OUTPATIENT
Start: 2025-01-25 | End: 2025-01-25 | Stop reason: HOSPADM

## 2025-01-25 RX ORDER — LIDOCAINE HYDROCHLORIDE 10 MG/ML
5 INJECTION, SOLUTION INFILTRATION; PERINEURAL ONCE
Status: CANCELLED | OUTPATIENT
Start: 2025-01-25 | End: 2025-01-25

## 2025-01-25 RX ORDER — MIDAZOLAM HYDROCHLORIDE 1 MG/ML
INJECTION, SOLUTION INTRAMUSCULAR; INTRAVENOUS
Status: DISCONTINUED | OUTPATIENT
Start: 2025-01-25 | End: 2025-01-25 | Stop reason: SDUPTHER

## 2025-01-25 RX ORDER — SODIUM CHLORIDE 0.9 % (FLUSH) 0.9 %
5-40 SYRINGE (ML) INJECTION PRN
Status: DISCONTINUED | OUTPATIENT
Start: 2025-01-25 | End: 2025-01-25 | Stop reason: HOSPADM

## 2025-01-25 RX ORDER — LIDOCAINE HYDROCHLORIDE 20 MG/ML
INJECTION, SOLUTION EPIDURAL; INFILTRATION; INTRACAUDAL; PERINEURAL
Status: DISCONTINUED | OUTPATIENT
Start: 2025-01-25 | End: 2025-01-25 | Stop reason: SDUPTHER

## 2025-01-25 RX ORDER — ROCURONIUM BROMIDE 10 MG/ML
INJECTION, SOLUTION INTRAVENOUS
Status: DISCONTINUED | OUTPATIENT
Start: 2025-01-25 | End: 2025-01-25 | Stop reason: SDUPTHER

## 2025-01-25 RX ORDER — DIPHENHYDRAMINE HYDROCHLORIDE 50 MG/ML
12.5 INJECTION INTRAMUSCULAR; INTRAVENOUS
Status: DISCONTINUED | OUTPATIENT
Start: 2025-01-25 | End: 2025-01-25 | Stop reason: HOSPADM

## 2025-01-25 RX ORDER — EPHEDRINE SULFATE 50 MG/ML
INJECTION INTRAVENOUS
Status: DISCONTINUED | OUTPATIENT
Start: 2025-01-25 | End: 2025-01-25 | Stop reason: SDUPTHER

## 2025-01-25 RX ORDER — ONDANSETRON 2 MG/ML
INJECTION INTRAMUSCULAR; INTRAVENOUS
Status: DISCONTINUED | OUTPATIENT
Start: 2025-01-25 | End: 2025-01-25 | Stop reason: SDUPTHER

## 2025-01-25 RX ADMIN — LEVOTHYROXINE SODIUM 50 MCG: 0.05 TABLET ORAL at 05:23

## 2025-01-25 RX ADMIN — FENTANYL CITRATE 50 MCG: 50 INJECTION INTRAMUSCULAR; INTRAVENOUS at 07:47

## 2025-01-25 RX ADMIN — VASOPRESSIN 2 UNITS: 20 INJECTION INTRAVENOUS at 09:19

## 2025-01-25 RX ADMIN — ROSUVASTATIN CALCIUM 40 MG: 20 TABLET, FILM COATED ORAL at 17:54

## 2025-01-25 RX ADMIN — ALBUMIN (HUMAN) 250 ML: 12.5 SOLUTION INTRAVENOUS at 09:43

## 2025-01-25 RX ADMIN — ROCURONIUM BROMIDE 10 MG: 10 INJECTION, SOLUTION INTRAVENOUS at 11:07

## 2025-01-25 RX ADMIN — PROPOFOL 20 MG: 10 INJECTION, EMULSION INTRAVENOUS at 08:09

## 2025-01-25 RX ADMIN — PIPERACILLIN AND TAZOBACTAM 3375 MG: 3; .375 INJECTION, POWDER, LYOPHILIZED, FOR SOLUTION INTRAVENOUS at 20:44

## 2025-01-25 RX ADMIN — THERA TABS 1 TABLET: TAB at 17:54

## 2025-01-25 RX ADMIN — SODIUM CHLORIDE: 9 INJECTION, SOLUTION INTRAVENOUS at 10:46

## 2025-01-25 RX ADMIN — SODIUM CHLORIDE, PRESERVATIVE FREE 10 ML: 5 INJECTION INTRAVENOUS at 15:34

## 2025-01-25 RX ADMIN — LIDOCAINE HYDROCHLORIDE 20 MG: 20 INJECTION, SOLUTION EPIDURAL; INFILTRATION; INTRACAUDAL; PERINEURAL at 08:09

## 2025-01-25 RX ADMIN — Medication 100 MCG: at 08:09

## 2025-01-25 RX ADMIN — MIDAZOLAM 1 MG: 1 INJECTION, SOLUTION INTRAMUSCULAR; INTRAVENOUS at 08:09

## 2025-01-25 RX ADMIN — TRAZODONE HYDROCHLORIDE 50 MG: 50 TABLET ORAL at 20:38

## 2025-01-25 RX ADMIN — EPHEDRINE SULFATE 10 MG: 50 INJECTION INTRAVENOUS at 10:31

## 2025-01-25 RX ADMIN — PIPERACILLIN AND TAZOBACTAM 3375 MG: 3; .375 INJECTION, POWDER, LYOPHILIZED, FOR SOLUTION INTRAVENOUS at 14:09

## 2025-01-25 RX ADMIN — PIPERACILLIN AND TAZOBACTAM 3375 MG: 3; .375 INJECTION, POWDER, LYOPHILIZED, FOR SOLUTION INTRAVENOUS at 04:42

## 2025-01-25 RX ADMIN — MIDAZOLAM 1 MG: 1 INJECTION, SOLUTION INTRAMUSCULAR; INTRAVENOUS at 07:47

## 2025-01-25 RX ADMIN — ROCURONIUM BROMIDE 20 MG: 10 INJECTION, SOLUTION INTRAVENOUS at 08:25

## 2025-01-25 RX ADMIN — ROPIVACAINE HYDROCHLORIDE 30 ML: 5 INJECTION EPIDURAL; INFILTRATION; PERINEURAL at 07:45

## 2025-01-25 RX ADMIN — ROPIVACAINE HYDROCHLORIDE 150 MG: 5 INJECTION EPIDURAL; INFILTRATION; PERINEURAL at 07:56

## 2025-01-25 RX ADMIN — FENTANYL CITRATE 50 MCG: 50 INJECTION INTRAMUSCULAR; INTRAVENOUS at 08:09

## 2025-01-25 RX ADMIN — OXYCODONE HYDROCHLORIDE 5 MG: 5 TABLET ORAL at 20:38

## 2025-01-25 RX ADMIN — Medication 200 MCG: at 08:18

## 2025-01-25 RX ADMIN — EPHEDRINE SULFATE 10 MG: 50 INJECTION INTRAVENOUS at 10:51

## 2025-01-25 RX ADMIN — SODIUM CHLORIDE: 9 INJECTION, SOLUTION INTRAVENOUS at 07:49

## 2025-01-25 RX ADMIN — Medication 100 MG: at 08:09

## 2025-01-25 RX ADMIN — SODIUM CHLORIDE: 9 INJECTION, SOLUTION INTRAVENOUS at 00:46

## 2025-01-25 RX ADMIN — SODIUM CHLORIDE, POTASSIUM CHLORIDE, SODIUM LACTATE AND CALCIUM CHLORIDE 500 ML: 600; 310; 30; 20 INJECTION, SOLUTION INTRAVENOUS at 12:20

## 2025-01-25 RX ADMIN — Medication 200 MCG: at 08:37

## 2025-01-25 RX ADMIN — VASOPRESSIN 2 UNITS: 20 INJECTION INTRAVENOUS at 08:21

## 2025-01-25 RX ADMIN — ONDANSETRON 4 MG: 2 INJECTION, SOLUTION INTRAMUSCULAR; INTRAVENOUS at 08:09

## 2025-01-25 RX ADMIN — ROCURONIUM BROMIDE 10 MG: 10 INJECTION, SOLUTION INTRAVENOUS at 08:09

## 2025-01-25 RX ADMIN — LIDOCAINE HYDROCHLORIDE 5 ML: 10 INJECTION, SOLUTION EPIDURAL; INFILTRATION; INTRACAUDAL; PERINEURAL at 07:45

## 2025-01-25 RX ADMIN — SUGAMMADEX 200 MG: 100 INJECTION, SOLUTION INTRAVENOUS at 11:42

## 2025-01-25 RX ADMIN — THIAMINE HCL TAB 100 MG 100 MG: 100 TAB at 17:54

## 2025-01-25 RX ADMIN — OXYCODONE HYDROCHLORIDE 5 MG: 5 TABLET ORAL at 05:23

## 2025-01-25 RX ADMIN — DEXAMETHASONE SODIUM PHOSPHATE 4 MG: 4 INJECTION INTRA-ARTICULAR; INTRALESIONAL; INTRAMUSCULAR; INTRAVENOUS; SOFT TISSUE at 08:09

## 2025-01-25 RX ADMIN — CEFAZOLIN 2 G: 1 INJECTION, POWDER, FOR SOLUTION INTRAMUSCULAR; INTRAVENOUS at 08:16

## 2025-01-25 RX ADMIN — VASOPRESSIN 2 UNITS: 20 INJECTION INTRAVENOUS at 09:04

## 2025-01-25 ASSESSMENT — PAIN DESCRIPTION - LOCATION
LOCATION: FOOT;LEG
LOCATION: LEG

## 2025-01-25 ASSESSMENT — PAIN DESCRIPTION - DESCRIPTORS
DESCRIPTORS: ACHING

## 2025-01-25 ASSESSMENT — PAIN SCALES - GENERAL
PAINLEVEL_OUTOF10: 7
PAINLEVEL_OUTOF10: 7
PAINLEVEL_OUTOF10: 0
PAINLEVEL_OUTOF10: 10
PAINLEVEL_OUTOF10: 0

## 2025-01-25 ASSESSMENT — PAIN - FUNCTIONAL ASSESSMENT
PAIN_FUNCTIONAL_ASSESSMENT: PREVENTS OR INTERFERES SOME ACTIVE ACTIVITIES AND ADLS

## 2025-01-25 ASSESSMENT — PAIN DESCRIPTION - PAIN TYPE
TYPE: ACUTE PAIN
TYPE: SURGICAL PAIN

## 2025-01-25 ASSESSMENT — PAIN DESCRIPTION - ORIENTATION
ORIENTATION: RIGHT;LEFT
ORIENTATION: LEFT
ORIENTATION: RIGHT;LEFT

## 2025-01-25 NOTE — ANESTHESIA POSTPROCEDURE EVALUATION
Department of Anesthesiology  Postprocedure Note    Patient: Stefano Begum Jr.  MRN: [id number]  YOB: 1940  Date of evaluation: 1/25/2025    Procedure Summary       Date: 01/25/25 Room / Location: [facility] MAIN 07 / [facility] MAIN OR    Anesthesia Start: 0801 Anesthesia Stop: 1211    Procedures:       LEFT TROCHANTERIC FEMORAL NAIL HARDWARE REMOVAL; C-ARM; [DEPUY SYNTHES ORTHO] (Left: Thigh)      LEFT LEG ABOVE KNEE AMPUTATION (Left: Leg Lower) Diagnosis:       PAD (peripheral artery disease) ([facility])      (PAD (peripheral artery disease) ([facility]) [I73.9])    Surgeons: Ronald Villarreal MD; Larry Miller MD Responsible Provider: Nilton North MD    Anesthesia Type: Regional, General ASA Status: 3            Anesthesia Type: Regional, General    Danny Phase I: Danny Score: 8    Danny Phase II:      Anesthesia Post Evaluation    Patient location during evaluation: bedside  Patient participation: complete - patient participated  Level of consciousness: awake  Pain score: 2  Airway patency: patent  Nausea & Vomiting: no vomiting and no nausea  Cardiovascular status: blood pressure returned to baseline  Respiratory status: acceptable  Hydration status: stable  Pain management: adequate    No notable events documented.

## 2025-01-25 NOTE — ANESTHESIA PRE PROCEDURE
Department of Anesthesiology  Preprocedure Note       Name:  Stefano Begum Jr.   Age:  84 y.o.  :  1940                                          MRN:  [id number]         Date:  2025      Surgeon: Surgeon(s):  Ronald Villarreal MD Dayes, Nathanael, MD    Procedure: Procedure(s):  LEFT TROCHANTERIC FEMORAL NAIL HARDWARE REMOVAL; C-ARM; [DEPUY SYNTHES ORTHO]  LEFT LEG ABOVE KNEE AMPUTATION    Medications prior to admission:   Prior to Admission medications    Medication Sig Start Date End Date Taking? Authorizing Provider   lactobacillus (CULTURELLE) capsule Take 1 capsule by mouth daily (with breakfast) 25  Yes Robb Marcelo MD   thiamine mononitrate (THIAMINE) 100 MG tablet Take 1 tablet by mouth daily 25  Yes Robb Marcelo MD   atenolol (TENORMIN) 50 MG tablet Take 1 tablet by mouth daily   Yes Provider, MD Rita   pantoprazole (PROTONIX) 40 MG tablet Take 1 tablet by mouth as needed (daily prn)   Yes Provider, MD Rita   rosuvastatin (CRESTOR) 40 MG tablet Take 1 tablet by mouth every evening   Yes Provider, Historical, MD   traZODone (DESYREL) 50 MG tablet Take 1 tablet by mouth nightly   Yes Provider, Historical, MD   sertraline (ZOLOFT) 50 MG tablet Take 1 tablet by mouth daily   Yes Provider, MD Rita   levothyroxine (SYNTHROID) 50 MCG tablet Take 1 tablet by mouth Daily   Yes Provider, Historical, MD   Multiple Vitamin (MULTIVITAMIN) TABS tablet Take 1 tablet by mouth daily 25   Robb Marcelo MD       Current medications:    Current Facility-Administered Medications   Medication Dose Route Frequency Provider Last Rate Last Admin   • [Held by provider] heparin (porcine) injection 5,000 Units  5,000 Units SubCUTAneous 3 times per day Enrique Chen,        • piperacillin-tazobactam (ZOSYN) 3,375 mg in sodium chloride 0.9 % 50 mL IVPB (mini-bag)  3,375 mg IntraVENous Q8H Natalya Palmer MD 12.5 mL/hr at 25 0442 3,375 mg at 25 0442   •

## 2025-01-25 NOTE — BRIEF OP NOTE
Brief Postoperative Note      Patient: Stefano Begum Jr.  YOB: 1940  MRN: [id number]    Date of Procedure: 1/25/2025    Pre-Op Diagnosis Codes:      * PAD (peripheral artery disease) ([facility]) [I73.9]    Post-Op Diagnosis: Same       Procedure(s):    LEFT LEG ABOVE KNEE AMPUTATION    Surgeon(s):  Larry Miller MD    Assistant:  Surgical Assistant: Ruby Jurado    Anesthesia: General    Estimated Blood Loss (mL): 100 mL    Complications: None    Specimens:   ID Type Source Tests Collected by Time Destination   A : left leg aka Tissue Leg SURGICAL PATHOLOGY Ronald Villarreal MD 1/25/2025 1125        Implants:  * No implants in log *      Drains:   Urinary Catheter 01/25/25 2 Way;Collins (Active)       [REMOVED] External Urinary Catheter (Removed)   Site Assessment Clean,dry & intact 01/08/25 0748   Placement Replaced 01/08/25 0748   Securement Method Tape 01/07/25 1701   Catheter Care Catheter/Wick replaced 01/08/25 0748   Perineal Care Yes 01/08/25 0748   Suction 40 mmgHg continuous 01/08/25 0748   Urine Color Yellow 01/08/25 0748   Urine Appearance Clear 01/08/25 0748   Urine Odor Other (Comment) 01/07/25 1701   Output (mL) 400 mL 01/08/25 0748       Findings:  Infection Present At Time Of Surgery (PATOS) (choose all levels that have infection present):  No infection present  Other Findings: viable flaps present at closure     Electronically signed by Larry Miller MD on 1/25/2025 at 12:02 PM

## 2025-01-25 NOTE — PERIOP NOTE
Received telephone report from Tara RN on pt assigned to bed #504. A vinson will be placed once in OR per report.

## 2025-01-25 NOTE — PERIOP NOTE
Bedside timeout performed for block (post-op pain) with Can COLLIER and Ellen PATEL assisting. 30ml 0.5% Ropivacaine given. 1% Xylocaine injected for numbing site, left leg.

## 2025-01-25 NOTE — OP NOTE
Operative Note      Patient: Stefano Begum Jr.  YOB: 1940  MRN: [id number]    Date of Procedure: 1/25/2025    Pre-Op Diagnosis Codes:      * PAD (peripheral artery disease) ([facility]) [I73.9]    Post-Op Diagnosis: Post-Op Diagnosis Codes:     * PAD (peripheral artery disease) (HCC) [I73.9]       Procedure(s):  LEFT TROCHANTERIC FEMORAL NAIL HARDWARE REMOVAL; C-ARM; [DEPUY SYNTHES ORTHO]    Surgeon(s):  Ronald Villarreal MD Dayes, Nathanael, MD    Assistant:   Surgical Assistant: Ruby Jurado    Anesthesia: General and Regional Block  IV FLUIDS: see anesthesia log    Estimated Blood Loss (mL): less than 50 ml    Complications: None    Specimens:   * No specimens in log *    Implants:  * No implants in log *      Drains:   [REMOVED] External Urinary Catheter (Removed)   Site Assessment Clean,dry & intact 01/08/25 0748   Placement Replaced 01/08/25 0748   Securement Method Tape 01/07/25 1701   Catheter Care Catheter/Wick replaced 01/08/25 0748   Perineal Care Yes 01/08/25 0748   Suction 40 mgh continuous 01/08/25 0748   Urine Color Yellow 01/08/25 0748   Urine Appearance Clear 01/08/25 0748   Urine Odor Other (Comment) 01/07/25 1701   Output (mL) 400 mL 01/08/25 0748       Findings:  Infection Present At Time Of Surgery (PATOS) (choose all levels that have infection present):  No infection present  Other Findings:     Indications: Patient has a necrotic left foot due to severe peripheral arterial vascular disease, has a indwelling left intertrochanteric femoral mia, from a remote femoral fracture which now was confirmed to been healed on x-ray and CT scan.  He is booked for AKA, and needs to have this mia removed, in order to facilitate an above-knee amputation left side.    Detailed Description of Procedure:       Stefano Begum Jr. was taken to the operating room suite 1/25/2025 .  He was carefully positioned on a long fluoroscopic table, general anesthesia was conducted, his left foot was

## 2025-01-25 NOTE — ANESTHESIA PROCEDURE NOTES
Peripheral Block    Patient location during procedure: pre-op  Reason for block: post-op pain management and at surgeon's request  Start time: 1/25/2025 7:45 AM  End time: 1/25/2025 7:58 AM  Staffing  Performed: anesthesiologist   Performed by: Nilton North MD  Authorized by: Nilton North MD    Preanesthetic Checklist  Completed: patient identified, IV checked, site marked, risks and benefits discussed, surgical/procedural consents, equipment checked, pre-op evaluation, timeout performed, anesthesia consent given, oxygen available, monitors applied/VS acknowledged, fire risk safety assessment completed and verbalized and blood product R/B/A discussed and consented  Peripheral Block   Patient position: supine  Prep: ChloraPrep  Provider prep: mask and sterile gloves  Patient monitoring: cardiac monitor, continuous pulse ox, frequent blood pressure checks, IV access, oxygen and responsive to questions  Block type: Femoral  Femoral crease  Laterality: left  Injection technique: single-shot  Guidance: nerve stimulator and ultrasound guided  Infiltration strength: 1 %  Dose: 2 mL    Needle   Needle type: insulated echogenic nerve stimulator needle   Needle gauge: 21 G  Needle localization: nerve stimulator and ultrasound guidance  Needle length: 10 cm  Assessment   Injection assessment: negative aspiration for heme, no paresthesia on injection, local visualized surrounding nerve on ultrasound and no intravascular symptoms  Paresthesia pain: none  Slow fractionated injection: yes  Hemodynamics: stable  Outcomes: uncomplicated and patient tolerated procedure well    Additional Notes  Very cachectic pt. Fem nerve seen but no twitch obtained with stimulator. Local placed empirically by ultrasound guidance lateral to artery.   Medications Administered  ropivacaine (NAROPIN) injection 0.5% - Perineural   30 mL - 1/25/2025 7:45:00 AM

## 2025-01-26 PROBLEM — I99.8 ACUTE LOWER LIMB ISCHEMIA: Status: ACTIVE | Noted: 2025-01-26

## 2025-01-26 LAB
ANION GAP SERPL CALC-SCNC: 4 MMOL/L (ref 3–18)
BASOPHILS # BLD: 0.01 K/UL (ref 0–0.1)
BASOPHILS NFR BLD: 0.1 % (ref 0–2)
BUN SERPL-MCNC: 13 MG/DL (ref 7–18)
BUN/CREAT SERPL: 15 (ref 12–20)
CALCIUM SERPL-MCNC: 8 MG/DL (ref 8.5–10.1)
CHLORIDE SERPL-SCNC: 113 MMOL/L (ref 100–111)
CO2 SERPL-SCNC: 29 MMOL/L (ref 21–32)
CREAT SERPL-MCNC: 0.85 MG/DL (ref 0.6–1.3)
DIFFERENTIAL METHOD BLD: ABNORMAL
EOSINOPHIL # BLD: 0 K/UL (ref 0–0.4)
EOSINOPHIL NFR BLD: 0 % (ref 0–5)
ERYTHROCYTE [DISTWIDTH] IN BLOOD BY AUTOMATED COUNT: 18.2 % (ref 11.6–14.5)
GLUCOSE SERPL-MCNC: 108 MG/DL (ref 74–99)
HCT VFR BLD AUTO: 32.1 % (ref 36–48)
HGB BLD-MCNC: 9.6 G/DL (ref 13–16)
IMM GRANULOCYTES # BLD AUTO: 0.03 K/UL (ref 0–0.04)
IMM GRANULOCYTES NFR BLD AUTO: 0.4 % (ref 0–0.5)
LYMPHOCYTES # BLD: 0.72 K/UL (ref 0.9–3.6)
LYMPHOCYTES NFR BLD: 9.3 % (ref 21–52)
MCH RBC QN AUTO: 27.4 PG (ref 24–34)
MCHC RBC AUTO-ENTMCNC: 29.9 G/DL (ref 31–37)
MCV RBC AUTO: 91.5 FL (ref 78–100)
MONOCYTES # BLD: 0.73 K/UL (ref 0.05–1.2)
MONOCYTES NFR BLD: 9.4 % (ref 3–10)
NEUTS SEG # BLD: 6.26 K/UL (ref 1.8–8)
NEUTS SEG NFR BLD: 80.8 % (ref 40–73)
NRBC # BLD: 0 K/UL (ref 0–0.01)
NRBC BLD-RTO: 0 PER 100 WBC
PLATELET # BLD AUTO: 82 K/UL (ref 135–420)
PMV BLD AUTO: 11.6 FL (ref 9.2–11.8)
POTASSIUM SERPL-SCNC: 3.9 MMOL/L (ref 3.5–5.5)
RBC # BLD AUTO: 3.51 M/UL (ref 4.35–5.65)
SODIUM SERPL-SCNC: 146 MMOL/L (ref 136–145)
WBC # BLD AUTO: 7.8 K/UL (ref 4.6–13.2)

## 2025-01-26 PROCEDURE — 80048 BASIC METABOLIC PNL TOTAL CA: CPT

## 2025-01-26 PROCEDURE — 6370000000 HC RX 637 (ALT 250 FOR IP): Performed by: STUDENT IN AN ORGANIZED HEALTH CARE EDUCATION/TRAINING PROGRAM

## 2025-01-26 PROCEDURE — 2500000003 HC RX 250 WO HCPCS: Performed by: STUDENT IN AN ORGANIZED HEALTH CARE EDUCATION/TRAINING PROGRAM

## 2025-01-26 PROCEDURE — 6360000002 HC RX W HCPCS: Performed by: STUDENT IN AN ORGANIZED HEALTH CARE EDUCATION/TRAINING PROGRAM

## 2025-01-26 PROCEDURE — 2580000003 HC RX 258: Performed by: STUDENT IN AN ORGANIZED HEALTH CARE EDUCATION/TRAINING PROGRAM

## 2025-01-26 PROCEDURE — 85025 COMPLETE CBC W/AUTO DIFF WBC: CPT

## 2025-01-26 PROCEDURE — 1100000000 HC RM PRIVATE

## 2025-01-26 PROCEDURE — 2580000003 HC RX 258: Performed by: INTERNAL MEDICINE

## 2025-01-26 PROCEDURE — 36415 COLL VENOUS BLD VENIPUNCTURE: CPT

## 2025-01-26 PROCEDURE — 6360000002 HC RX W HCPCS: Performed by: INTERNAL MEDICINE

## 2025-01-26 PROCEDURE — 99024 POSTOP FOLLOW-UP VISIT: CPT | Performed by: ORTHOPAEDIC SURGERY

## 2025-01-26 PROCEDURE — 94761 N-INVAS EAR/PLS OXIMETRY MLT: CPT

## 2025-01-26 PROCEDURE — 99232 SBSQ HOSP IP/OBS MODERATE 35: CPT | Performed by: INTERNAL MEDICINE

## 2025-01-26 RX ORDER — CALCIUM GLUCONATE 20 MG/ML
1000 INJECTION, SOLUTION INTRAVENOUS
Status: COMPLETED | OUTPATIENT
Start: 2025-01-26 | End: 2025-01-26

## 2025-01-26 RX ORDER — DEXTROSE MONOHYDRATE 50 MG/ML
INJECTION, SOLUTION INTRAVENOUS CONTINUOUS
Status: DISCONTINUED | OUTPATIENT
Start: 2025-01-26 | End: 2025-01-28

## 2025-01-26 RX ORDER — HEPARIN SODIUM 5000 [USP'U]/ML
5000 INJECTION, SOLUTION INTRAVENOUS; SUBCUTANEOUS 2 TIMES DAILY
Status: DISCONTINUED | OUTPATIENT
Start: 2025-01-26 | End: 2025-01-29 | Stop reason: HOSPADM

## 2025-01-26 RX ORDER — TRAZODONE HYDROCHLORIDE 50 MG/1
50 TABLET, FILM COATED ORAL NIGHTLY PRN
Status: DISCONTINUED | OUTPATIENT
Start: 2025-01-26 | End: 2025-01-29 | Stop reason: HOSPADM

## 2025-01-26 RX ADMIN — OXYCODONE HYDROCHLORIDE 5 MG: 5 TABLET ORAL at 08:54

## 2025-01-26 RX ADMIN — PIPERACILLIN AND TAZOBACTAM 3375 MG: 3; .375 INJECTION, POWDER, LYOPHILIZED, FOR SOLUTION INTRAVENOUS at 12:34

## 2025-01-26 RX ADMIN — SODIUM CHLORIDE, PRESERVATIVE FREE 10 ML: 5 INJECTION INTRAVENOUS at 08:49

## 2025-01-26 RX ADMIN — ROSUVASTATIN CALCIUM 40 MG: 20 TABLET, FILM COATED ORAL at 18:35

## 2025-01-26 RX ADMIN — OXYCODONE HYDROCHLORIDE 5 MG: 5 TABLET ORAL at 04:18

## 2025-01-26 RX ADMIN — THERA TABS 1 TABLET: TAB at 08:45

## 2025-01-26 RX ADMIN — PIPERACILLIN AND TAZOBACTAM 3375 MG: 3; .375 INJECTION, POWDER, LYOPHILIZED, FOR SOLUTION INTRAVENOUS at 04:16

## 2025-01-26 RX ADMIN — HEPARIN SODIUM 5000 UNITS: 5000 INJECTION INTRAVENOUS; SUBCUTANEOUS at 22:34

## 2025-01-26 RX ADMIN — CALCIUM GLUCONATE 1000 MG: 20 INJECTION, SOLUTION INTRAVENOUS at 12:52

## 2025-01-26 RX ADMIN — PIPERACILLIN AND TAZOBACTAM 3375 MG: 3; .375 INJECTION, POWDER, LYOPHILIZED, FOR SOLUTION INTRAVENOUS at 20:12

## 2025-01-26 RX ADMIN — ACETAMINOPHEN 650 MG: 325 TABLET ORAL at 12:46

## 2025-01-26 RX ADMIN — DEXTROSE MONOHYDRATE: 50 INJECTION, SOLUTION INTRAVENOUS at 10:32

## 2025-01-26 RX ADMIN — CALCIUM GLUCONATE 1000 MG: 20 INJECTION, SOLUTION INTRAVENOUS at 14:15

## 2025-01-26 RX ADMIN — THIAMINE HCL TAB 100 MG 100 MG: 100 TAB at 08:45

## 2025-01-26 RX ADMIN — DEXTROSE MONOHYDRATE: 50 INJECTION, SOLUTION INTRAVENOUS at 12:18

## 2025-01-26 RX ADMIN — SODIUM CHLORIDE, PRESERVATIVE FREE 10 ML: 5 INJECTION INTRAVENOUS at 20:01

## 2025-01-26 RX ADMIN — DEXTROSE MONOHYDRATE: 50 INJECTION, SOLUTION INTRAVENOUS at 14:32

## 2025-01-26 RX ADMIN — SERTRALINE HYDROCHLORIDE 50 MG: 50 TABLET ORAL at 08:46

## 2025-01-26 RX ADMIN — LEVOTHYROXINE SODIUM 50 MCG: 0.05 TABLET ORAL at 04:10

## 2025-01-26 ASSESSMENT — PAIN - FUNCTIONAL ASSESSMENT
PAIN_FUNCTIONAL_ASSESSMENT: ACTIVITIES ARE NOT PREVENTED
PAIN_FUNCTIONAL_ASSESSMENT: PREVENTS OR INTERFERES SOME ACTIVE ACTIVITIES AND ADLS
PAIN_FUNCTIONAL_ASSESSMENT: PREVENTS OR INTERFERES SOME ACTIVE ACTIVITIES AND ADLS
PAIN_FUNCTIONAL_ASSESSMENT: ACTIVITIES ARE NOT PREVENTED

## 2025-01-26 ASSESSMENT — PAIN DESCRIPTION - ORIENTATION
ORIENTATION: LEFT

## 2025-01-26 ASSESSMENT — PAIN DESCRIPTION - LOCATION
LOCATION: LEG

## 2025-01-26 ASSESSMENT — PAIN DESCRIPTION - FREQUENCY
FREQUENCY: INTERMITTENT
FREQUENCY: INTERMITTENT

## 2025-01-26 ASSESSMENT — PAIN DESCRIPTION - DESCRIPTORS
DESCRIPTORS: ACHING
DESCRIPTORS: ACHING
DESCRIPTORS: THROBBING
DESCRIPTORS: SHARP

## 2025-01-26 ASSESSMENT — PAIN SCALES - GENERAL
PAINLEVEL_OUTOF10: 0
PAINLEVEL_OUTOF10: 0
PAINLEVEL_OUTOF10: 7
PAINLEVEL_OUTOF10: 10
PAINLEVEL_OUTOF10: 0
PAINLEVEL_OUTOF10: 0
PAINLEVEL_OUTOF10: 5

## 2025-01-26 ASSESSMENT — PAIN DESCRIPTION - PAIN TYPE
TYPE: SURGICAL PAIN

## 2025-01-26 ASSESSMENT — PAIN DESCRIPTION - ONSET: ONSET: GRADUAL

## 2025-01-26 ASSESSMENT — PAIN DESCRIPTION - DIRECTION: RADIATING_TOWARDS: NO

## 2025-01-26 NOTE — OP NOTE
Operative Note      Patient: Stefano Begum Jr.  YOB: 1940  MRN: [id number]    Date of Procedure: 1/25/2025    Pre-Op Diagnosis Codes:      * PAD (peripheral artery disease) ([facility]) [I73.9]    Post-Op Diagnosis: Same       Procedure(s):    LEFT LEG ABOVE KNEE AMPUTATION    Surgeon(s):  Larry Miller MD    Assistant:   Surgical Assistant: Ruby Jurado    Anesthesia: General    Estimated Blood Loss (mL): 150mL    Complications: None    Specimens:   ID Type Source Tests Collected by Time Destination   A : left leg aka Tissue Leg SURGICAL PATHOLOGY Ronald Villarreal MD 1/25/2025 1125        Implants:  * No implants in log *      Drains:   Urinary Catheter 01/25/25 2 Way;Collins (Active)   $ Urethral catheter insertion Inserted for procedure 01/25/25 2038   Catheter Indications Perioperative use for selected surgical procedures;Urinary retention (acute or chronic), continuous bladder irrigation or bladder outlet obstruction 01/25/25 2038   Site Assessment No urethral drainage 01/25/25 1312   Urine Color Yellow 01/25/25 1312   Urine Appearance Clear 01/25/25 1312   Urine Odor Malodorous 01/25/25 1312   Collection Container Standard 01/25/25 1312   Securement Method Securing device (Describe) 01/25/25 1312   Status Draining 01/25/25 1312   Output (mL) 100 mL 01/26/25 0054       [REMOVED] External Urinary Catheter (Removed)   Site Assessment Clean,dry & intact 01/08/25 0748   Placement Replaced 01/08/25 0748   Securement Method Tape 01/07/25 1701   Catheter Care Catheter/Wick replaced 01/08/25 0748   Perineal Care Yes 01/08/25 0748   Suction 40 mmgHg continuous 01/08/25 0748   Urine Color Yellow 01/08/25 0748   Urine Appearance Clear 01/08/25 0748   Urine Odor Other (Comment) 01/07/25 1701   Output (mL) 400 mL 01/08/25 0748       Findings:  Infection Present At Time Of Surgery (PATOS) (choose all levels that have infection present):  No infection present      Detailed Description of Procedure:   Indications -

## 2025-01-27 LAB
ALBUMIN SERPL-MCNC: 2.2 G/DL (ref 3.4–5)
ALBUMIN/GLOB SERPL: 0.8 (ref 0.8–1.7)
ALP SERPL-CCNC: 57 U/L (ref 45–117)
ALT SERPL-CCNC: 15 U/L (ref 16–61)
ANION GAP SERPL CALC-SCNC: 2 MMOL/L (ref 3–18)
AST SERPL-CCNC: 13 U/L (ref 10–38)
BASOPHILS # BLD: 0.03 K/UL (ref 0–0.1)
BASOPHILS NFR BLD: 0.4 % (ref 0–2)
BILIRUB SERPL-MCNC: 0.6 MG/DL (ref 0.2–1)
BUN SERPL-MCNC: 16 MG/DL (ref 7–18)
BUN/CREAT SERPL: 19 (ref 12–20)
CALCIUM SERPL-MCNC: 8.3 MG/DL (ref 8.5–10.1)
CHLORIDE SERPL-SCNC: 110 MMOL/L (ref 100–111)
CO2 SERPL-SCNC: 29 MMOL/L (ref 21–32)
CREAT SERPL-MCNC: 0.83 MG/DL (ref 0.6–1.3)
DIFFERENTIAL METHOD BLD: ABNORMAL
EOSINOPHIL # BLD: 0.01 K/UL (ref 0–0.4)
EOSINOPHIL NFR BLD: 0.1 % (ref 0–5)
ERYTHROCYTE [DISTWIDTH] IN BLOOD BY AUTOMATED COUNT: 18.2 % (ref 11.6–14.5)
GLOBULIN SER CALC-MCNC: 2.7 G/DL (ref 2–4)
GLUCOSE SERPL-MCNC: 102 MG/DL (ref 74–99)
HCT VFR BLD AUTO: 29.5 % (ref 36–48)
HGB BLD-MCNC: 8.7 G/DL (ref 13–16)
IMM GRANULOCYTES # BLD AUTO: 0.05 K/UL (ref 0–0.04)
IMM GRANULOCYTES NFR BLD AUTO: 0.6 % (ref 0–0.5)
IRON SATN MFR SERPL: 8 % (ref 20–50)
IRON SERPL-MCNC: 14 UG/DL (ref 50–175)
LYMPHOCYTES # BLD: 1.16 K/UL (ref 0.9–3.6)
LYMPHOCYTES NFR BLD: 14.2 % (ref 21–52)
MCH RBC QN AUTO: 27.1 PG (ref 24–34)
MCHC RBC AUTO-ENTMCNC: 29.5 G/DL (ref 31–37)
MCV RBC AUTO: 91.9 FL (ref 78–100)
MONOCYTES # BLD: 0.84 K/UL (ref 0.05–1.2)
MONOCYTES NFR BLD: 10.3 % (ref 3–10)
NEUTS SEG # BLD: 6.09 K/UL (ref 1.8–8)
NEUTS SEG NFR BLD: 74.4 % (ref 40–73)
NRBC # BLD: 0 K/UL (ref 0–0.01)
NRBC BLD-RTO: 0 PER 100 WBC
PLATELET # BLD AUTO: 82 K/UL (ref 135–420)
PMV BLD AUTO: 11.6 FL (ref 9.2–11.8)
POTASSIUM SERPL-SCNC: 4.5 MMOL/L (ref 3.5–5.5)
PROT SERPL-MCNC: 4.9 G/DL (ref 6.4–8.2)
RBC # BLD AUTO: 3.21 M/UL (ref 4.35–5.65)
SODIUM SERPL-SCNC: 141 MMOL/L (ref 136–145)
TIBC SERPL-MCNC: 181 UG/DL (ref 250–450)
WBC # BLD AUTO: 8.2 K/UL (ref 4.6–13.2)

## 2025-01-27 PROCEDURE — 6370000000 HC RX 637 (ALT 250 FOR IP): Performed by: STUDENT IN AN ORGANIZED HEALTH CARE EDUCATION/TRAINING PROGRAM

## 2025-01-27 PROCEDURE — 97165 OT EVAL LOW COMPLEX 30 MIN: CPT

## 2025-01-27 PROCEDURE — 6360000002 HC RX W HCPCS: Performed by: INTERNAL MEDICINE

## 2025-01-27 PROCEDURE — 80053 COMPREHEN METABOLIC PANEL: CPT

## 2025-01-27 PROCEDURE — 36415 COLL VENOUS BLD VENIPUNCTURE: CPT

## 2025-01-27 PROCEDURE — 2580000003 HC RX 258: Performed by: INTERNAL MEDICINE

## 2025-01-27 PROCEDURE — 85025 COMPLETE CBC W/AUTO DIFF WBC: CPT

## 2025-01-27 PROCEDURE — 83550 IRON BINDING TEST: CPT

## 2025-01-27 PROCEDURE — 1100000000 HC RM PRIVATE

## 2025-01-27 PROCEDURE — 2500000003 HC RX 250 WO HCPCS: Performed by: STUDENT IN AN ORGANIZED HEALTH CARE EDUCATION/TRAINING PROGRAM

## 2025-01-27 PROCEDURE — 97162 PT EVAL MOD COMPLEX 30 MIN: CPT

## 2025-01-27 PROCEDURE — 2580000003 HC RX 258: Performed by: STUDENT IN AN ORGANIZED HEALTH CARE EDUCATION/TRAINING PROGRAM

## 2025-01-27 PROCEDURE — 83540 ASSAY OF IRON: CPT

## 2025-01-27 PROCEDURE — 6360000002 HC RX W HCPCS: Performed by: STUDENT IN AN ORGANIZED HEALTH CARE EDUCATION/TRAINING PROGRAM

## 2025-01-27 RX ADMIN — SODIUM CHLORIDE, PRESERVATIVE FREE 10 ML: 5 INJECTION INTRAVENOUS at 22:05

## 2025-01-27 RX ADMIN — HEPARIN SODIUM 5000 UNITS: 5000 INJECTION INTRAVENOUS; SUBCUTANEOUS at 11:07

## 2025-01-27 RX ADMIN — DEXTROSE MONOHYDRATE: 50 INJECTION, SOLUTION INTRAVENOUS at 22:11

## 2025-01-27 RX ADMIN — TRAZODONE HYDROCHLORIDE 50 MG: 50 TABLET ORAL at 22:14

## 2025-01-27 RX ADMIN — THERA TABS 1 TABLET: TAB at 11:06

## 2025-01-27 RX ADMIN — THIAMINE HCL TAB 100 MG 100 MG: 100 TAB at 11:07

## 2025-01-27 RX ADMIN — PIPERACILLIN AND TAZOBACTAM 3375 MG: 3; .375 INJECTION, POWDER, LYOPHILIZED, FOR SOLUTION INTRAVENOUS at 05:09

## 2025-01-27 RX ADMIN — OXYCODONE HYDROCHLORIDE 5 MG: 5 TABLET ORAL at 14:15

## 2025-01-27 RX ADMIN — LEVOTHYROXINE SODIUM 50 MCG: 0.05 TABLET ORAL at 05:12

## 2025-01-27 RX ADMIN — OXYCODONE HYDROCHLORIDE 5 MG: 5 TABLET ORAL at 22:14

## 2025-01-27 RX ADMIN — SODIUM CHLORIDE, PRESERVATIVE FREE 10 ML: 5 INJECTION INTRAVENOUS at 11:08

## 2025-01-27 RX ADMIN — ROSUVASTATIN CALCIUM 40 MG: 20 TABLET, FILM COATED ORAL at 18:03

## 2025-01-27 RX ADMIN — ACETAMINOPHEN 650 MG: 325 TABLET ORAL at 02:27

## 2025-01-27 ASSESSMENT — PAIN DESCRIPTION - ORIENTATION
ORIENTATION: LEFT
ORIENTATION: RIGHT
ORIENTATION: RIGHT
ORIENTATION: LEFT
ORIENTATION: RIGHT

## 2025-01-27 ASSESSMENT — PAIN DESCRIPTION - LOCATION
LOCATION: LEG
LOCATION: LEG
LOCATION: HIP
LOCATION: HIP
LOCATION: LEG

## 2025-01-27 ASSESSMENT — PAIN SCALES - GENERAL
PAINLEVEL_OUTOF10: 0
PAINLEVEL_OUTOF10: 3
PAINLEVEL_OUTOF10: 0
PAINLEVEL_OUTOF10: 4
PAINLEVEL_OUTOF10: 0
PAINLEVEL_OUTOF10: 7
PAINLEVEL_OUTOF10: 7

## 2025-01-27 ASSESSMENT — PAIN DESCRIPTION - FREQUENCY
FREQUENCY: INTERMITTENT

## 2025-01-27 ASSESSMENT — PAIN DESCRIPTION - ONSET
ONSET: GRADUAL

## 2025-01-27 ASSESSMENT — PAIN DESCRIPTION - DIRECTION
RADIATING_TOWARDS: NO
RADIATING_TOWARDS: NO

## 2025-01-27 ASSESSMENT — PAIN - FUNCTIONAL ASSESSMENT
PAIN_FUNCTIONAL_ASSESSMENT: ACTIVITIES ARE NOT PREVENTED

## 2025-01-27 ASSESSMENT — PAIN DESCRIPTION - PAIN TYPE
TYPE: SURGICAL PAIN

## 2025-01-27 ASSESSMENT — PAIN SCALES - WONG BAKER
WONGBAKER_NUMERICALRESPONSE: HURTS LITTLE MORE
WONGBAKER_NUMERICALRESPONSE: NO HURT

## 2025-01-27 ASSESSMENT — PAIN DESCRIPTION - DESCRIPTORS
DESCRIPTORS: ACHING;DISCOMFORT
DESCRIPTORS: ACHING
DESCRIPTORS: ACHING;DISCOMFORT
DESCRIPTORS: ACHING
DESCRIPTORS: ACHING;DISCOMFORT

## 2025-01-27 NOTE — CARE COORDINATION
OT note is not yet in patient's chart for CM to be able to start auth for patient for SNF [facility].             Shawnee Mancera RN  Case Management [phone]

## 2025-01-28 LAB
ABO + RH BLD: NORMAL
ALBUMIN SERPL-MCNC: 2.1 G/DL (ref 3.4–5)
ALBUMIN/GLOB SERPL: 0.7 (ref 0.8–1.7)
ALP SERPL-CCNC: 56 U/L (ref 45–117)
ALT SERPL-CCNC: 13 U/L (ref 16–61)
ANION GAP SERPL CALC-SCNC: 5 MMOL/L (ref 3–18)
AST SERPL-CCNC: 15 U/L (ref 10–38)
BASOPHILS # BLD: 0.02 K/UL (ref 0–0.1)
BASOPHILS NFR BLD: 0.3 % (ref 0–2)
BILIRUB SERPL-MCNC: 1.1 MG/DL (ref 0.2–1)
BLD PROD TYP BPU: NORMAL
BLD PROD TYP BPU: NORMAL
BLOOD BANK DISPENSE STATUS: NORMAL
BLOOD BANK DISPENSE STATUS: NORMAL
BLOOD GROUP ANTIBODIES SERPL: NORMAL
BPU ID: NORMAL
BPU ID: NORMAL
BUN SERPL-MCNC: 14 MG/DL (ref 7–18)
BUN/CREAT SERPL: 22 (ref 12–20)
CALCIUM SERPL-MCNC: 8 MG/DL (ref 8.5–10.1)
CHLORIDE SERPL-SCNC: 102 MMOL/L (ref 100–111)
CO2 SERPL-SCNC: 30 MMOL/L (ref 21–32)
CREAT SERPL-MCNC: 0.65 MG/DL (ref 0.6–1.3)
CROSSMATCH RESULT: NORMAL
CROSSMATCH RESULT: NORMAL
DIFFERENTIAL METHOD BLD: ABNORMAL
EOSINOPHIL # BLD: 0.02 K/UL (ref 0–0.4)
EOSINOPHIL NFR BLD: 0.3 % (ref 0–5)
ERYTHROCYTE [DISTWIDTH] IN BLOOD BY AUTOMATED COUNT: 18 % (ref 11.6–14.5)
FIBRINOGEN PPP-MCNC: 116 MG/DL (ref 210–451)
FOLATE SERPL-MCNC: 18.7 NG/ML (ref 3.1–17.5)
GLOBULIN SER CALC-MCNC: 2.9 G/DL (ref 2–4)
GLUCOSE SERPL-MCNC: 96 MG/DL (ref 74–99)
HCT VFR BLD AUTO: 29.8 % (ref 36–48)
HGB BLD-MCNC: 9.1 G/DL (ref 13–16)
IMM GRANULOCYTES # BLD AUTO: 0.04 K/UL (ref 0–0.04)
IMM GRANULOCYTES NFR BLD AUTO: 0.6 % (ref 0–0.5)
LDH SERPL L TO P-CCNC: 153 U/L (ref 81–234)
LYMPHOCYTES # BLD: 0.9 K/UL (ref 0.9–3.6)
LYMPHOCYTES NFR BLD: 13.2 % (ref 21–52)
MCH RBC QN AUTO: 27.4 PG (ref 24–34)
MCHC RBC AUTO-ENTMCNC: 30.5 G/DL (ref 31–37)
MCV RBC AUTO: 89.8 FL (ref 78–100)
MONOCYTES # BLD: 0.67 K/UL (ref 0.05–1.2)
MONOCYTES NFR BLD: 9.9 % (ref 3–10)
NEUTS SEG # BLD: 5.15 K/UL (ref 1.8–8)
NEUTS SEG NFR BLD: 75.7 % (ref 40–73)
NRBC # BLD: 0 K/UL (ref 0–0.01)
NRBC BLD-RTO: 0 PER 100 WBC
PLATELET # BLD AUTO: 76 K/UL (ref 135–420)
PMV BLD AUTO: 12 FL (ref 9.2–11.8)
POTASSIUM SERPL-SCNC: 3.7 MMOL/L (ref 3.5–5.5)
PROT SERPL-MCNC: 5 G/DL (ref 6.4–8.2)
RBC # BLD AUTO: 3.32 M/UL (ref 4.35–5.65)
RETICS/RBC NFR AUTO: 0.5 % (ref 0.5–2.5)
SODIUM SERPL-SCNC: 137 MMOL/L (ref 136–145)
SPECIMEN EXP DATE BLD: NORMAL
UNIT DIVISION: 0
UNIT DIVISION: 0
VIT B12 SERPL-MCNC: 420 PG/ML (ref 211–911)
WBC # BLD AUTO: 6.8 K/UL (ref 4.6–13.2)

## 2025-01-28 PROCEDURE — 1100000000 HC RM PRIVATE

## 2025-01-28 PROCEDURE — 97530 THERAPEUTIC ACTIVITIES: CPT

## 2025-01-28 PROCEDURE — 2500000003 HC RX 250 WO HCPCS: Performed by: STUDENT IN AN ORGANIZED HEALTH CARE EDUCATION/TRAINING PROGRAM

## 2025-01-28 PROCEDURE — 80053 COMPREHEN METABOLIC PANEL: CPT

## 2025-01-28 PROCEDURE — 99024 POSTOP FOLLOW-UP VISIT: CPT | Performed by: ORTHOPAEDIC SURGERY

## 2025-01-28 PROCEDURE — 82746 ASSAY OF FOLIC ACID SERUM: CPT

## 2025-01-28 PROCEDURE — 83615 LACTATE (LD) (LDH) ENZYME: CPT

## 2025-01-28 PROCEDURE — 6370000000 HC RX 637 (ALT 250 FOR IP): Performed by: STUDENT IN AN ORGANIZED HEALTH CARE EDUCATION/TRAINING PROGRAM

## 2025-01-28 PROCEDURE — 85384 FIBRINOGEN ACTIVITY: CPT

## 2025-01-28 PROCEDURE — 36415 COLL VENOUS BLD VENIPUNCTURE: CPT

## 2025-01-28 PROCEDURE — 97535 SELF CARE MNGMENT TRAINING: CPT

## 2025-01-28 PROCEDURE — 85025 COMPLETE CBC W/AUTO DIFF WBC: CPT

## 2025-01-28 PROCEDURE — 94761 N-INVAS EAR/PLS OXIMETRY MLT: CPT

## 2025-01-28 PROCEDURE — 82607 VITAMIN B-12: CPT

## 2025-01-28 PROCEDURE — 85045 AUTOMATED RETICULOCYTE COUNT: CPT

## 2025-01-28 RX ADMIN — SODIUM CHLORIDE, PRESERVATIVE FREE 10 ML: 5 INJECTION INTRAVENOUS at 20:35

## 2025-01-28 RX ADMIN — THIAMINE HCL TAB 100 MG 100 MG: 100 TAB at 07:55

## 2025-01-28 RX ADMIN — SERTRALINE HYDROCHLORIDE 50 MG: 50 TABLET ORAL at 07:55

## 2025-01-28 RX ADMIN — OXYCODONE HYDROCHLORIDE 5 MG: 5 TABLET ORAL at 12:17

## 2025-01-28 RX ADMIN — ROSUVASTATIN CALCIUM 40 MG: 20 TABLET, FILM COATED ORAL at 17:26

## 2025-01-28 RX ADMIN — THERA TABS 1 TABLET: TAB at 07:55

## 2025-01-28 RX ADMIN — ACETAMINOPHEN 650 MG: 325 TABLET ORAL at 07:55

## 2025-01-28 RX ADMIN — SODIUM CHLORIDE, PRESERVATIVE FREE 10 ML: 5 INJECTION INTRAVENOUS at 07:58

## 2025-01-28 RX ADMIN — LEVOTHYROXINE SODIUM 50 MCG: 0.05 TABLET ORAL at 06:33

## 2025-01-28 ASSESSMENT — PAIN DESCRIPTION - DESCRIPTORS
DESCRIPTORS: ACHING;DISCOMFORT
DESCRIPTORS: ACHING
DESCRIPTORS: ACHING

## 2025-01-28 ASSESSMENT — PAIN SCALES - WONG BAKER
WONGBAKER_NUMERICALRESPONSE: NO HURT
WONGBAKER_NUMERICALRESPONSE: HURTS LITTLE MORE
WONGBAKER_NUMERICALRESPONSE: HURTS LITTLE MORE
WONGBAKER_NUMERICALRESPONSE: NO HURT

## 2025-01-28 ASSESSMENT — PAIN - FUNCTIONAL ASSESSMENT
PAIN_FUNCTIONAL_ASSESSMENT: ACTIVITIES ARE NOT PREVENTED

## 2025-01-28 ASSESSMENT — PAIN SCALES - GENERAL
PAINLEVEL_OUTOF10: 0
PAINLEVEL_OUTOF10: 0
PAINLEVEL_OUTOF10: 7
PAINLEVEL_OUTOF10: 0
PAINLEVEL_OUTOF10: 4
PAINLEVEL_OUTOF10: 0
PAINLEVEL_OUTOF10: 0
PAINLEVEL_OUTOF10: 4
PAINLEVEL_OUTOF10: 0

## 2025-01-28 ASSESSMENT — PAIN DESCRIPTION - ONSET
ONSET: GRADUAL
ONSET: GRADUAL
ONSET: ON-GOING

## 2025-01-28 ASSESSMENT — PAIN DESCRIPTION - DIRECTION
RADIATING_TOWARDS: NO
RADIATING_TOWARDS: NO

## 2025-01-28 ASSESSMENT — PAIN DESCRIPTION - FREQUENCY
FREQUENCY: INTERMITTENT

## 2025-01-28 ASSESSMENT — PAIN DESCRIPTION - ORIENTATION
ORIENTATION: LEFT
ORIENTATION: LEFT
ORIENTATION: RIGHT

## 2025-01-28 ASSESSMENT — PAIN DESCRIPTION - PAIN TYPE
TYPE: SURGICAL PAIN

## 2025-01-28 ASSESSMENT — PAIN DESCRIPTION - LOCATION
LOCATION: LEG;INCISION
LOCATION: KNEE
LOCATION: LEG

## 2025-01-28 NOTE — CARE COORDINATION
CM uploaded clinicals to [facility] for SNF to [facility].             Shawnee Mancera RN  Case Management [phone]

## 2025-01-28 NOTE — CARE COORDINATION
NEHAL called and spoke with Nisreen with SNF to LTC [facility], updated that therapy notes are in the chart to start auth for SNF today.   Nisreen said they can take patient and have a bed for patient.   Nisreen said if for any reason auth for SNF is not approved, they will still be able to take patient for LTC, and that Medicaid is getting worked on for patient at SNF.           Shawnee Mancera, RN  Case Management [phone]

## 2025-01-28 NOTE — CARE COORDINATION
Pre-cert Request   1/28/2025, 12:35 PM    Patient Name: Stefano Begum Jr.                   YOB: 1940      *ALERT - Authorization is pending review by the insurance company.  This is not an authorization.*    Submitted via Jose.  Requested Facility:  [facility]  Anticipated Admit Date to [facility]:  01/28/2025  Pending Auth #:  [id number]   Pending Plan Auth ID:     Livier Lobo  Case Management Department  Ph: [phone] Fax: [phone]

## 2025-01-28 NOTE — CARE COORDINATION
SNF Authorization Request  1/28/2025, 8:25 AM    Patient Name: Stefano Begum Jr.                   YOB: 1940    Patient has been provided with freedom of choice and has chosen to go to SNF [facility].     SNF has confirmed that they can accept the patient and they have a bed Yes  SNF has confirmed that they are in network with the patient's insurance Yes    Please request authorization.    Estimated date of discharge is 01/28/2025.     Skilled Need    PT Yes   OT Yes   Speech therapy No   Wound Care No  IV MedicationsNo  Trach No   Peg No     If PT/ OT/ Speech is required, evaluations/ notes have been updated within the last 48 hours Yes       Additional information NA    Payor: Payor: [employer] MEDICARE / Plan: [employer] GOLD PLUS HMO / Product Type: *No Product type* /   Attending physician: Enrique Chen DO Susan Flores  Case Management Department  Ph:  [phone]

## 2025-01-28 NOTE — CARE COORDINATION
CM Perfect Served CM Specialist Asif to request assistance starting auth for SNF [facility], and that auth for SNF note is in the chart.             Shawnee Mancera RN  Case Management [phone]

## 2025-01-28 NOTE — CARE COORDINATION
Livier CALVERT Specialist Perfect Served CM to let CM know that auth for SNF [facility] has been started today, and is pending at this time.             Shawnee Mancera, RN  Case Management [phone]

## 2025-01-29 VITALS
HEIGHT: 70 IN | RESPIRATION RATE: 18 BRPM | TEMPERATURE: 98.6 F | WEIGHT: 102.73 LBS | BODY MASS INDEX: 14.71 KG/M2 | OXYGEN SATURATION: 95 % | SYSTOLIC BLOOD PRESSURE: 144 MMHG | HEART RATE: 100 BPM | DIASTOLIC BLOOD PRESSURE: 92 MMHG

## 2025-01-29 LAB
ALBUMIN SERPL-MCNC: 2 G/DL (ref 3.4–5)
ALBUMIN/GLOB SERPL: 0.7 (ref 0.8–1.7)
ALP SERPL-CCNC: 65 U/L (ref 45–117)
ALT SERPL-CCNC: 60 U/L (ref 16–61)
ANION GAP SERPL CALC-SCNC: 4 MMOL/L (ref 3–18)
APTT PPP: 38.1 SEC (ref 23–36.4)
AST SERPL-CCNC: 76 U/L (ref 10–38)
BACTERIA SPEC CULT: NORMAL
BACTERIA SPEC CULT: NORMAL
BASOPHILS # BLD: 0.01 K/UL (ref 0–0.1)
BASOPHILS NFR BLD: 0.2 % (ref 0–2)
BILIRUB SERPL-MCNC: 0.6 MG/DL (ref 0.2–1)
BUN SERPL-MCNC: 21 MG/DL (ref 7–18)
BUN/CREAT SERPL: 36 (ref 12–20)
CALCIUM SERPL-MCNC: 7.9 MG/DL (ref 8.5–10.1)
CHLORIDE SERPL-SCNC: 102 MMOL/L (ref 100–111)
CO2 SERPL-SCNC: 29 MMOL/L (ref 21–32)
CREAT SERPL-MCNC: 0.58 MG/DL (ref 0.6–1.3)
DIFFERENTIAL METHOD BLD: ABNORMAL
EOSINOPHIL # BLD: 0.02 K/UL (ref 0–0.4)
EOSINOPHIL NFR BLD: 0.4 % (ref 0–5)
ERYTHROCYTE [DISTWIDTH] IN BLOOD BY AUTOMATED COUNT: 17.7 % (ref 11.6–14.5)
GLOBULIN SER CALC-MCNC: 3 G/DL (ref 2–4)
GLUCOSE SERPL-MCNC: 88 MG/DL (ref 74–99)
HCT VFR BLD AUTO: 28.5 % (ref 36–48)
HGB BLD-MCNC: 8.9 G/DL (ref 13–16)
IMM GRANULOCYTES # BLD AUTO: 0.02 K/UL (ref 0–0.04)
IMM GRANULOCYTES NFR BLD AUTO: 0.4 % (ref 0–0.5)
INR PPP: 1.4 (ref 0.9–1.1)
LYMPHOCYTES # BLD: 0.75 K/UL (ref 0.9–3.6)
LYMPHOCYTES NFR BLD: 13.3 % (ref 21–52)
MCH RBC QN AUTO: 27.1 PG (ref 24–34)
MCHC RBC AUTO-ENTMCNC: 31.2 G/DL (ref 31–37)
MCV RBC AUTO: 86.9 FL (ref 78–100)
MONOCYTES # BLD: 0.53 K/UL (ref 0.05–1.2)
MONOCYTES NFR BLD: 9.4 % (ref 3–10)
NEUTS SEG # BLD: 4.33 K/UL (ref 1.8–8)
NEUTS SEG NFR BLD: 76.3 % (ref 40–73)
NRBC # BLD: 0 K/UL (ref 0–0.01)
NRBC BLD-RTO: 0 PER 100 WBC
PERIPHERAL SMEAR, MD REVIEW: NORMAL
PLATELET # BLD AUTO: 70 K/UL (ref 135–420)
PMV BLD AUTO: 11.9 FL (ref 9.2–11.8)
POTASSIUM SERPL-SCNC: 3.4 MMOL/L (ref 3.5–5.5)
PROT SERPL-MCNC: 5 G/DL (ref 6.4–8.2)
PROTHROMBIN TIME: 17.2 SEC (ref 11.9–14.9)
RBC # BLD AUTO: 3.28 M/UL (ref 4.35–5.65)
SERVICE CMNT-IMP: NORMAL
SERVICE CMNT-IMP: NORMAL
SODIUM SERPL-SCNC: 135 MMOL/L (ref 136–145)
WBC # BLD AUTO: 5.7 K/UL (ref 4.6–13.2)

## 2025-01-29 PROCEDURE — 6370000000 HC RX 637 (ALT 250 FOR IP): Performed by: STUDENT IN AN ORGANIZED HEALTH CARE EDUCATION/TRAINING PROGRAM

## 2025-01-29 PROCEDURE — 36415 COLL VENOUS BLD VENIPUNCTURE: CPT

## 2025-01-29 PROCEDURE — 85610 PROTHROMBIN TIME: CPT

## 2025-01-29 PROCEDURE — 94761 N-INVAS EAR/PLS OXIMETRY MLT: CPT

## 2025-01-29 PROCEDURE — 97110 THERAPEUTIC EXERCISES: CPT

## 2025-01-29 PROCEDURE — 97530 THERAPEUTIC ACTIVITIES: CPT

## 2025-01-29 PROCEDURE — 80053 COMPREHEN METABOLIC PANEL: CPT

## 2025-01-29 PROCEDURE — 2500000003 HC RX 250 WO HCPCS: Performed by: STUDENT IN AN ORGANIZED HEALTH CARE EDUCATION/TRAINING PROGRAM

## 2025-01-29 PROCEDURE — 85025 COMPLETE CBC W/AUTO DIFF WBC: CPT

## 2025-01-29 PROCEDURE — 85730 THROMBOPLASTIN TIME PARTIAL: CPT

## 2025-01-29 PROCEDURE — 97535 SELF CARE MNGMENT TRAINING: CPT

## 2025-01-29 RX ORDER — OXYCODONE HYDROCHLORIDE 5 MG/1
5 TABLET ORAL EVERY 4 HOURS PRN
Qty: 12 TABLET | Refills: 0 | Status: SHIPPED | OUTPATIENT
Start: 2025-01-29 | End: 2025-02-01

## 2025-01-29 RX ORDER — SENNOSIDES 8.6 MG
1 TABLET ORAL DAILY
Status: SHIPPED | DISCHARGE
Start: 2025-01-29 | End: 2025-02-03

## 2025-01-29 RX ADMIN — LEVOTHYROXINE SODIUM 50 MCG: 0.05 TABLET ORAL at 05:34

## 2025-01-29 RX ADMIN — POTASSIUM CHLORIDE 40 MEQ: 1500 TABLET, EXTENDED RELEASE ORAL at 05:34

## 2025-01-29 RX ADMIN — SODIUM CHLORIDE, PRESERVATIVE FREE 10 ML: 5 INJECTION INTRAVENOUS at 09:33

## 2025-01-29 RX ADMIN — THERA TABS 1 TABLET: TAB at 09:31

## 2025-01-29 RX ADMIN — THIAMINE HCL TAB 100 MG 100 MG: 100 TAB at 09:31

## 2025-01-29 RX ADMIN — SERTRALINE HYDROCHLORIDE 50 MG: 50 TABLET ORAL at 09:31

## 2025-01-29 RX ADMIN — SODIUM CHLORIDE, PRESERVATIVE FREE 10 ML: 5 INJECTION INTRAVENOUS at 20:00

## 2025-01-29 ASSESSMENT — PAIN SCALES - GENERAL
PAINLEVEL_OUTOF10: 0

## 2025-01-29 NOTE — CARE COORDINATION
CM placed Transport Envelope with PCS form, 2 facesheets, and copy of DDNR form on front of patient's chart for Medical Transport to transport patient to [facility] tomorrow, CM requested 2 pm transport, transport is currently be worked on for tomorrow.           Shawnee Mancera, RN  Case Management [phone]

## 2025-01-29 NOTE — PLAN OF CARE
Problem: Chronic Conditions and Co-morbidities  Goal: Patient's chronic conditions and co-morbidity symptoms are monitored and maintained or improved  1/23/2025 0855 by Janis Irene RN  Outcome: Progressing  Flowsheets (Taken 1/22/2025 2232 by Lilo Dash RN)  Care Plan - Patient's Chronic Conditions and Co-Morbidity Symptoms are Monitored and Maintained or Improved:   Monitor and assess patient's chronic conditions and comorbid symptoms for stability, deterioration, or improvement   Collaborate with multidisciplinary team to address chronic and comorbid conditions and prevent exacerbation or deterioration   Update acute care plan with appropriate goals if chronic or comorbid symptoms are exacerbated and prevent overall improvement and discharge  Note: Pt admitted for surgical intervention of L foot , poor historian.  1/22/2025 2232 by Lilo Dash RN  Outcome: Progressing  Flowsheets (Taken 1/22/2025 2232)  Care Plan - Patient's Chronic Conditions and Co-Morbidity Symptoms are Monitored and Maintained or Improved:   Monitor and assess patient's chronic conditions and comorbid symptoms for stability, deterioration, or improvement   Collaborate with multidisciplinary team to address chronic and comorbid conditions and prevent exacerbation or deterioration   Update acute care plan with appropriate goals if chronic or comorbid symptoms are exacerbated and prevent overall improvement and discharge  Note: Assess for deterioration and improvement.     Problem: Discharge Planning  Goal: Discharge to home or other facility with appropriate resources  1/23/2025 0855 by Janis Irene RN  Outcome: Progressing  Flowsheets (Taken 1/22/2025 2232 by Lilo Dash RN)  Discharge to home or other facility with appropriate resources:   Identify barriers to discharge with patient and caregiver   Arrange for needed discharge resources and transportation as appropriate   Identify discharge learning 
  Problem: Chronic Conditions and Co-morbidities  Goal: Patient's chronic conditions and co-morbidity symptoms are monitored and maintained or improved  1/24/2025 2300 by Main Irving GN  Outcome: Progressing  Flowsheets (Taken 1/24/2025 0800 by Meg Irving, RN)  Care Plan - Patient's Chronic Conditions and Co-Morbidity Symptoms are Monitored and Maintained or Improved: Monitor and assess patient's chronic conditions and comorbid symptoms for stability, deterioration, or improvement  Note: Pt left heel wound will be monitored for improvement or deterioration.   1/24/2025 1857 by Meg Irving, RN  Outcome: Progressing  Flowsheets (Taken 1/24/2025 0800)  Care Plan - Patient's Chronic Conditions and Co-Morbidity Symptoms are Monitored and Maintained or Improved: Monitor and assess patient's chronic conditions and comorbid symptoms for stability, deterioration, or improvement     Problem: Discharge Planning  Goal: Discharge to home or other facility with appropriate resources  1/24/2025 2300 by Main Irving GN  Outcome: Progressing  Flowsheets (Taken 1/24/2025 0800 by Meg Irving, RN)  Discharge to home or other facility with appropriate resources: Identify barriers to discharge with patient and caregiver  Note: Case management to assist when needed.   1/24/2025 1857 by Meg Irving RN  Outcome: Progressing  Flowsheets (Taken 1/24/2025 0800)  Discharge to home or other facility with appropriate resources: Identify barriers to discharge with patient and caregiver     Problem: Skin/Tissue Integrity  Goal: Absence of new skin breakdown  Description: 1.  Monitor for areas of redness and/or skin breakdown  2.  Assess vascular access sites hourly  3.  Every 4-6 hours minimum:  Change oxygen saturation probe site  4.  Every 4-6 hours:  If on nasal continuous positive airway pressure, respiratory therapy assess nares and determine need for appliance change or resting period.  1/24/2025 2300 by Aristides 
  Problem: Chronic Conditions and Co-morbidities  Goal: Patient's chronic conditions and co-morbidity symptoms are monitored and maintained or improved  1/25/2025 1731 by Janis Irene RN  Outcome: Progressing  Flowsheets (Taken 1/24/2025 0800 by Meg Irving RN)  Care Plan - Patient's Chronic Conditions and Co-Morbidity Symptoms are Monitored and Maintained or Improved: Monitor and assess patient's chronic conditions and comorbid symptoms for stability, deterioration, or improvement  Note: Patient has a history of PAD, currently admitted for sugircal intervention to [facility]  1/25/2025 1547 by Janis Irene RN  Outcome: Progressing  Flowsheets (Taken 1/24/2025 0800 by Meg Irving RN)  Care Plan - Patient's Chronic Conditions and Co-Morbidity Symptoms are Monitored and Maintained or Improved: Monitor and assess patient's chronic conditions and comorbid symptoms for stability, deterioration, or improvement  Note: Pt has a history of PAD, 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       Problem: Discharge Planning  Goal: Discharge to home or other facility with appropriate resources  1/25/2025 1731 by Janis Irene RN  Outcome: Progressing  Flowsheets (Taken 1/24/2025 0800 by Meg Irving RN)  Discharge to home or other facility with appropriate resources: Identify barriers to discharge with patient and caregiver  Note: Pt is recovering from fresh amputation done today, per case management d/c planning has been initiated   1/25/2025 1547 by Janis Irene RN  Outcome: Progressing  Flowsheets (Taken 1/24/2025 0800 by Meg Irving RN)  Discharge to home or other facility with appropriate resources: Identify barriers to discharge with patient and caregiver  Note: Pt recovering from surgery, d/c planning in progress.. per,     Problem: Skin/Tissue Integrity  Goal: Absence of new skin breakdown  Description: 1.  Monitor for areas of redness and/or skin breakdown  2.  Assess vascular access 
  Problem: Chronic Conditions and Co-morbidities  Goal: Patient's chronic conditions and co-morbidity symptoms are monitored and maintained or improved  1/25/2025 2334 by Main Irving GN  Outcome: Progressing  1/25/2025 2307 by Main Irving GN  Outcome: Progressing  Flowsheets (Taken 1/24/2025 0800 by Meg Irving, RN)  Care Plan - Patient's Chronic Conditions and Co-Morbidity Symptoms are Monitored and Maintained or Improved: Monitor and assess patient's chronic conditions and comorbid symptoms for stability, deterioration, or improvement  Note: Pt had left aka today.  1/25/2025 1731 by Janis Irene RN  Outcome: Progressing  Flowsheets (Taken 1/24/2025 0800 by Meg Irving, RN)  Care Plan - Patient's Chronic Conditions and Co-Morbidity Symptoms are Monitored and Maintained or Improved: Monitor and assess patient's chronic conditions and comorbid symptoms for stability, deterioration, or improvement  Note: Patient has a history of PAD, currently admitted for sugircal intervention to [facility]  1/25/2025 1547 by Janis Irene RN  Outcome: Progressing  Flowsheets (Taken 1/24/2025 0800 by Meg Irving, RN)  Care Plan - Patient's Chronic Conditions and Co-Morbidity Symptoms are Monitored and Maintained or Improved: Monitor and assess patient's chronic conditions and comorbid symptoms for stability, deterioration, or improvement  Note: Pt has a history of PAD, 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   1/25/2025 1731 by Janis Irene, OSVALDO  Outcome: 
  Problem: Chronic Conditions and Co-morbidities  Goal: Patient's chronic conditions and co-morbidity symptoms are monitored and maintained or improved  1/26/2025 1113 by Julien De Guzman GN  Outcome: Progressing  Flowsheets  Taken 1/26/2025 1113  Care Plan - Patient's Chronic Conditions and Co-Morbidity Symptoms are Monitored and Maintained or Improved: Monitor and assess patient's chronic conditions and comorbid symptoms for stability, deterioration, or improvement  Taken 1/26/2025 0705  Care Plan - Patient's Chronic Conditions and Co-Morbidity Symptoms are Monitored and Maintained or Improved:   Monitor and assess patient's chronic conditions and comorbid symptoms for stability, deterioration, or improvement   Collaborate with multidisciplinary team to address chronic and comorbid conditions and prevent exacerbation or deterioration  Note: Nurse will monitor patient's chronic condition and update the HCP     Problem: Discharge Planning  Goal: Discharge to home or other facility with appropriate resources  1/26/2025 1113 by Julien De Guzman GN  Outcome: Progressing  Flowsheets  Taken 1/26/2025 1113  Discharge to home or other facility with appropriate resources: Arrange for needed discharge resources and transportation as appropriate  Taken 1/26/2025 0705  Discharge to home or other facility with appropriate resources:   Identify barriers to discharge with patient and caregiver   Identify discharge learning needs (meds, wound care, etc)   Arrange for needed discharge resources and transportation as appropriate  Note: Nurse will arrange for discharge resources and appropriate transportation     Problem: Skin/Tissue Integrity  Goal: Absence of new skin breakdown  Description: 1.  Monitor for areas of redness and/or skin breakdown  2.  Assess vascular access sites hourly  3.  Every 4-6 hours minimum:  Change oxygen saturation probe site  4.  Every 4-6 hours:  If on nasal continuous positive airway pressure, respiratory 
  Problem: Chronic Conditions and Co-morbidities  Goal: Patient's chronic conditions and co-morbidity symptoms are monitored and maintained or improved  1/28/2025 2139 by Clara Espinal RN  Outcome: Progressing  1/28/2025 0826 by Jessi Durand RN  Outcome: Progressing  Flowsheets (Taken 1/27/2025 1945 by Adeline Desouza RN)  Care Plan - Patient's Chronic Conditions and Co-Morbidity Symptoms are Monitored and Maintained or Improved: Monitor and assess patient's chronic conditions and comorbid symptoms for stability, deterioration, or improvement     Problem: Discharge Planning  Goal: Discharge to home or other facility with appropriate resources  1/28/2025 2139 by Clara Espinal RN  Outcome: Progressing  1/28/2025 0826 by Jessi Durand RN  Outcome: Progressing     Problem: Skin/Tissue Integrity  Goal: Absence of new skin breakdown  Description: 1.  Monitor for areas of redness and/or skin breakdown  2.  Assess vascular access sites hourly  3.  Every 4-6 hours minimum:  Change oxygen saturation probe site  4.  Every 4-6 hours:  If on nasal continuous positive airway pressure, respiratory therapy assess nares and determine need for appliance change or resting period.  1/28/2025 2139 by Clara Espinal RN  Outcome: Progressing  1/28/2025 0826 by Jessi Durand RN  Outcome: Progressing     Problem: Safety - Adult  Goal: Free from fall injury  1/28/2025 2139 by Clara Espinal RN  Outcome: Progressing  1/28/2025 0826 by Jessi Durand RN  Outcome: Progressing     Problem: Pain  Goal: Verbalizes/displays adequate comfort level or baseline comfort level  1/28/2025 2139 by Clara Espinal RN  Outcome: Progressing  1/28/2025 0826 by Jessi Durand RN  Outcome: Progressing     Problem: ABCDS Injury Assessment  Goal: Absence of physical injury  1/28/2025 2139 by Clara Espinal RN  Outcome: Progressing  1/28/2025 0826 by Jessi Durand RN  Outcome: Progressing     Problem: Nutrition Deficit:  Goal: Optimize 
  Problem: Chronic Conditions and Co-morbidities  Goal: Patient's chronic conditions and co-morbidity symptoms are monitored and maintained or improved  1/29/2025 1034 by Meg Irving RN  Outcome: Progressing  Note: Monitor and assess patient's chronic conditions and comorbid symptoms for stability, deterioration, or improvement; Collaborate with multidisciplinary team to address chronic and comorbid conditions and prevent exacerbation or deterioration   1/28/2025 2139 by Clara Espinal RN  Outcome: Progressing     Problem: Discharge Planning  Goal: Discharge to home or other facility with appropriate resources  1/29/2025 1034 by Meg Irving RN  Outcome: Progressing  Flowsheets (Taken 1/29/2025 1034)  Discharge to home or other facility with appropriate resources:   Identify barriers to discharge with patient and caregiver   Identify discharge learning needs (meds, wound care, etc)   Arrange for needed discharge resources and transportation as appropriate  Note: Discharge planning will begin or continue to meet patient goals. Patient will be discharged from [facility] to home or residence of choice in stable condition.   1/28/2025 2139 by Clara Espinal RN  Outcome: Progressing     Problem: Skin/Tissue Integrity  Goal: Absence of new skin breakdown  Description: 1.  Monitor for areas of redness and/or skin breakdown  2.  Assess vascular access sites hourly  3.  Every 4-6 hours minimum:  Change oxygen saturation probe site  4.  Every 4-6 hours:  If on nasal continuous positive airway pressure, respiratory therapy assess nares and determine need for appliance change or resting period.  1/29/2025 1034 by Meg Irving RN  Outcome: Progressing  1/28/2025 2139 by Clara Espinal RN  Outcome: Progressing     Problem: Safety - Adult  Goal: Free from fall injury  1/29/2025 1034 by Meg Irving RN  Outcome: Progressing  Flowsheets (Taken 1/29/2025 1034)  Free From Fall Injury:   Instruct family/caregiver on patient 
  Problem: Chronic Conditions and Co-morbidities  Goal: Patient's chronic conditions and co-morbidity symptoms are monitored and maintained or improved  Outcome: Progressing     Problem: Discharge Planning  Goal: Discharge to home or other facility with appropriate resources  Outcome: Progressing     Problem: Skin/Tissue Integrity  Goal: Absence of new skin breakdown  Description: 1.  Monitor for areas of redness and/or skin breakdown  2.  Assess vascular access sites hourly  3.  Every 4-6 hours minimum:  Change oxygen saturation probe site  4.  Every 4-6 hours:  If on nasal continuous positive airway pressure, respiratory therapy assess nares and determine need for appliance change or resting period.  Outcome: Progressing     Problem: Safety - Adult  Goal: Free from fall injury  Outcome: Progressing     Problem: Pain  Goal: Verbalizes/displays adequate comfort level or baseline comfort level  Outcome: Progressing     
  Problem: Chronic Conditions and Co-morbidities  Goal: Patient's chronic conditions and co-morbidity symptoms are monitored and maintained or improved  Outcome: Progressing  Flowsheets (Taken 1/22/2025 2232)  Care Plan - Patient's Chronic Conditions and Co-Morbidity Symptoms are Monitored and Maintained or Improved:   Monitor and assess patient's chronic conditions and comorbid symptoms for stability, deterioration, or improvement   Collaborate with multidisciplinary team to address chronic and comorbid conditions and prevent exacerbation or deterioration   Update acute care plan with appropriate goals if chronic or comorbid symptoms are exacerbated and prevent overall improvement and discharge  Note: Assess for deterioration and improvement.     Problem: Discharge Planning  Goal: Discharge to home or other facility with appropriate resources  Outcome: Progressing  Flowsheets (Taken 1/22/2025 2232)  Discharge to home or other facility with appropriate resources:   Identify barriers to discharge with patient and caregiver   Arrange for needed discharge resources and transportation as appropriate   Identify discharge learning needs (meds, wound care, etc)   Arrange for interpreters to assist at discharge as needed   Refer to discharge planning if patient needs post-hospital services based on physician order or complex needs related to functional status, cognitive ability or social support system  Note: Assess barriers to discharge teachings      Problem: Skin/Tissue Integrity  Goal: Absence of new skin breakdown  Description: 1.  Monitor for areas of redness and/or skin breakdown  2.  Assess vascular access sites hourly  3.  Every 4-6 hours minimum:  Change oxygen saturation probe site  4.  Every 4-6 hours:  If on nasal continuous positive airway pressure, respiratory therapy assess nares and determine need for appliance change or resting period.  Outcome: Progressing  Note: Monitor for new skin tear or breakdown.   
  Problem: Chronic Conditions and Co-morbidities  Goal: Patient's chronic conditions and co-morbidity symptoms are monitored and maintained or improved  Outcome: Progressing  Flowsheets (Taken 1/26/2025 1113 by Julien De Guzman GN)  Care Plan - Patient's Chronic Conditions and Co-Morbidity Symptoms are Monitored and Maintained or Improved: Monitor and assess patient's chronic conditions and comorbid symptoms for stability, deterioration, or improvement  Note: Pt condition monitored for changes.      Problem: Discharge Planning  Goal: Discharge to home or other facility with appropriate resources  Outcome: Progressing  Flowsheets (Taken 1/26/2025 1113 by Julien De Guzman GN)  Discharge to home or other facility with appropriate resources: Arrange for needed discharge resources and transportation as appropriate  Note: Case management to assist when needed.      Problem: Skin/Tissue Integrity  Goal: Absence of new skin breakdown  Description: 1.  Monitor for areas of redness and/or skin breakdown  2.  Assess vascular access sites hourly  3.  Every 4-6 hours minimum:  Change oxygen saturation probe site  4.  Every 4-6 hours:  If on nasal continuous positive airway pressure, respiratory therapy assess nares and determine need for appliance change or resting period.  Outcome: Progressing  Note: Pt skin assessed for redness and breakdown.      Problem: Safety - Adult  Goal: Free from fall injury  Outcome: Progressing  Flowsheets (Taken 1/26/2025 1947)  Free From Fall Injury:   Instruct family/caregiver on patient safety   Based on caregiver fall risk screen, instruct family/caregiver to ask for assistance with transferring infant if caregiver noted to have fall risk factors  Note: Assistance provided when needed.      Problem: Pain  Goal: Verbalizes/displays adequate comfort level or baseline comfort level  Outcome: Progressing  Flowsheets (Taken 1/27/2025 0106)  Verbalizes/displays adequate comfort level or baseline comfort 
  Problem: Chronic Conditions and Co-morbidities  Goal: Patient's chronic conditions and co-morbidity symptoms are monitored and maintained or improved  Outcome: Progressing  Flowsheets (Taken 1/27/2025 1945 by Adeline Desouza RN)  Care Plan - Patient's Chronic Conditions and Co-Morbidity Symptoms are Monitored and Maintained or Improved: Monitor and assess patient's chronic conditions and comorbid symptoms for stability, deterioration, or improvement     Problem: Discharge Planning  Goal: Discharge to home or other facility with appropriate resources  1/28/2025 0826 by Jessi Durand RN  Outcome: Progressing  1/27/2025 2257 by Adeline Desouza RN  Outcome: Progressing  Flowsheets  Taken 1/27/2025 2257  Discharge to home or other facility with appropriate resources:   Identify barriers to discharge with patient and caregiver   Arrange for needed discharge resources and transportation as appropriate   Identify discharge learning needs (meds, wound care, etc)  Taken 1/27/2025 1945  Discharge to home or other facility with appropriate resources:   Identify barriers to discharge with patient and caregiver   Arrange for needed discharge resources and transportation as appropriate  Note: Patient will be discharged when patient is medically stable     Problem: Skin/Tissue Integrity  Goal: Absence of new skin breakdown  Description: 1.  Monitor for areas of redness and/or skin breakdown  2.  Assess vascular access sites hourly  3.  Every 4-6 hours minimum:  Change oxygen saturation probe site  4.  Every 4-6 hours:  If on nasal continuous positive airway pressure, respiratory therapy assess nares and determine need for appliance change or resting period.  1/28/2025 0826 by Jessi Durand RN  Outcome: Progressing  1/27/2025 2257 by Adeline Desouza RN  Outcome: Progressing  Note: RN will turn patient every 2hours, skin will be assessed PRN     Problem: Safety - Adult  Goal: Free from fall injury  1/28/2025 0826 by 
  Problem: Discharge Planning  Goal: Discharge to home or other facility with appropriate resources  Outcome: Progressing  Flowsheets  Taken 1/27/2025 2257  Discharge to home or other facility with appropriate resources:   Identify barriers to discharge with patient and caregiver   Arrange for needed discharge resources and transportation as appropriate   Identify discharge learning needs (meds, wound care, etc)  Taken 1/27/2025 1945  Discharge to home or other facility with appropriate resources:   Identify barriers to discharge with patient and caregiver   Arrange for needed discharge resources and transportation as appropriate  Note: Patient will be discharged when patient is medically stable     Problem: Skin/Tissue Integrity  Goal: Absence of new skin breakdown  Description: 1.  Monitor for areas of redness and/or skin breakdown  2.  Assess vascular access sites hourly  3.  Every 4-6 hours minimum:  Change oxygen saturation probe site  4.  Every 4-6 hours:  If on nasal continuous positive airway pressure, respiratory therapy assess nares and determine need for appliance change or resting period.  Outcome: Progressing  Note: RN will turn patient every 2hours, skin will be assessed PRN     Problem: Safety - Adult  Goal: Free from fall injury  Outcome: Progressing  Flowsheets (Taken 1/27/2025 2257)  Free From Fall Injury:   Instruct family/caregiver on patient safety   Based on caregiver fall risk screen, instruct family/caregiver to ask for assistance with transferring infant if caregiver noted to have fall risk factors  Note: Call bell activated, call bell and bed side table within reach.     Problem: Pain  Goal: Verbalizes/displays adequate comfort level or baseline comfort level  Outcome: Progressing  Flowsheets (Taken 1/27/2025 2257)  Verbalizes/displays adequate comfort level or baseline comfort level:   Encourage patient to monitor pain and request assistance   Assess pain using appropriate pain scale   
  Problem: Occupational Therapy - Adult  Goal: By Discharge: Performs self-care activities at highest level of function for planned discharge setting.  See evaluation for individualized goals.  Description: Occupational Therapy Goals:  Initiated 1/27/2025 to be met within 7-10 days.    1.  Patient will perform bathing with minimal assistance seated EOB.   2.  Patient will perform bed mobility to sit EOB in order to participate in ADL tasks with minimal assistance.  3.  Patient will perform grooming  with modified independence at EOB.  4.  Patient will perform toilet transfers with moderate assistance  5.  Patient will perform all aspects of toileting with moderate assistance.  6.  Patient will participate in upper extremity therapeutic exercise/activities with supervision/set-up for 8-10 minutes to increase strength/endurance for ADLs.    7.  Patient will utilize energy conservation techniques during functional activities with verbal, visual, and tactile cues.    PLOF: Pt is a poor historian, unable to gather PLOF information. Previous resident of [place].     Outcome: Progressing    OCCUPATIONAL THERAPY EVALUATION    Patient: Stefano Begum JrKavya (84 y.o. male)  Date: 1/27/2025  Primary Diagnosis: PAD (peripheral artery disease) (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 Pt asleep when OT arrived, but easily aroused to voice only. Pt presents with increased confusion, oriented to self only on first attempt. Began to answer questions with nonsensical statements (I.e. \"that water over there\" in response to asking where we were). Left room to speak to RN regarding confusion. Returned to room and pt was able to 
  Problem: Occupational Therapy - Adult  Goal: By Discharge: Performs self-care activities at highest level of function for planned discharge setting.  See evaluation for individualized goals.  Description: Occupational Therapy Goals:  Initiated 1/27/2025 to be met within 7-10 days.    1.  Patient will perform bathing with minimal assistance seated EOB.   2.  Patient will perform bed mobility to sit EOB in order to participate in ADL tasks with minimal assistance.  3.  Patient will perform grooming  with modified independence at EOB.  4.  Patient will perform toilet transfers with moderate assistance  5.  Patient will perform all aspects of toileting with moderate assistance.  6.  Patient will participate in upper extremity therapeutic exercise/activities with supervision/set-up for 8-10 minutes to increase strength/endurance for ADLs.    7.  Patient will utilize energy conservation techniques during functional activities with verbal, visual, and tactile cues.    PLOF: Pt is a poor historian, unable to gather PLOF information. Previous resident of [place].     Outcome: Progressing   OCCUPATIONAL THERAPY TREATMENT    Patient: Stefano Begum JrKavya (84 y.o. male)  Date: 1/29/2025  Diagnosis: PAD (peripheral artery disease) ([facility]) [I73.9]  Dry gangrene ([facility]) [I96] PAD (peripheral artery disease) ([facility])  Procedure(s) (LRB):  LEFT TROCHANTERIC FEMORAL NAIL HARDWARE REMOVAL; C-ARM; [DEPUY SYNTHES ORTHO] (Left)  LEFT LEG ABOVE KNEE AMPUTATION (Left) 4 Days Post-Op  Precautions: Fall Risk,  , Left Lower Extremity Weight Bearing: Non Weight Bearing,  ,  ,  ,  ,      Chart, occupational therapy assessment, plan of care, and goals were reviewed.  ASSESSMENT:  Pt presented supine in bed upon entry and was found to be soiled from urine. MAX A encouragement for minimal participation. Pt continues to demo confused w/ agitation at times, pt with poor carryover throughout session. He required MOD A rolling L/R and MAX A for minerva area 
  Problem: Physical Therapy - Adult  Goal: By Discharge: Performs mobility at highest level of function for planned discharge setting.  See evaluation for individualized goals.  Description: Initiated  1/27/25  to be met within 7-10 days.    1.  Patient will move from supine to sit and sit to supine , scoot up and down, and roll side to side in bed with supervision/set-up.    2.  Patient will transfer from bed to chair and chair to bed with moderate assistance  using the least restrictive device.  3.  Patient will perform sit to stand with moderate assistance .  4.  Patient will ambulate with moderate assistance  for 3 feet with the least restrictive device.   5.      PLOF: Resident at [place]. Pt reports ambulation recently, however poor historian overall.     Outcome: Progressing     PHYSICAL THERAPY TREATMENT    Patient: Stefano Begum Jr. (84 y.o. 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  Pt reoriented to place and L AKA procedure performed 3 days ago.  Pt agitated throughout treatment session, resistive to all suggestions for mobility, eventually agreeing to sit EOB.  Pt required MaxA for supine to sit transfer, sat EOB for 2-3 min with use of UE support on b/l bed rails.  Pt adamantly refused to stand despite max encouragement and began to lay himself back in to the bed.  Pt was totalA to scoot to HOB, performed rolling R/L with ModA to position john pads and left with all needs met and call bell within reach.        Progression 
  Problem: Physical Therapy - Adult  Goal: By Discharge: Performs mobility at highest level of function for planned discharge setting.  See evaluation for individualized goals.  Description: Initiated  1/27/25  to be met within 7-10 days.    1.  Patient will move from supine to sit and sit to supine , scoot up and down, and roll side to side in bed with supervision/set-up.    2.  Patient will transfer from bed to chair and chair to bed with moderate assistance  using the least restrictive device.  3.  Patient will perform sit to stand with moderate assistance .  4.  Patient will ambulate with moderate assistance  for 3 feet with the least restrictive device.   5.      PLOF: Resident at [place]. Pt reports ambulation recently, however poor historian overall.     Outcome: Progressing   PHYSICAL THERAPY EVALUATION    Patient: Stefano Begum Jr. (84 y.o. 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 Pt received in bed in [facility] and educated on PT role and evaluation process,agreeable with encouragement. Pt alert and oriented to place, self, and situation, however states year is 1944; reoriented. Pt does report walking yesterday, despite surgery and PLOF is questionable so obtained from chart review. Pt becomes slightly agitated with mobility, yanking blankets from being moved, and aggressively moving/throwing wedges. Pt begins asking where his stuff is, nursing notified. Pt rolls with min A to roll and max A to scoot up in bed for proper positioning however quickly slumps down into bed and demands this 
Problem: Physical Therapy - Adult  Goal: By Discharge: Performs mobility at highest level of function for planned discharge setting.  See evaluation for individualized goals.  Description: Initiated  1/27/25  to be met within 7-10 days.    1.  Patient will move from supine to sit and sit to supine , scoot up and down, and roll side to side in bed with supervision/set-up.    2.  Patient will transfer from bed to chair and chair to bed with moderate assistance  using the least restrictive device.  3.  Patient will perform sit to stand with moderate assistance .  4.  Patient will ambulate with moderate assistance  for 3 feet with the least restrictive device.   5.      PLOF: Resident at [place]. Pt reports ambulation recently, however poor historian overall.     Outcome: Progressing   PHYSICAL THERAPY TREATMENT    Patient: Stefano Begum Jr. (84 y.o. 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 Noted to be incontinent of urine. Mod A for rolling to provide clean-up. Confused and poor command following throughout session. Decreased insight to deficits. Poor awareness of safety. Poor motivation to progress with mobility and require decreased assist. Completed reaching tasks in support sitting. Cues to use BUE. Seated in bed with HOB elevated. Educated on need for RN assistance with mobility; verbalized understanding. Call bell in reach.     Progression toward goals:   []      Improving appropriately and progressing toward goals  []      Improving slowly and progressing toward goals  [x]      Not making progress toward goals     PLAN:  Patient continues to benefit from skilled intervention to address the above 
Educated on L LE movement and tactile desensitization in the meantime for assisting with pain control. He was assisted to EOB MAX A in prep for functional tasks. Pt sat EOB ~ 3 mins with B UE support on SR's to maintain balance. Pt declining standing this date despite much encouragement and education. Pt was returned back to supine and positioned for comfort. He was left with all needs within reach and bed alarm active. RN made aware.   Progression toward goals:  []          Improving appropriately and progressing toward goals  [x]          Improving slowly and progressing toward goals  []          Not making progress toward goals and plan of care will be adjusted     PLAN:  Patient continues to benefit from skilled intervention to address the above impairments.  Continue treatment per established plan of care.    Further Equipment Recommendations for Discharge: [facility] bed,     [facility]: AM-PAC Inpatient Daily Activity Raw Score: 16    Current research shows that an AM-PAC score of 17 or less is not associated with a discharge to the patient's home setting.    This AMPA score should be considered in conjunction with interdisciplinary team recommendations to determine the most appropriate discharge setting. Patient's social support, diagnosis, medical stability, and prior level of function should also be taken into consideration.     SUBJECTIVE:   Patient stated, \"When I hear that horn, I will stand.\"    OBJECTIVE DATA SUMMARY:   Cognitive/Behavioral Status:  Orientation  Overall Orientation Status: Impaired  Orientation Level: Oriented to person;Disoriented to time;Disoriented to situation;Disoriented to place  Cognition  Overall Cognitive Status: Exceptions  Arousal/Alertness: Appears intact  Following Commands: Follows one step commands with increased time  Attention Span: Attends with cues to redirect  Memory: Impaired  Safety Judgement: Decreased awareness of need for assistance;Decreased awareness of need for

## 2025-01-29 NOTE — CARE COORDINATION
Evette CM Specialist Courtney Served CM that auth for SNF has been approved.       CM Perfect Served Dr Byrne and updated that auth for SNF [facility] has been approved, and CM will need Discharge Summary for SNF.         NEHAL called and spoke with Nisreen with SNF [facility], and updated that auth for SNF has been approval, and will upload the auth approval note to [facility], once in the chart.   Nisreen said they can take patient today for SNF.           Shawnee Mancera, RN  Case Management [phone]

## 2025-01-29 NOTE — CARE COORDINATION
SNF Note uploaded to [facility] for SNF to [facility].          Shawnee Mancera, RN  Case Management [phone]

## 2025-01-29 NOTE — CARE COORDINATION
CM Perfect Served CM Specialists Livier and Evette, and requested assistance with Medical Transport for today if possible for patient to discharge to [facility] before 7 pm, facility cut off time, and that transport note is in the chart.           Shawnee Mancera RN  Case Management [phone]

## 2025-01-29 NOTE — CARE COORDINATION
Called the number for [facility] [phone] and spoke with Hola to schedule BLS stretcher transportation to [facility], SNF to LTC. The address is: [address] [place] [address] and phone number is: [phone]. The pickup times is schedule for 5:30 PM. The LEG ID is [id number].

## 2025-01-29 NOTE — CARE COORDINATION
CM placed Transport Envleope with PCS form, 2 facesheets, Discharge Summary, and copy of DDNR form on front of patient's chart for Medical Transport to transport patient to SNF [facility] today for discharge.       NEHAL Valdez Served Dr Chen and asked for hard script for pain medicine, needed for SNF, when possible.                 Shawnee Mancera, RN  Case Management [phone]

## 2025-01-29 NOTE — CARE COORDINATION
[facility] Care Coordinator let CM know that Dr Chen said patient is medically ready for discharge now.       CM called and spoke with Nisreen with [facility] and updated, said patient would need to be at the facility before 7 pm today.               Shawnee Mancera, RN  Case Management [phone]

## 2025-01-29 NOTE — CARE COORDINATION
[facility] Authorization  1/29/2025, 9:39 AM    Patient Name: Stefano Begum Jr.                   YOB: 1940      Received via: [facility]  Auth ID:  [id number]   Plan Auth ID: [id number]   Service:  [facility]  Approval Dates:   01/28/2025-01/30/2025   Next Review Date: 01/30/2025        Evette Lucas  Case Management Department  Ph: [phone] Fax: [phone]

## 2025-01-29 NOTE — CARE COORDINATION
Requested Case Management specialist to assist with transportation to:      [facility], SNF to LTC        Address is:      [address] [place] [address]           and phone number is:    [phone]        Any steps at the residence? No  Patient will require BLS transport.   Pt requires Stretcher If stretcher, reason: PAD, Surgery Left AKA, Hx of right foot all toes amputated, Hx A Fib, Impaired Mobility  Patient is currently requiring oxygen No   Height: 5\"10   Weight: 102 lbs  Pt is on isolation: No   Is the pt ready now? No  Requested time:  Tomorrow Thursday at 2 pm.   PCS Faxed: No  Insurance verified on face sheet: Yes  Auth needed for transport: No  CM completed PCS/ Envelope and placed on chart.

## 2025-01-29 NOTE — CARE COORDINATION
CM uploaded Discharge Summary to [facility].             Shawnee Mancera, RN  Case Management [phone]

## 2025-01-29 NOTE — CARE COORDINATION
Dr Chen let CM know that patient should be ready for discharge tomorrow.         NEHAL Perfect Served CM Specialists Livier and Evette and requested assistance with Medical Transport for tomorrow for [facility] [place], and that transport note is in the chart.         NEHAL spoke with Nisreen with [facility] [place] [phone], and updated that patient should be ready for discharge tomorrow, and that CM is requesting transport for 2 pm tomorrow for SNF.               Shawnee Mancera, RN  Case Management [phone]

## 2025-01-29 NOTE — DISCHARGE SUMMARY
[facility]    Discharge Summary    Patient: Stefano Begum Jr. MRN: [id number]  [facility]: [id number]    YOB: 1940  Age: 84 y.o.  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[facility]) [I96]    Discharge Diagnoses:    Hospital Problems             Last Modified POA    * (Principal) PAD (peripheral artery disease) ([facility]) 1/23/2025 Yes    Dry gangrene ([facility]) 1/23/2025 Yes    Severe protein-calorie malnutrition ([facility]) 1/23/2025 Yes    Encounter for assessment of healthcare decision-making capacity 1/23/2025 Yes    Critical limb ischemia of right lower extremity ([facility]) 1/23/2025 Yes    Orthopedic hardware present 1/25/2025 Yes    Acute lower limb ischemia 1/26/2025 Yes     Discharge Condition: Stable    Discharge To: Skilled nursing facility    Consults: Cardiology, Infectious Disease, Orthopedics, and Vascular Surgery    HPI:   Per admission HPI   84-year-old male past medical history of severe peripheral arterial disease, dry gangrene of the left foot, status post TMA amputation of the right foot presented to the [facility] ED for evaluation of left lower extremity.  At the bedside describes left lower extremity pain to be well-controlled with oral oxycodone.  Patient has been battling nonhealing left foot wound for 2 months.  He has been previously evaluated here with CT of the left lower extremity last month.  No interventions in the outside ED.  Prescribed to have \"granulation forefoot\".  Apparently has been on antibiotics for osteomyelitis.   Outside ED provider ensured no cellulitic tracking or not meeting sepsis criteria.  Transferred here for further multidisciplinary evaluation patient is now amenable to left lower extremity amputation if indicated.    Hospital Course:   Upon admission, patient was evaluated by cardiology for clearance prior to eventual surgery.

## 2025-01-29 NOTE — CARE COORDINATION
Discharge order noted for today. Patient has been accepted to [facility] skilled nursing facility. Confirmed with Nisreen that bed is available today.  Met with patient and is agreeable to the transition plan today. **Insurance authorization has been obtained.** Transport to facility has been arranged through [facility]  at 5:30 pm time. Patient's discharge summary has been forwarded to skilled nursing facility via CarePort and Epic, and was placed in Transport Envelope to go with patient to SNF. Bedside RN, Meg, has been updated to the transition plan. Discharge information has been updated on the AVS.  Please call report to [phone].          Shawnee Mancera RN  Case Management [phone]

## 2025-01-29 NOTE — CARE COORDINATION
CM faxed PCS form and facesheet to [facility] [phone], and confirmation fax letter received.       NEHAL Valdez Served [facility] Specialist and updated.               Shawnee Mancera RN  Case Management [phone]

## 2025-01-29 NOTE — CARE COORDINATION
NEHAL faxed copy hard script for Oxycodone and facesheet to Nisreen with [facility] to [phone].               Shawnee Macnera RN  Case Management [phone]

## 2025-01-29 NOTE — CARE COORDINATION
Transport scheduled for today at 5:30 pm with [facility] per Evette CALVERT Specialist note.         NEHAL called and [facility], and updated that Transport is scheduled for 5:30 pm today, and that Discharge Summary is in the chart.               Shawnee Mancera, RN  Case Management [phone]

## 2025-01-29 NOTE — DISCHARGE INSTRUCTIONS
Continue present medications.  Follow-up with your primary care provider and vascular surgery in 2 to 3 weeks.  Recommend referral to hematology oncology to monitor your blood cell counts.

## 2025-01-29 NOTE — CARE COORDINATION
Auth for SNF approved through 01/30/2025.               Shawnee Mancera RN  Case Management [phone]

## 2025-01-29 NOTE — CARE COORDINATION
01/29/25 1554   IMM Letter   IMM Letter given to Patient/Family/Significant other/Guardian/POA/by: Shawnee Mancera   IMM Letter date given: 01/29/25   IMM Letter time given: 1533     Patient waived the 4 hour right to appeal the discharge.         Shawnee Mancera RN  Case Management [phone]

## 2025-01-29 NOTE — CARE COORDINATION
Auth approval note for SNF uploaded to [facility].               Shawnee Mancera, RN  Case Management [phone]

## 2025-01-29 NOTE — CARE COORDINATION
CM spoke with patient at the bedside, and updated on discharge plan for today, back to [facility], with Medical Transport at 5:30 pm.  Patient is agreeaable to the discharge plan for today.           Shawnee Mancera RN  Case Management [phone]

## 2025-01-29 NOTE — CARE COORDINATION
Transition of Care Plan to SNF/Rehab      Patient Name: Stefano Begum Jr.                   YOB: 1940    SNF/Rehab Transition:  Patient has been accepted to [facility] SNF/Rehab and meets criteria for admission. Confirmed with Nisreen that bed is available for today.   Patient will transported by [facility]  and expected to leave at 5:30 pm.  Met with patient and is agreeable to the transition plan.    Medicaid Long-term Services and Supports(LTSS) screening completion: NA    Three Inpatient Midnights for Medicare: Yes    Current Code Status:   Code Status: DNR     Last Weight:   Wt Readings from Last 1 Encounters:   01/24/25 46.6 kg (102 lb 11.8 oz)       Weightbearing Status: NA    IV Medication, IV Site, Device Type: No    Dialysis: No      O2 Needs (including O2, Bipap, Cpap, ect.): No    Covid Vaccine Dates/ if known:    Internal Administration   First Dose      Second Dose           Last COVID Lab No results found for: \"SARS-COV-2\"         Last COVID Lab No results found for: \"SARS-COV-2\", \"SARS-COV-2 RNA\", \"SARS-COV-2 RNA, RT PCR\", \"SARS-COV-2, JESUS\", \"SARS-COV-2, NAAT\", \"SARS-COV-2 BY PCR\", \"RAPID\", \"SARS-COV-2, RAPID\", \"SALIVA\", \"SARS-COV-2, SALIVA\"           Current Diet: ADULT DIET; Regular  ADULT ORAL NUTRITION SUPPLEMENT; Breakfast, Lunch, Dinner; Standard High Calorie/High Protein Oral Supplement    Wound Vac or Other Equipment Needs: NA    Patient requires Isolation: No    Follow-up Appointment needed or scheduled:   Follow up With Specialties Details Why Contact Info      [facility]     [address] [place] [id number]  [phone]     Mignon Foster PA Physician Assistant Schedule an appointment as soon as possible for a visit in 1 week(s)   [facility] [address] [phone]        Merlin Jimenez MD Vascular Surgery Schedule an

## 2025-01-29 NOTE — CARE COORDINATION
Requested Case Management specialist to assist with transportation to:        [facility], SNF to LTC           Address is:        [address] [place] [address]               and phone number is:     [phone]           Any steps at the residence? No  Patient will require BLS transport.   Pt requires Stretcher If stretcher, reason: PAD, Surgery Left AKA, Hx of right foot all toes amputated, Hx A Fib, Impaired Mobility  Patient is currently requiring oxygen No   Height: 5\"10   Weight: 102 lbs  Pt is on isolation: No   Is the pt ready now? No  Requested time:  3:30 pm today (has to be at the facility before 7 pm)    PCS Faxed: No  Insurance verified on face sheet: Yes  Auth needed for transport: No  CM completed PCS/ Envelope and placed on chart.

## 2025-01-30 NOTE — PROGRESS NOTES
Stefano TAYLOR Shy Riddle was seen this morning, as well as yesterday.  Yesterday his dressings are clean and dry, they remained in place, to his left lower extremity.    Vitals:    01/28/25 0400   BP: 128/62   Pulse: 80   Resp: 16   Temp: 98.2 °F (36.8 °C)   SpO2: 92%         He is alert, does follow commands.    Left AKA: Incision line looks very good, the dressings have been removed by the previous team.  There are no dressings on his left AKA site, at this current time.  His left hip intertrochanteric incisions that were used to remove the mia, the 2 incisions proximally, look very good there is no redness erythema no drainage no ecchymosis no signs of any infection.    Impression: 84-year-old male, multimedical problems, with ischemic left lower extremity, status post left AKA:    1.  Heart removal left femur: Continue dressing changes, left hip, will recommend honeycomb type dressings, left hip.  No further orthopedic invention required at this time.  2.  Left AKA: Dressing changes, per vascular  3.  Continue medical current medical care      ISAIAH Villarreal MD  1/28/2025 6:03 AM    
   PROGRESS NOTE      Patient: Stefano Begum Jr.  MRN: [id number]  SSN: [id number]   YOB: 1940  Age: 84 y.o.  Sex: male   Admit Date: 1/22/2025 LOS:  LOS: 4 days      POD# 1   S/P Procedure(s):S/P Procedure(s):  LEFT TROCHANTERIC FEMORAL NAIL HARDWARE REMOVAL; C-ARM; [DEPUY SYNTHES ORTHO]  LEFT LEG ABOVE KNEE AMPUTATION     ASSESSMENT/PLAN     Stefano Begum Jr. s resting comfortably at this current time, does admit to having pain, the left lower extremity AKA site. I was made aware just this morning, by Dr. North, informed me of this: Stefano Begum Jr. Had a surgery drape on his left arm and a surgical clip on the drape also hand clipped his volar left thenar region. (Skin is intact, no wound, slight pink demarcation): will keep clean and open to air.      I spoke with Stefano Begum Jr. , explained him that during surgery of 1/25/2025, a sterile drape was placed on his left hand, and a sterile clip, had clipped the drape and part of his left volar thenar region.  He understands that we will watch his skin area, and it should heal.  There are no open wounds, skin got pinched, 1 focal region along the thenar region.    1.  Left intramedullary mia removal: Continue dressings, left hip:  2.  Left AKA, per vascular surgery: Continue dressings, per vascular team  3.  Case management, discharge disposition    OBJECTIVE EXAMINATION       Vitals:    01/26/25 0500   BP:    Pulse: 89   Resp:    Temp:    SpO2:      Appearance: Alert, well appearing and pleasant patient who is in no distress, oriented to person, place, and who follows commands.This patient is accompanied in the examination room by his self.  Psychiatric: Affect and mood are appropriate. Respiratory: Breathing is unlabored without accessory chest muscle use.     Left lower extremity: AKA dressings are in place, the dressings, do incorporate the lateral proximal surgical incisions.  Thus, my proximal lateral surgical incisions are covered at this time, 
4 Eyes Skin Assessment     NAME:  Stefano Begum Jr.  YOB: 1940  MEDICAL RECORD NUMBER:  [id number]    The patient is being assessed for  Shift Handoff    I agree that at least one RN has performed a thorough Head to Toe Skin Assessment on the patient. ALL assessment sites listed below have been assessed.      Areas assessed by both nurses:    Head, Face, Ears, Shoulders, Back, Chest, Arms, Elbows, Hands, Sacrum. Buttock, Coccyx, Ischium, Legs. Feet and Heels, and Under Medical Devices         Does the Patient have a Wound? Yes wound(s) were present on assessment. LDA wound assessment was Initiated and completed by RN       Tr Prevention initiated by RN: Yes  Wound Care Orders initiated by RN: Yes    Pressure Injury (Stage 3,4, Unstageable, DTI, NWPT, and Complex wounds) if present, place Wound referral order by RN under : Yes    New Ostomies, if present place, Ostomy referral order under : Yes     Nurse 1 eSignature: Electronically signed by Lilo Dash RN on 1/23/25 at 6:30 AM EST    **SHARE this note so that the co-signing nurse can place an eSignature**    Nurse 2 eSignature: {Esignature:[id number]}   
4 Eyes Skin Assessment     NAME:  Stefano Begum Jr.  YOB: 1940  MEDICAL RECORD NUMBER:  [id number]    The patient is being assessed for  {Reason for Assessment:31202}    I agree that at least one RN has performed a thorough Head to Toe Skin Assessment on the patient. ALL assessment sites listed below have been assessed.      Areas assessed by both nurses:    Head, Face, Ears, Shoulders, Back, Chest, Arms, Elbows, Hands, Sacrum. Buttock, Coccyx, Ischium, Legs. Feet and Heels, and Under Medical Devices         Does the Patient have a Wound? Yes wound(s) were present on assessment. LDA wound assessment was Initiated and completed by RN       Tr Prevention initiated by RN: Yes  Wound Care Orders initiated by RN: Yes    Pressure Injury (Stage 3,4, Unstageable, DTI, NWPT, and Complex wounds) if present, place Wound referral order by RN under : No    New Ostomies, if present place, Ostomy referral order under : No     Nurse 1 eSignature: Electronically signed by CHUY Hess on 1/27/25 at 7:29 AM EST    **SHARE this note so that the co-signing nurse can place an eSignature**    Nurse 2 eSignature: {Esignature:[id number]}   
4 Eyes Skin Assessment     NAME:  Stefano Begum Jr.  YOB: 1940  MEDICAL RECORD NUMBER:  [id number]    The patient is being assessed for  Shift Handoff    I agree that at least one RN has performed a thorough Head to Toe Skin Assessment on the patient. ALL assessment sites listed below have been assessed.      Areas assessed by both nurses:    Head, Face, Ears, Shoulders, Back, Chest, Arms, Elbows, Hands, Sacrum. Buttock, Coccyx, Ischium, Legs. Feet and Heels, and Under Medical Devices         Does the Patient have a Wound? Yes wound(s) were present on assessment. LDA wound assessment was Initiated and completed by RN       Tr Prevention initiated by RN: Yes  Wound Care Orders initiated by RN: No    Pressure Injury (Stage 3,4, Unstageable, DTI, NWPT, and Complex wounds) if present, place Wound referral order by RN under : No    New Ostomies, if present place, Ostomy referral order under : No     Nurse 1 eSignature: Electronically signed by CHUY Hess on 1/25/25 at 7:29 AM EST    **SHARE this note so that the co-signing nurse can place an eSignature**    Nurse 2 eSignature: {Esignature:[id number]}   
4 Eyes Skin Assessment     NAME:  Stefano Begum Jr.  YOB: 1940  MEDICAL RECORD NUMBER:  [id number]    The patient is being assessed for  Shift Handoff    I agree that at least one RN has performed a thorough Head to Toe Skin Assessment on the patient. ALL assessment sites listed below have been assessed.      Areas assessed by both nurses:    Head, Face, Ears, Shoulders, Back, Chest, Arms, Elbows, Hands, Sacrum. Buttock, Coccyx, Ischium, Legs. Feet and Heels, and Under Medical Devices         Does the Patient have a Wound? Yes wound(s) were present on assessment. LDA wound assessment was Initiated and completed by RN       Tr Prevention initiated by RN: Yes  Wound Care Orders initiated by RN: Yes    Pressure Injury (Stage 3,4, Unstageable, DTI, NWPT, and Complex wounds) if present, place Wound referral order by RN under : No    New Ostomies, if present place, Ostomy referral order under : No     Nurse 1 eSignature: Electronically signed by CHUY Hess on 1/26/25 at 7:14 AM EST    **SHARE this note so that the co-signing nurse can place an eSignature**    Nurse 2 eSignature: Electronically signed by CHUY Barnett on 1/26/25 at 7:46 AM EST   
4 Eyes Skin Assessment     NAME:  Stefano Begum Jr.  YOB: 1940  MEDICAL RECORD NUMBER:  [id number]    The patient is being assessed for  Shift Handoff    I agree that at least one RN has performed a thorough Head to Toe Skin Assessment on the patient. ALL assessment sites listed below have been assessed.      Areas assessed by both nurses:    Head, Face, Ears, Shoulders, Back, Chest, Arms, Elbows, Hands, Sacrum. Buttock, Coccyx, Ischium, Legs. Feet and Heels, and Under Medical Devices         Does the Patient have a Wound? Yes wound(s) were present on assessment. LDA wound assessment was Initiated and completed by RN       Tr Prevention initiated by RN: Yes  Wound Care Orders initiated by RN: No    Pressure Injury (Stage 3,4, Unstageable, DTI, NWPT, and Complex wounds) if present, place Wound referral order by RN under : No    New Ostomies, if present place, Ostomy referral order under : No     Nurse 1 eSignature: Electronically signed by CHUY Barnett on 1/26/25 at 6:42 PM EST    **SHARE this note so that the co-signing nurse can place an eSignature**    Nurse 2 eSignature: {Esignature:[id number]}   
Advance Care Planning   Healthcare Decision Maker:    Primary Decision Maker: Dinorah Begum - Spouse - [phone]    Click here to complete Healthcare Decision Makers including selection of the Healthcare Decision Maker Relationship (ie \"Primary\").    Spiritual Health History and Assessment/Progress Note  [facility]    Spiritual/Emotional Needs,  ,  ,      Name: Stefano Begum Jr. MRN: [id number]    Age: 84 y.o.     Sex: male   Language: English   Yazdanism: Other   PAD (peripheral artery disease) ([facility])     Date: 1/23/2025            Total Time Calculated: 7 min              Spiritual Assessment began in [facility] SURGICAL        Referral/Consult From: Rounding   Encounter Overview/Reason: Spiritual/Emotional Needs  Service Provided For: Patient    Marilyn, Belief, Meaning:   Patient has beliefs or practices that help with coping during difficult times  Family/Friends No family/friends present      Importance and Influence:  Patient has spiritual/personal beliefs that influence decisions regarding their health  Family/Friends No family/friends present    Community:  Patient feels well-supported. Support system includes: Spouse/Partner and Children  Family/Friends No family/friends present    Assessment and Plan of Care:     Patient Interventions include: Facilitated expression of thoughts and feelings, Provided sacramental/Sikh ritual, and Facilitated life review and/ or legacy  Family/Friends Interventions include: No family/friends present    Patient Plan of Care: Spiritual Care available upon further referral  Family/Friends Plan of Care: No family/friends present    Electronically signed by Chaplain Isabel on 1/23/2025 at 9:08 AM          
Attempt to put dressing on pt L AKA incision but pt refused it. Pt education provided. Incision clean dry and intact with staples.   
Bedside and Verbal shift change report given to OSVALDO Weaver (oncoming nurse) by OSVALDO Elizabeth (offgoing nurse). Report included the following information Nurse Handoff Report, Index, ED Encounter Summary, ED SBAR, Adult Overview, Surgery Report, Intake/Output, MAR, Recent Results, Med Rec Status, and Cardiac Rhythm   .     
[facility]      No events overnight.  Minimal pain of the LLE. No interest in prosthetic eval.       PE:   Blood pressure 128/62, pulse 80, temperature 98.2 °F (36.8 °C), temperature source Oral, resp. rate 16, height 1.778 m (5' 10\"), weight 46.6 kg (102 lb 11.8 oz), SpO2 92%.      Intake/Output Summary (Last 24 hours) at 1/28/2025 0806  Last data filed at 1/28/2025 0523  Gross per 24 hour   Intake 240 ml   Output 1800 ml   Net -1560 ml       NAD  Breathing comfortably  LLE AKA stump c/d/I. No erythema or odor. Scant serosanguinous drainage on the underlying pad. No active drainage. Appropriately tender. Dressing was not present at the time of my exam.           Lab Results   Component Value Date    WBC 6.8 01/28/2025    HGB 9.1 (L) 01/28/2025    HCT 29.8 (L) 01/28/2025    MCV 89.8 01/28/2025    PLT 76 (L) 01/28/2025     Lab Results   Component Value Date/Time     01/28/2025 04:51 AM    K 3.7 01/28/2025 04:51 AM     01/28/2025 04:51 AM    CO2 30 01/28/2025 04:51 AM    BUN 14 01/28/2025 04:51 AM    CREATININE 0.65 01/28/2025 04:51 AM    GLUCOSE 96 01/28/2025 04:51 AM    GLUCOSE 105 01/22/2025 01:16 PM    CALCIUM 8.0 01/28/2025 04:51 AM    LABGLOM >90 01/28/2025 04:51 AM            IMPRESSION:   POD 3 s/p L AKA for chronic LLE gangrene. Doing well. No interest in a prosthetic at this time.       PLAN:   NWB to the RLE for 3 months.   Continue work with PT.   Recommend DSD to the LLE for comfort only at this point.   Will schedule an appt in our office for follow up 2-3 weeks after he is discharged.       Merlin Jimenez MD  Vascular Surgeon  [facility]    
[facility]      No significant events overnight.        PE:   Blood pressure 122/70, pulse 90, temperature 98.8 °F (37.1 °C), temperature source Oral, resp. rate 18, height 1.778 m (5' 10\"), weight 46.6 kg (102 lb 11.8 oz), SpO2 95%.      Intake/Output Summary (Last 24 hours) at 1/29/2025 1039  Last data filed at 1/29/2025 0447  Gross per 24 hour   Intake 360 ml   Output 1500 ml   Net -1140 ml       NAD  Breathing comfortably  Left AKA incision is intact and healing well. No signs of hematoma or infection.       Lab Results   Component Value Date    WBC 5.7 01/29/2025    HGB 8.9 (L) 01/29/2025    HCT 28.5 (L) 01/29/2025    MCV 86.9 01/29/2025    PLT 70 (L) 01/29/2025     Lab Results   Component Value Date/Time     01/29/2025 01:40 AM    K 3.4 01/29/2025 01:40 AM     01/29/2025 01:40 AM    CO2 29 01/29/2025 01:40 AM    BUN 21 01/29/2025 01:40 AM    CREATININE 0.58 01/29/2025 01:40 AM    GLUCOSE 88 01/29/2025 01:40 AM    GLUCOSE 105 01/22/2025 01:16 PM    CALCIUM 7.9 01/29/2025 01:40 AM    LABGLOM >90 01/29/2025 01:40 AM            IMPRESSION/PLAN:   POD#4 s/p L AKA for chronic LLE gangrene. Doing well.       NWB to the RLE for 3 months.   Continue work with PT.   Will schedule for outpt follow up 2-3 weeks after discharge.       
[facility]    POD #2 s/p left AKA after  intramedullary nail removal  Seen resting comfortably in bed without distress, however he removed the left stump dressing overnight.      PE:   Blood pressure 120/80, pulse 87, temperature 98.4 °F (36.9 °C), temperature source Oral, resp. rate 16, height 1.778 m (5' 10\"), weight 46.6 kg (102 lb 11.8 oz), SpO2 94%.      Intake/Output Summary (Last 24 hours) at 1/27/2025 1328  Last data filed at 1/27/2025 0853  Gross per 24 hour   Intake 892.11 ml   Output 600 ml   Net 292.11 ml     NAD  Breathing comfortably  Left AKA incision is intact and healing well. No signs of hematoma.      Lab Results   Component Value Date    WBC 8.2 01/27/2025    HGB 8.7 (L) 01/27/2025    HCT 29.5 (L) 01/27/2025    MCV 91.9 01/27/2025    PLT 82 (L) 01/27/2025     Lab Results   Component Value Date/Time     01/27/2025 12:24 AM    K 4.5 01/27/2025 12:24 AM     01/27/2025 12:24 AM    CO2 29 01/27/2025 12:24 AM    BUN 16 01/27/2025 12:24 AM    CREATININE 0.83 01/27/2025 12:24 AM    GLUCOSE 102 01/27/2025 12:24 AM    GLUCOSE 105 01/22/2025 01:16 PM    CALCIUM 8.3 01/27/2025 12:24 AM    LABGLOM 85 01/27/2025 12:24 AM            IMPRESSION:   Recovering well post-op  Please apply a clean and dry dressing daily to the incision site  Discussed with primary team  All relevant risk and benefits explained.     
[facility] - Progress Note    Admit Date: 1/22/2025  Attending Cardiologist: Dr. Fisher     Assessment:     Patient Active Problem List    Diagnosis Date Noted    Orthopedic hardware present 01/25/2025    PAD (peripheral artery disease) (HCC) 01/23/2025    Encounter for assessment of healthcare decision-making capacity 01/23/2025    Critical limb ischemia of right lower extremity (HCC) 01/23/2025    Delirium 01/03/2025    Gangrene (HCC) 12/27/2024    Peripheral vascular disease, unspecified (HCC) 12/27/2024    Gangrene of foot (HCC) 12/27/2024    Osteomyelitis of left lower extremity 12/24/2024    Peripheral arterial disease (HCC) 12/24/2024    History of atrial fibrillation 12/24/2024    BPH (benign prostatic hyperplasia) 12/24/2024    Chronic CHF (congestive heart failure) ([facility]) 12/24/2024    Hypothyroidism 12/24/2024    Encounter for palliative care 12/24/2024    Goals of care, counseling/discussion 12/24/2024    Severe protein-calorie malnutrition (HCC) 12/24/2024    Dry gangrene ([facility]) 12/23/2024    Cellulitis 10/06/2018    Gastrointestinal hemorrhage 05/28/2018    Essential hypertension 06/12/2015    Primary osteoarthritis of both knees 06/12/2015    Leg weakness, bilateral 06/12/2015    Depression 12/20/2014    Hyperlipidemia 12/20/2014    Elevated PSA measurement 12/20/2014    Pre-diabetes 12/20/2014     - Severe PAD with left foot gangrene with osteomyelitis to the first through fifth phalanges s/p removal intramedullary mia, left femur and left AKA 1/25/25: vascular and orthopedic surgery following   - Nuclear stress test done at [facility] 10/2024 revealed fixed apical and inferior defects. No evidence of ischemia. 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   - Echo 12/24/24 EF of 50-55%, hypokinesis in basal inferolateral and mid inferolateral segments.   - Valvular disease. Mild AI, Mild to moderate MR on echo this 
[facility]  Progress Note    Patient: Stefano Begum Jr. Age: 84 y.o. : 1940 MR#: [id number] SSN: [id number]  Date/Time: 2025     No chief complaint on file.    Subjective:   Patient seen and examined. No acute events overnight. In good spirits, no new complaints. Denies chest pain, abdominal pain, shortness of breath.  Wanting to proceed with surgery.  Discussed care plan.  Questions were answered to the best of my ability.  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  Plan for left trochanteric femoral nail removal and left AKA .  N.p.o. at midnight with IV fluid, regular diet for today  -Will initiate preoperative antibiotics per ID.  Follow cultures.  -DVT prophylaxis please hold in the morning of 2025  Popliteal artery occlusion with aneurysmal dilation  Severe PAD   Hypertension  -Home med  Hyperlipidemia  -Home med  History of right foot all toes amputated   History of A fib - not on OAC d/t recurrent falls , rate controlled on atenolol, continue telemetry  Hypothyroidism  -Synthroid  -Home med  BPH  -Flomax  Depression   -Zoloft  Concern for blind wife at home- primary caregiver for wife   Severe malnutrition-will consult RD  Malnutrition Status:  Severe malnutrition (25 1716)    Context:  Chronic Illness     Findings of the 6 clinical characteristics of malnutrition:  Energy Intake:  Unable to assess  Weight Loss:  Greater than 20% over 1 year (-20.6% x 9 months)     Body Fat Loss:  Severe body fat loss Triceps, Fat Overlying Ribs   Muscle Mass Loss:  Severe muscle mass loss Thigh (quadriceps), Temples (temporalis)  Fluid Accumulation:  No fluid accumulation     Strength:  Not Performed     Dispo plan: Home once medically stable anticipated discharge date TBD pending surgical evaluation.     I spent 50 minutes with the patient in face-to-face 
[facility]  Progress Note    Patient: Stefano Begum Jr. Age: 84 y.o. : 1940 MR#: [id number] SSN: [id number]  Date/Time: 2025     No chief complaint on file.    Subjective:   Patient seen and examined. Feeling well. No additional complaints.   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 DVT ppx per surgery.   -Pain control, bowel regimen.  IV fluids.   -Zosyn, follow cultures.   Popliteal artery occlusion with aneurysmal dilation  Severe PAD-ASA/statin.  Atenolol on hold secondary to soft blood pressure.  Resume as able  Hypernatremia  Hyperchloremia  -Mild, continue D5W.  Monitor BMP.  Consider nephrology consult.   Hypertension now with hypotension  -IV fluids, check orthostats.   -Home med on hold as above.  Hyperlipidemia  -Home med  History of right foot all toes amputated   History of A fib - not on OAC d/t recurrent falls, continue telemetry.  Atenolol held secondary to hypotension.  Resume as able.  Hypothyroidism  -Synthroid  BPH  -Flomax on hold 2/2 hypotension  Depression   -Zoloft  Concern for blind wife at home- primary caregiver for wife   Severe malnutrition-will consult RD  Malnutrition Status:  Severe malnutrition (25 1716)    Context:  Chronic Illness     Findings of the 6 clinical characteristics of malnutrition:  Energy Intake:  Unable to assess  Weight Loss:  Greater than 20% over 1 year (-20.6% x 9 months)     Body Fat Loss:  Severe body fat loss Triceps, Fat Overlying Ribs   Muscle Mass Loss:  Severe muscle mass loss Thigh (quadriceps), Temples (temporalis)  Fluid Accumulation:  No fluid accumulation     Strength:  Not Performed     Dispo plan: Improving clinically, await PT/OT evaluation.    I spent 50 minutes with the patient in face-to-face consultation, of which greater than 50% was spent in counseling and 
[facility]  Progress Note    Patient: Stefano Begum Jr. Age: 84 y.o. : 1940 MR#: [id number] SSN: [id number]  Date/Time: 2025     No chief complaint on file.    Subjective:   Patient seen and examined. Feeling well. No additional complaints.   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   -Pain control, bowel regimen.  IV fluids.   Zosyn discontinued  Popliteal artery occlusion with aneurysmal dilation  Severe PAD-ASA/statin.  Atenolol on hold secondary to soft blood pressure.  Resume as able  Hypernatremia-resolved  Hyperchloremia-resolved  -Mild, continue D5W.  Monitor BMP.  Consider nephrology consult.   Hypertension now with hypotension  -IV fluids, check orthostats.   -Home med on hold as above.  Thrombocytopenia-hold DVT prophylaxis, place SCDs  Hyperlipidemia  -Home med  History of right foot all toes amputated   History of A fib - not on OAC d/t recurrent falls, continue telemetry.  Atenolol held secondary to hypotension.  Resume as able.  Hypothyroidism  -Synthroid  BPH  -Flomax on hold 2/2 hypotension  Depression   -Zoloft  Concern for blind wife at home- primary caregiver for wife   Severe malnutrition-will consult RD  Malnutrition Status:  Severe malnutrition (25 1716)    Context:  Chronic Illness     Findings of the 6 clinical characteristics of malnutrition:  Energy Intake:  Unable to assess  Weight Loss:  Greater than 20% over 1 year (-20.6% x 9 months)     Body Fat Loss:  Severe body fat loss Triceps, Fat Overlying Ribs   Muscle Mass Loss:  Severe muscle mass loss Thigh (quadriceps), Temples (temporalis)  Fluid Accumulation:  No fluid accumulation     Strength:  Not Performed     Dispo plan: Improving clinically, PT OT recommending SNF.  Placement pending    I spent 50 minutes with the patient in face-to-face consultation, 
[facility]  Progress Note    Patient: Stefano Begum Jr. Age: 84 y.o. : 1940 MR#: [id number] SSN: [id number]  Date/Time: 2025     No chief complaint on file.    Subjective:   Patient seen and examined. Feeling well. No additional complaints.  Discussed removal of Collins, awaiting placement.  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 DVT ppx per surgery.   -Pain control, bowel regimen.  Popliteal artery occlusion with aneurysmal dilation  Severe PAD-ASA/statin.  Atenolol on hold secondary to soft blood pressure.  Resume as able  Hypernatremia  Hyperchloremia  -Resolved.  Hypertension now with hypotension  - check orthostats.   -Home med on hold as above.  Hyperlipidemia  -Home med  History of right foot all toes amputated   History of A fib - not on OAC d/t recurrent falls, continue telemetry.  Atenolol held secondary to hypotension.  Resume as able.  Hypothyroidism  -Synthroid  BPH  -Flomax on hold 2/2 hypotension  Thrombocytopenia  -Suspect secondary to medication.  Follow-up workup.  Depression   -Zoloft  Concern for blind wife at home- primary caregiver for wife   Severe malnutrition-will consult RD  Malnutrition Status:  Severe malnutrition (25 1716)    Context:  Chronic Illness     Findings of the 6 clinical characteristics of malnutrition:  Energy Intake:  Unable to assess  Weight Loss:  Greater than 20% over 1 year (-20.6% x 9 months)     Body Fat Loss:  Severe body fat loss Triceps, Fat Overlying Ribs   Muscle Mass Loss:  Severe muscle mass loss Thigh (quadriceps), Temples (temporalis)  Fluid Accumulation:  No fluid accumulation     Strength:  Not Performed       Dispo plan: SNF, awaiting Auth.    I spent 40 minutes with the patient in face-to-face consultation, of which greater than 50% was spent in counseling and coordination 
[facility]      Talked at length with patient regarding planned left LE IM mia removal and AKA.  Patient in AAO x3 and demonstrates understanding.  All relevant risk and benefits, including but not limited to MI and death were explained.  He agrees to proceed.  Will plan accordingly.         PE:   Blood pressure 105/70, pulse 70, temperature 98.7 °F (37.1 °C), temperature source Oral, resp. rate 17, height 1.778 m (5' 10\"), weight 46.6 kg (102 lb 11.8 oz), SpO2 99%.    No intake or output data in the 24 hours ending 01/24/25 1214    NAD  Breathing comfortably  LLE toes gangrenous. No ascending erythema.       Lab Results   Component Value Date    WBC 10.3 01/22/2025    HGB 13.7 01/22/2025    HCT 45.6 01/22/2025    MCV 89.9 01/22/2025     (L) 01/22/2025     Lab Results   Component Value Date/Time     01/23/2025 04:04 AM    K 3.7 01/23/2025 04:04 AM     01/23/2025 04:04 AM    CO2 29 01/23/2025 04:04 AM    BUN 15 01/23/2025 04:04 AM    CREATININE 0.68 01/23/2025 04:04 AM    GLUCOSE 86 01/23/2025 04:04 AM    GLUCOSE 105 01/22/2025 01:16 PM    CALCIUM 8.5 01/23/2025 04:04 AM    LABGLOM >90 01/23/2025 04:04 AM        
[facility]    POD #1 s/p left AKA after  intramedullary nail removal  Seen resting comfortably in bed without distress  Reports adequate pain control    PE:   Blood pressure (!) 90/57, pulse 89, temperature 97.6 °F (36.4 °C), temperature source Axillary, resp. rate 16, height 1.778 m (5' 10\"), weight 46.6 kg (102 lb 11.8 oz), SpO2 100%.      Intake/Output Summary (Last 24 hours) at 1/26/2025 1429  Last data filed at 1/26/2025 1211  Gross per 24 hour   Intake 1240 ml   Output 250 ml   Net 990 ml       NAD  Breathing comfortably  Left AKA dressing is c/d/i      Lab Results   Component Value Date    WBC 7.8 01/26/2025    HGB 9.6 (L) 01/26/2025    HCT 32.1 (L) 01/26/2025    MCV 91.5 01/26/2025    PLT 82 (L) 01/26/2025     Lab Results   Component Value Date/Time     01/26/2025 02:47 AM    K 3.9 01/26/2025 02:47 AM     01/26/2025 02:47 AM    CO2 29 01/26/2025 02:47 AM    BUN 13 01/26/2025 02:47 AM    CREATININE 0.85 01/26/2025 02:47 AM    GLUCOSE 108 01/26/2025 02:47 AM    GLUCOSE 105 01/22/2025 01:16 PM    CALCIUM 8.0 01/26/2025 02:47 AM    LABGLOM 85 01/26/2025 02:47 AM            IMPRESSION/PLAN:   Recovering well post-op  Will remove dressing on post-op day #4  Discussed with primary team  
Comprehensive Nutrition Assessment    Type and Reason for Visit:  Initial, Positive nutrition screen    Nutrition Recommendations/Plan:   Continue NPO, advance diet as medically feasible.   Plan to order MVI and thiamine supplementation 2/2 malnutrition.   Daily wts.   Continue to monitor readiness for diet advancement or nutrition support, weight, labs, and plan of care during admission.     Malnutrition Assessment:  Malnutrition Status:  Severe malnutrition (01/23/25 1716)    Context:  Chronic Illness     Findings of the 6 clinical characteristics of malnutrition:  Energy Intake:  Unable to assess  Weight Loss:  Greater than 20% over 1 year (-20.6% x 9 months)     Body Fat Loss:  Severe body fat loss Triceps, Fat Overlying Ribs   Muscle Mass Loss:  Severe muscle mass loss Thigh (quadriceps), Temples (temporalis)  Fluid Accumulation:  No fluid accumulation     Strength:  Not Performed    Nutrition History and Allergies:      Past Medical History:   Diagnosis Date    BPH (benign prostatic hyperplasia) 12/24/2024    Chronic CHF (congestive heart failure) (HCC) 12/24/2024    Depression     History of atrial fibrillation 12/24/2024    Hyperlipidemia     Hypertension     Hypothyroidism 12/24/2024    Peripheral arterial disease (HCC) 12/24/2024    Peripheral arterial disease (HCC)    PMHx: dry gangrene of left foot, s/p TMA amputation of right foot, dementia.     Pt presented for evaluation of left lower extremity.     Weight Hx: per EMR review and current wt without source, wt change: -30.5 lb (22.8%) x 30 days, -74.1 lb (41.9%) x 9 months - significant, question accuracy.   Wt Readings from Last 10 Encounters:   01/22/25 46.7 kg (102 lb 15.3 oz)   01/22/25 46.7 kg (103 lb)   01/05/25 64.6 kg (142 lb 6.4 oz)   04/19/24 80.3 kg (177 lb)     NFPE: Visual NFPE conducted only. Food Allergies: NKFA    Nutrition Assessment:    Admitted for management of dry gangrene, PAD. Pt seen for - BMI (14.8).visited pt- no verbal 
Comprehensive Nutrition Assessment    Type and Reason for Visit:  Reassess    Nutrition Recommendations/Plan:   Continue current diet as tolerated.  Continue oral supplements: Ensure Plus High Protein (each provides 350 kcal, 20g protein) modify frequency/timing to TID with meals  Continue MVI and thiamine supplementation.  Daily wts.   Continue to monitor tolerance of PO, compliance of oral supplements, weight, labs, and plan of care during admission.     Malnutrition Assessment:  Malnutrition Status:  Severe malnutrition (01/23/25 1716)    Context:  Chronic Illness     Findings of the 6 clinical characteristics of malnutrition:  Energy Intake:  Unable to assess  Weight Loss:  Greater than 20% over 1 year (-20.6% x 9 months)     Body Fat Loss:  Severe body fat loss Triceps, Fat Overlying Ribs   Muscle Mass Loss:  Severe muscle mass loss Thigh (quadriceps), Temples (temporalis)  Fluid Accumulation:  No fluid accumulation     Strength:  Not Performed    Nutrition Assessment:    Admitted for management of dry gangrene, PAD. Per chart, psychiatry determined pt did have capacity to make decisions regarding their own care 1/23. S/p left trochanteric femoral nail hardware removal 1/25; left AKA 1/26. Per flow sheets, meal intake 1-100%, with 6/7 entries <50%. Two entries of supplement intake, >75%. Attempted to visit pt- pt requesting help from nursing staff to clean self s/p BM. Writer left to find RN; RN still in room at next attempt to visit. Will modify supplement orders to TID with meals. Continue to monitor PO intake/tolerance of meals/supplements.    Nutrition Related Findings:    Pertinent Meds:   MVI  Thiamine  Synthroid   Pertinent Labs:  Recent Labs     01/28/25  0451 01/29/25  0140   GLUCOSE 96 88   BUN 14 21*   CREATININE 0.65 0.58*    135*   K 3.7 3.4*    102   CO2 30 29   CALCIUM 8.0* 7.9*     No results for input(s): \"POCGLU\" in the last 72 hours.    Last BM: 01/23/25    Skin: Wound Type: 
I called the phone number listed in the computer that correspondence to his wife: Miss Copeland . The message went to voicemail.  I was informing her that her  surgery went well from orthopedic surgical standpoint     Dr. Villarreal 
Infectious Disease Consultation Note        Reason: left foot infection     Current abx Prior abx    Ciprofloxacin, amoxicillin/clavulanate     Lines:       Assessment :  84-year-old male past medical history of severe peripheral arterial disease, dry gangrene of the left foot, status post TMA amputation of the right foot sent to [facility] on 1/22/25 from [facility] for evaluation of left lower extremity.    Clinical presentation c/w chronic osteomyelitis left first through fifth phalanges, left fifth MTP joint in setting of several peripheral arterial disease, left foot dry gangrene     Wound cx 12/24/24-mixed gnr  Blood culture 12/23/24- no growth     + foul smell left foot suggestive of superimposed infection. Infection seems to have remain localized to the left foot due to severe peripheral arterial disease      Patient wishes to have higher level of amputation LLE  Psych evaluation appreciated.  Patient deemed to have decision-making capacity.    Plans for left AKA per vascular surgery noted  Plans for removal of left intramedullary mia per Ortho surgery     Thrombocytopenia- ?  Medication induced     Recommendations:     Start piperacillin/tazobactam in anticipation of left lower extremity surgery  follow-up Ortho surgery, vascular surgery recommendations regarding left AKA, intramedullary mia removal  Will discontinue antibiotics after surgery based on intraoperative findings     Above plan was discussed in details with dr. Chen. Please call me if any further questions or concerns. Will continue to participate in the care of this patient.        HPI:        Patient denies any fever, chills. Unable to obtain detailed review of systems since patient is unable to answer all questions asked        Past Medical History:   Diagnosis Date    BPH (benign prostatic hyperplasia) 12/24/2024    Chronic CHF (congestive heart failure) (HCC) 12/24/2024    Depression     History of atrial fibrillation 12/24/2024    
Notified attending  provider regarding patient not voiding during shift and bladder scan amount of  638ml ; receive one time order to straight cath patient;   
Patient left unit for procedure. Unable to obtain beginning of shift vitals  
Patient seen and examined.   Recent H&P were reviewed.   All relevant risks (including but not limited to death and MI) and benefits explained.  Patient demonstrates understanding and agrees to proceed with the planned procedure.      
Pt picked up by transport, NAD noted. All belongings sent with patient. IV removed, pt tolerated well.   
Pt voided unmeasurable amount  
Removed vinson cath per order. Pt tolerated well. Voiding trial started.  
Report given Sukhdeep RILEY at [facility]; All questions answer; Patient  time 3489    
Respiratory
Nightly    atenolol (TENORMIN) tablet 50 mg  50 mg Oral Daily    oxyCODONE (ROXICODONE) immediate release tablet 5 mg  5 mg Oral Q4H PRN       Labs:    Recent Results (from the past 24 hour(s))   Comprehensive Metabolic Panel    Collection Time: 01/23/25  4:04 AM   Result Value Ref Range    Sodium 142 136 - 145 mmol/L    Potassium 3.7 3.5 - 5.5 mmol/L    Chloride 110 100 - 111 mmol/L    CO2 29 21 - 32 mmol/L    Anion Gap 3 3.0 - 18 mmol/L    Glucose 86 74 - 99 mg/dL    BUN 15 7.0 - 18 MG/DL    Creatinine 0.68 0.6 - 1.3 MG/DL    BUN/Creatinine Ratio 22 (H) 12 - 20      Est, Glom Filt Rate >90 >60 ml/min/1.73m2    Calcium 8.5 8.5 - 10.1 MG/DL    Total Bilirubin 1.0 0.2 - 1.0 MG/DL    ALT 40 16 - 61 U/L    AST 19 10 - 38 U/L    Alk Phosphatase 84 45 - 117 U/L    Total Protein 5.7 (L) 6.4 - 8.2 g/dL    Albumin 2.5 (L) 3.4 - 5.0 g/dL    Globulin 3.2 2.0 - 4.0 g/dL    Albumin/Globulin Ratio 0.8 0.8 - 1.7       Signed By: Enrique Chen DO     January 23, 2025      Disclaimer: Sections of this note are dictated using utilizing voice recognition software.  Minor typographical errors may be present. If questions arise, please do not hesitate to contact me or call our department.      
Nutrition Focused Physical Findings, Weight, Skin    Discharge Planning:    Continue current diet, Continue Oral Nutrition Supplement     Elin Sullivan, BRIANA  Contact: [phone]  Available via [employer]  
true     Ran Out of Food in the Last Year: Never true   Transportation Needs: No Transportation Needs (1/22/2025)    PRAPARE - Transportation     Lack of Transportation (Medical): No     Lack of Transportation (Non-Medical): No   Recent Concern: Transportation Needs - Unmet Transportation Needs (11/9/2024)    Received from [facility]    OASIS : Transportation     Lack of Transportation (Medical): Yes     Lack of Transportation (Non-Medical): Yes     Patient Unable or Declines to Respond: No   Physical Activity: Not on file   Stress: Not on file   Social Connections: Feeling Socially Integrated (11/9/2024)    Received from [facility]    OASIS : Social Isolation     Frequency of experiencing loneliness or isolation: Never   Intimate Partner Violence: Not At Risk (12/9/2024)    Received from [facility]    Humiliation, Afraid, Rape, and Kick questionnaire     Fear of Current or Ex-Partner: No     Emotionally Abused: No     Physically Abused: No     Sexually Abused: No   Housing Stability: Low Risk  (1/22/2025)    Housing Stability Vital Sign     Unable to Pay for Housing in the Last Year: No     Number of Times Moved in the Last Year: 1     Homeless in the Last Year: No      No family history on file.  Current Facility-Administered Medications   Medication Dose Route Frequency    [Held by provider] heparin (porcine) injection 5,000 Units  5,000 Units SubCUTAneous 3 times per day    piperacillin-tazobactam (ZOSYN) 3,375 mg in sodium chloride 0.9 % 50 mL IVPB (mini-bag)  3,375 mg IntraVENous Q8H    0.9 % sodium chloride infusion   IntraVENous Continuous    sodium chloride flush 0.9 % injection 5-40 mL  5-40 mL IntraVENous 2 times per day    sodium chloride flush 0.9 % injection 5-40 mL  5-40 mL IntraVENous PRN    0.9 % sodium chloride infusion   IntraVENous PRN    potassium chloride (KLOR-CON M) extended release tablet 40 mEq  40 mEq Oral PRN    Or    potassium bicarb-citric acid (EFFER-K) 
Emotionally Abused: No     Physically Abused: No     Sexually Abused: No   Housing Stability: Low Risk  (2025)    Housing Stability Vital Sign     Unable to Pay for Housing in the Last Year: No     Number of Times Moved in the Last Year: 1     Homeless in the Last Year: No     Social History     Tobacco Use   Smoking Status Never   Smokeless Tobacco Never        Temp (24hrs), Av.3 °F (36.8 °C), Min:97.3 °F (36.3 °C), Max:99.4 °F (37.4 °C)    BP (!) 100/58   Pulse 80   Temp 98.8 °F (37.1 °C) (Oral)   Resp 16   Ht 1.778 m (5' 10\")   Wt 46.6 kg (102 lb 11.8 oz)   SpO2 94%   BMI 14.74 kg/m²     ROS: Unable to obtain due to patient factors    Physical Exam:    General: NAD, appears stated age, alert, oriented x 3     Eyes: sclera is non-icteric  HENT: normocephalic/atraumatic, moist mucus membranes  Respiratory: CTA with no signs of respiratory distress  Cardiovascular: RRR, no peripheral edema of extremities  GI: soft, non-tender, normal bowel sounds  Neuro: moves all extremities, no focal deficits, normal speech  Psych: appropriate mood and affect, no visual or auditory hallucinations  Skin: dry gangrene left foot, foul smell        Labs: Results:   Chemistry Recent Labs     25  0247 25  0024   GLUCOSE 88 108* 102*    146* 141   K 3.6 3.9 4.5    113* 110   CO2 28 29 29   BUN 16 13 16   CREATININE 0.68 0.85 0.83   GLOB  --   --  2.7   ALT  --   --  15*   AST  --   --  13      CBC w/Diff Recent Labs     256 25  0247 25  0024   WBC 9.0 7.8 8.2   RBC 4.29* 3.51* 3.21*   HGB 11.7* 9.6* 8.7*   HCT 38.1 32.1* 29.5*   * 82* 82*      Microbiology Results       Procedure Component Value Units Date/Time    Culture, Blood 1 [[id number]] Collected: 25 1004    Order Status: Completed Specimen: Blood Updated: 25 0650     Special Requests NO SPECIAL REQUESTS        Culture NO GROWTH 4 DAYS       Culture, Blood 2 [[id number]] Collected: 
Granulocytes % 1.2 (H) 0.0 - 0.5 %    Neutrophils Absolute 7.08 1.80 - 8.00 K/UL    Lymphocytes Absolute 1.19 0.90 - 3.60 K/UL    Monocytes Absolute 0.60 0.05 - 1.20 K/UL    Eosinophils Absolute 0.03 0.00 - 0.40 K/UL    Basophils Absolute 0.03 0.00 - 0.10 K/UL    Immature Granulocytes Absolute 0.11 (H) 0.00 - 0.04 K/UL    Differential Type AUTOMATED       Signed By: Enrique Chen DO     January 25, 2025      Disclaimer: Sections of this note are dictated using utilizing voice recognition software.  Minor typographical errors may be present. If questions arise, please do not hesitate to contact me or call our department.

## 2025-02-18 ENCOUNTER — TELEPHONE (OUTPATIENT)
Age: 85
End: 2025-02-18

## 2025-02-18 NOTE — TELEPHONE ENCOUNTER
Cassandra wound care nurse from [facility] calling regarding pt Stefano Begum he has staples in him from 01/25/25 and she wants to know if she can remove the staples skin is getting irritated . She can be reached at [phone]

## 2025-02-20 ENCOUNTER — APPOINTMENT (OUTPATIENT)
Facility: HOSPITAL | Age: 85
End: 2025-02-20
Payer: MEDICARE

## 2025-02-20 ENCOUNTER — HOSPITAL ENCOUNTER (EMERGENCY)
Facility: HOSPITAL | Age: 85
Discharge: HOME | End: 2025-02-20
Attending: STUDENT IN AN ORGANIZED HEALTH CARE EDUCATION/TRAINING PROGRAM
Payer: MEDICARE

## 2025-02-20 VITALS
DIASTOLIC BLOOD PRESSURE: 77 MMHG | OXYGEN SATURATION: 100 % | SYSTOLIC BLOOD PRESSURE: 109 MMHG | TEMPERATURE: 98.5 F | WEIGHT: 120 LBS | HEIGHT: 70 IN | BODY MASS INDEX: 17.18 KG/M2 | HEART RATE: 80 BPM | RESPIRATION RATE: 21 BRPM

## 2025-02-20 DIAGNOSIS — I70.90 ARTERIAL OCCLUSION: Primary | ICD-10-CM

## 2025-02-20 DIAGNOSIS — I73.9 PAD (PERIPHERAL ARTERY DISEASE): ICD-10-CM

## 2025-02-20 LAB
ANION GAP SERPL CALC-SCNC: 5 MMOL/L (ref 3–18)
BASOPHILS # BLD: 0.01 K/UL (ref 0–0.1)
BASOPHILS NFR BLD: 0.1 % (ref 0–2)
BUN SERPL-MCNC: 17 MG/DL (ref 7–18)
BUN/CREAT SERPL: 22 (ref 12–20)
CALCIUM SERPL-MCNC: 8.5 MG/DL (ref 8.5–10.1)
CHLORIDE SERPL-SCNC: 107 MMOL/L (ref 100–111)
CO2 SERPL-SCNC: 28 MMOL/L (ref 21–32)
CREAT SERPL-MCNC: 0.76 MG/DL (ref 0.6–1.3)
DIFFERENTIAL METHOD BLD: ABNORMAL
EOSINOPHIL # BLD: 0.01 K/UL (ref 0–0.4)
EOSINOPHIL NFR BLD: 0.1 % (ref 0–5)
ERYTHROCYTE [DISTWIDTH] IN BLOOD BY AUTOMATED COUNT: 19.6 % (ref 11.6–14.5)
GLUCOSE SERPL-MCNC: 106 MG/DL (ref 74–99)
HCT VFR BLD AUTO: 38.4 % (ref 36–48)
HGB BLD-MCNC: 11.7 G/DL (ref 13–16)
IMM GRANULOCYTES # BLD AUTO: 0.03 K/UL (ref 0–0.04)
IMM GRANULOCYTES NFR BLD AUTO: 0.4 % (ref 0–0.5)
INR PPP: 1.4 (ref 0.9–1.1)
LYMPHOCYTES # BLD: 0.9 K/UL (ref 0.9–3.6)
LYMPHOCYTES NFR BLD: 10.6 % (ref 21–52)
MCH RBC QN AUTO: 28.2 PG (ref 24–34)
MCHC RBC AUTO-ENTMCNC: 30.5 G/DL (ref 31–37)
MCV RBC AUTO: 92.5 FL (ref 78–100)
MONOCYTES # BLD: 0.8 K/UL (ref 0.05–1.2)
MONOCYTES NFR BLD: 9.4 % (ref 3–10)
NEUTS SEG # BLD: 6.75 K/UL (ref 1.8–8)
NEUTS SEG NFR BLD: 79.4 % (ref 40–73)
NRBC # BLD: 0 K/UL (ref 0–0.01)
NRBC BLD-RTO: 0 PER 100 WBC
PLATELET # BLD AUTO: 97 K/UL (ref 135–420)
PMV BLD AUTO: 11.4 FL (ref 9.2–11.8)
POTASSIUM SERPL-SCNC: 4.1 MMOL/L (ref 3.5–5.5)
PROTHROMBIN TIME: 17.8 SEC (ref 11.9–14.9)
RBC # BLD AUTO: 4.15 M/UL (ref 4.35–5.65)
SODIUM SERPL-SCNC: 140 MMOL/L (ref 136–145)
WBC # BLD AUTO: 8.5 K/UL (ref 4.6–13.2)

## 2025-02-20 PROCEDURE — 85025 COMPLETE CBC W/AUTO DIFF WBC: CPT

## 2025-02-20 PROCEDURE — 93005 ELECTROCARDIOGRAM TRACING: CPT | Performed by: STUDENT IN AN ORGANIZED HEALTH CARE EDUCATION/TRAINING PROGRAM

## 2025-02-20 PROCEDURE — 6360000004 HC RX CONTRAST MEDICATION: Performed by: STUDENT IN AN ORGANIZED HEALTH CARE EDUCATION/TRAINING PROGRAM

## 2025-02-20 PROCEDURE — 85610 PROTHROMBIN TIME: CPT

## 2025-02-20 PROCEDURE — 75635 CT ANGIO ABDOMINAL ARTERIES: CPT

## 2025-02-20 PROCEDURE — 94761 N-INVAS EAR/PLS OXIMETRY MLT: CPT

## 2025-02-20 PROCEDURE — 80048 BASIC METABOLIC PNL TOTAL CA: CPT

## 2025-02-20 PROCEDURE — 99285 EMERGENCY DEPT VISIT HI MDM: CPT

## 2025-02-20 RX ORDER — IOPAMIDOL 755 MG/ML
100 INJECTION, SOLUTION INTRAVASCULAR
Status: COMPLETED | OUTPATIENT
Start: 2025-02-20 | End: 2025-02-20

## 2025-02-20 RX ADMIN — IOPAMIDOL 100 ML: 755 INJECTION, SOLUTION INTRAVENOUS at 17:40

## 2025-02-20 ASSESSMENT — PAIN - FUNCTIONAL ASSESSMENT: PAIN_FUNCTIONAL_ASSESSMENT: NONE - DENIES PAIN

## 2025-02-20 NOTE — ED PROVIDER NOTES
EMERGENCY DEPARTMENT HISTORY AND PHYSICAL EXAM      Date: 2/20/2025  Patient Name: Stefano Begum Jr.    History of Presenting Illness     Chief Complaint   Patient presents with    Circulatory Problem       History (Context): Stefano Begum Jr. is a 84 y.o. male  presents to the ED today with chief complaint of right foot discoloration.  Per EMS patient picked up from a nursing facility after his foot appeared to be further discolored from normal.  States that they were unable to obtain a pulse while at the facility.  Upon arrival patient denies any complaints at this time.  He does state that the skin to his foot looks \"slightly different.\"  Denies any pain, paresthesias, or further complaints.      PCP: Mignon Foster PA    No current facility-administered medications for this encounter.     Current Outpatient Medications   Medication Sig Dispense Refill    lactobacillus (CULTURELLE) capsule Take 1 capsule by mouth daily (with breakfast) 60 capsule 0    Multiple Vitamin (MULTIVITAMIN) TABS tablet Take 1 tablet by mouth daily 30 tablet 0    thiamine mononitrate (THIAMINE) 100 MG tablet Take 1 tablet by mouth daily 30 tablet 0    atenolol (TENORMIN) 50 MG tablet Take 1 tablet by mouth daily      pantoprazole (PROTONIX) 40 MG tablet Take 1 tablet by mouth as needed (daily prn)      rosuvastatin (CRESTOR) 40 MG tablet Take 1 tablet by mouth every evening      traZODone (DESYREL) 50 MG tablet Take 1 tablet by mouth nightly      sertraline (ZOLOFT) 50 MG tablet Take 1 tablet by mouth daily      levothyroxine (SYNTHROID) 50 MCG tablet Take 1 tablet by mouth Daily         Past History     Past Medical History:   Past Medical History:   Diagnosis Date    BPH (benign prostatic hyperplasia) 12/24/2024    Chronic CHF (congestive heart failure) (HCC) 12/24/2024    Depression     History of atrial fibrillation 12/24/2024    Hyperlipidemia     Hypertension     Hypothyroidism 12/24/2024    Peripheral arterial disease

## 2025-02-20 NOTE — ED TRIAGE NOTES
Pt arrives to ED from nursing home with cc no pulse in right foot. Pt has left AKA. Pt has hx vascular disease. Patient from [facility]

## 2025-02-21 ENCOUNTER — HOSPITAL ENCOUNTER (EMERGENCY)
Facility: HOSPITAL | Age: 85
Discharge: HOME | End: 2025-02-22
Attending: STUDENT IN AN ORGANIZED HEALTH CARE EDUCATION/TRAINING PROGRAM
Payer: MEDICARE

## 2025-02-21 ENCOUNTER — APPOINTMENT (OUTPATIENT)
Facility: HOSPITAL | Age: 85
End: 2025-02-21
Payer: MEDICARE

## 2025-02-21 DIAGNOSIS — I73.9 PAD (PERIPHERAL ARTERY DISEASE): Primary | ICD-10-CM

## 2025-02-21 DIAGNOSIS — I70.209 ARTERIAL OCCLUSION, LOWER EXTREMITY: ICD-10-CM

## 2025-02-21 LAB
ALBUMIN SERPL-MCNC: 2.8 G/DL (ref 3.4–5)
ALBUMIN/GLOB SERPL: 0.7 (ref 0.8–1.7)
ALP SERPL-CCNC: 103 U/L (ref 45–117)
ALT SERPL-CCNC: 25 U/L (ref 16–61)
ANION GAP SERPL CALC-SCNC: 6 MMOL/L (ref 3–18)
APPEARANCE UR: CLEAR
AST SERPL-CCNC: 34 U/L (ref 10–38)
BACTERIA URNS QL MICRO: ABNORMAL /HPF
BASE EXCESS BLD CALC-SCNC: 2.2 MMOL/L
BASOPHILS # BLD: 0.01 K/UL (ref 0–0.1)
BASOPHILS NFR BLD: 0.1 % (ref 0–2)
BILIRUB DIRECT SERPL-MCNC: 0.3 MG/DL (ref 0–0.2)
BILIRUB SERPL-MCNC: 1.1 MG/DL (ref 0.2–1)
BILIRUB UR QL: ABNORMAL
BUN SERPL-MCNC: 17 MG/DL (ref 7–18)
BUN/CREAT SERPL: 20 (ref 12–20)
CALCIUM SERPL-MCNC: 9.1 MG/DL (ref 8.5–10.1)
CAOX CRY URNS QL MICRO: ABNORMAL
CHLORIDE SERPL-SCNC: 106 MMOL/L (ref 100–111)
CK SERPL-CCNC: 126 U/L (ref 39–308)
CO2 SERPL-SCNC: 27 MMOL/L (ref 21–32)
COLOR UR: ABNORMAL
CREAT SERPL-MCNC: 0.85 MG/DL (ref 0.6–1.3)
DIFFERENTIAL METHOD BLD: ABNORMAL
EKG DIAGNOSIS: NORMAL
EKG Q-T INTERVAL: 392 MS
EKG QRS DURATION: 92 MS
EKG QTC CALCULATION (BAZETT): 449 MS
EKG R AXIS: 30 DEGREES
EKG T AXIS: -66 DEGREES
EKG VENTRICULAR RATE: 79 BPM
EOSINOPHIL # BLD: 0 K/UL (ref 0–0.4)
EOSINOPHIL NFR BLD: 0 % (ref 0–5)
EPITH CASTS URNS QL MICRO: ABNORMAL /LPF (ref 0–5)
ERYTHROCYTE [DISTWIDTH] IN BLOOD BY AUTOMATED COUNT: 19.8 % (ref 11.6–14.5)
GLOBULIN SER CALC-MCNC: 3.8 G/DL (ref 2–4)
GLUCOSE SERPL-MCNC: 84 MG/DL (ref 74–99)
GLUCOSE UR STRIP.AUTO-MCNC: NEGATIVE MG/DL
HCO3 BLD-SCNC: 27.7 MMOL/L (ref 21–28)
HCT VFR BLD AUTO: 42.2 % (ref 36–48)
HGB BLD-MCNC: 13 G/DL (ref 13–16)
HGB UR QL STRIP: NEGATIVE
HYALINE CASTS URNS QL MICRO: ABNORMAL /LPF (ref 0–2)
IMM GRANULOCYTES # BLD AUTO: 0.04 K/UL (ref 0–0.04)
IMM GRANULOCYTES NFR BLD AUTO: 0.5 % (ref 0–0.5)
INR PPP: 1.3 (ref 0.9–1.1)
KETONES UR QL STRIP.AUTO: ABNORMAL MG/DL
LACTATE BLD-SCNC: 1.42 MMOL/L (ref 0.4–2)
LACTATE BLD-SCNC: 2.18 MMOL/L (ref 0.4–2)
LACTATE BLD-SCNC: 5.82 MMOL/L (ref 0.4–2)
LEUKOCYTE ESTERASE UR QL STRIP.AUTO: ABNORMAL
LIPASE SERPL-CCNC: 61 U/L (ref 13–75)
LYMPHOCYTES # BLD: 1.12 K/UL (ref 0.9–3.6)
LYMPHOCYTES NFR BLD: 13.2 % (ref 21–52)
MCH RBC QN AUTO: 28.3 PG (ref 24–34)
MCHC RBC AUTO-ENTMCNC: 30.8 G/DL (ref 31–37)
MCV RBC AUTO: 91.7 FL (ref 78–100)
MONOCYTES # BLD: 0.74 K/UL (ref 0.05–1.2)
MONOCYTES NFR BLD: 8.7 % (ref 3–10)
MUCOUS THREADS URNS QL MICRO: ABNORMAL /LPF
NEUTS SEG # BLD: 6.57 K/UL (ref 1.8–8)
NEUTS SEG NFR BLD: 77.5 % (ref 40–73)
NITRITE UR QL STRIP.AUTO: NEGATIVE
NRBC # BLD: 0 K/UL (ref 0–0.01)
NRBC BLD-RTO: 0 PER 100 WBC
PCO2 BLD: 45.2 MMHG (ref 35–48)
PH BLD: 7.4 (ref 7.35–7.45)
PH UR STRIP: 6 (ref 5–8)
PLATELET # BLD AUTO: 103 K/UL (ref 135–420)
PMV BLD AUTO: 10.9 FL (ref 9.2–11.8)
PO2 BLD: 33 MMHG (ref 83–108)
POTASSIUM SERPL-SCNC: 5 MMOL/L (ref 3.5–5.5)
PROT SERPL-MCNC: 6.6 G/DL (ref 6.4–8.2)
PROT UR STRIP-MCNC: ABNORMAL MG/DL
PROTHROMBIN TIME: 16.7 SEC (ref 11.9–14.9)
RBC # BLD AUTO: 4.6 M/UL (ref 4.35–5.65)
RBC #/AREA URNS HPF: ABNORMAL /HPF (ref 0–5)
SAO2 % BLD: 63.5 % (ref 92–97)
SERVICE CMNT-IMP: ABNORMAL
SERVICE CMNT-IMP: ABNORMAL
SODIUM SERPL-SCNC: 139 MMOL/L (ref 136–145)
SP GR UR REFRACTOMETRY: 1.02 (ref 1–1.03)
SPECIMEN TYPE: ABNORMAL
UROBILINOGEN UR QL STRIP.AUTO: 1 EU/DL (ref 0.2–1)
WBC # BLD AUTO: 8.5 K/UL (ref 4.6–13.2)
WBC URNS QL MICRO: ABNORMAL /HPF (ref 0–5)
YEAST URNS QL MICRO: ABNORMAL

## 2025-02-21 PROCEDURE — 83605 ASSAY OF LACTIC ACID: CPT

## 2025-02-21 PROCEDURE — 87040 BLOOD CULTURE FOR BACTERIA: CPT

## 2025-02-21 PROCEDURE — 85025 COMPLETE CBC W/AUTO DIFF WBC: CPT

## 2025-02-21 PROCEDURE — 94761 N-INVAS EAR/PLS OXIMETRY MLT: CPT

## 2025-02-21 PROCEDURE — 81001 URINALYSIS AUTO W/SCOPE: CPT

## 2025-02-21 PROCEDURE — 93005 ELECTROCARDIOGRAM TRACING: CPT | Performed by: STUDENT IN AN ORGANIZED HEALTH CARE EDUCATION/TRAINING PROGRAM

## 2025-02-21 PROCEDURE — 80048 BASIC METABOLIC PNL TOTAL CA: CPT

## 2025-02-21 PROCEDURE — 82550 ASSAY OF CK (CPK): CPT

## 2025-02-21 PROCEDURE — 93010 ELECTROCARDIOGRAM REPORT: CPT | Performed by: INTERNAL MEDICINE

## 2025-02-21 PROCEDURE — 80076 HEPATIC FUNCTION PANEL: CPT

## 2025-02-21 PROCEDURE — 87086 URINE CULTURE/COLONY COUNT: CPT

## 2025-02-21 PROCEDURE — 82803 BLOOD GASES ANY COMBINATION: CPT

## 2025-02-21 PROCEDURE — 99285 EMERGENCY DEPT VISIT HI MDM: CPT

## 2025-02-21 PROCEDURE — 2580000003 HC RX 258: Performed by: STUDENT IN AN ORGANIZED HEALTH CARE EDUCATION/TRAINING PROGRAM

## 2025-02-21 PROCEDURE — 73620 X-RAY EXAM OF FOOT: CPT

## 2025-02-21 PROCEDURE — 83690 ASSAY OF LIPASE: CPT

## 2025-02-21 PROCEDURE — 85610 PROTHROMBIN TIME: CPT

## 2025-02-21 RX ORDER — SODIUM CHLORIDE, SODIUM LACTATE, POTASSIUM CHLORIDE, AND CALCIUM CHLORIDE .6; .31; .03; .02 G/100ML; G/100ML; G/100ML; G/100ML
250 INJECTION, SOLUTION INTRAVENOUS ONCE
Status: COMPLETED | OUTPATIENT
Start: 2025-02-21 | End: 2025-02-21

## 2025-02-21 RX ADMIN — SODIUM CHLORIDE, SODIUM LACTATE, POTASSIUM CHLORIDE, AND CALCIUM CHLORIDE 250 ML: .6; .31; .03; .02 INJECTION, SOLUTION INTRAVENOUS at 15:15

## 2025-02-21 ASSESSMENT — PAIN - FUNCTIONAL ASSESSMENT
PAIN_FUNCTIONAL_ASSESSMENT: ACTIVITIES ARE NOT PREVENTED
PAIN_FUNCTIONAL_ASSESSMENT: 0-10

## 2025-02-21 ASSESSMENT — PAIN SCALES - GENERAL
PAINLEVEL_OUTOF10: 0
PAINLEVEL_OUTOF10: 0

## 2025-02-21 NOTE — ED TRIAGE NOTES
Pt arrived via EMS from [facility] for reported cool extremity.  Per EMS the staff states the patient leg is cool and has increased redness.  Pt was seen yesterday for the same and per staff the redness has increased and they were unable to obtain pulse with with doppler or palpation.

## 2025-02-21 NOTE — ED NOTES
1651- POC lactic elevated 5.82 sample noted to be hemolyzed.   1658- straight stuck for POC lactic 1.42    Estrada COLLIER made aware

## 2025-02-21 NOTE — ED PROVIDER NOTES
[facility] EMERGENCY DEPARTMENT  EMERGENCY DEPARTMENT ENCOUNTER      Pt Name: Stefano Begum Jr.  MRN: [id number]  Birthdate 1940  Date of evaluation: 2/21/2025  Provider: Manuel Dorado III, MD  9:27 PM    CHIEF COMPLAINT       Chief Complaint   Patient presents with    Cold Extremity         HISTORY OF PRESENT ILLNESS    Stefano Begum Jr. is a 84 y.o. male with a past medical history including hypertension, hyperlipidemia, CHF, atrial fibrillation, hypothyroidism, PAD status post left AKA and right toes amputation, presenting to the emergency department from nursing facility with concerns of discoloration and loss of pulses in right foot.  Patient was seen here yesterday for similar symptoms with thorough workup and consultation with vascular surgery, however facility provider feels that the patient's foot looks worse today.    Nursing Notes were reviewed.    REVIEW OF SYSTEMS       Review of Systems   Unable to perform ROS: Acuity of condition       PAST MEDICAL HISTORY     Past Medical History:   Diagnosis Date    BPH (benign prostatic hyperplasia) 12/24/2024    Chronic CHF (congestive heart failure) (HCC) 12/24/2024    Depression     History of atrial fibrillation 12/24/2024    Hyperlipidemia     Hypertension     Hypothyroidism 12/24/2024    Peripheral arterial disease 12/24/2024    Peripheral arterial disease          SURGICAL HISTORY       Past Surgical History:   Procedure Laterality Date    FOOT AMPUTATION THROUGH METATARSAL Left     Left foot all toes amputated    LEG SURGERY Left 1/25/2025    LEFT TROCHANTERIC FEMORAL NAIL HARDWARE REMOVAL; C-ARM; [DEPUY SYNTHES ORTHO] performed by Ronald Villarreal MD at [facility] MAIN OR    LEG SURGERY Left 1/25/2025    LEFT LEG ABOVE KNEE AMPUTATION performed by Larry Miller MD at [facility] MAIN OR         CURRENT MEDICATIONS       Previous Medications    ATENOLOL (TENORMIN) 50 MG TABLET    Take 1 tablet by mouth daily    LACTOBACILLUS (CULTURELLE) CAPSULE

## 2025-02-21 NOTE — ED NOTES
Rounded on patient. Patient resting on stretcher in a position of comfort, vss and NAD. Patient has no complaints at this time. Bed in lowest position, call bell within reach. Care ongoing.

## 2025-02-22 VITALS
TEMPERATURE: 97.9 F | DIASTOLIC BLOOD PRESSURE: 66 MMHG | BODY MASS INDEX: 16.32 KG/M2 | HEART RATE: 99 BPM | SYSTOLIC BLOOD PRESSURE: 97 MMHG | RESPIRATION RATE: 16 BRPM | OXYGEN SATURATION: 94 % | WEIGHT: 114 LBS | HEIGHT: 70 IN

## 2025-02-22 LAB
BACTERIA SPEC CULT: NORMAL
EKG DIAGNOSIS: NORMAL
EKG Q-T INTERVAL: 358 MS
EKG QRS DURATION: 88 MS
EKG QTC CALCULATION (BAZETT): 437 MS
EKG R AXIS: -11 DEGREES
EKG T AXIS: -85 DEGREES
EKG VENTRICULAR RATE: 90 BPM
SERVICE CMNT-IMP: NORMAL

## 2025-02-22 PROCEDURE — 93010 ELECTROCARDIOGRAM REPORT: CPT | Performed by: INTERNAL MEDICINE

## 2025-02-23 LAB
BACTERIA SPEC CULT: NORMAL
BACTERIA SPEC CULT: NORMAL
SERVICE CMNT-IMP: NORMAL
SERVICE CMNT-IMP: NORMAL

## 2025-02-26 ENCOUNTER — HOSPITAL ENCOUNTER (INPATIENT)
Facility: HOSPITAL | Age: 85
LOS: 5 days | End: 2025-03-03
Attending: EMERGENCY MEDICINE | Admitting: STUDENT IN AN ORGANIZED HEALTH CARE EDUCATION/TRAINING PROGRAM
Payer: MEDICARE

## 2025-02-26 ENCOUNTER — OFFICE VISIT (OUTPATIENT)
Age: 85
End: 2025-02-26
Payer: MEDICARE

## 2025-02-26 VITALS
OXYGEN SATURATION: 99 % | HEIGHT: 70 IN | WEIGHT: 114 LBS | BODY MASS INDEX: 16.32 KG/M2 | DIASTOLIC BLOOD PRESSURE: 76 MMHG | HEART RATE: 95 BPM | SYSTOLIC BLOOD PRESSURE: 100 MMHG

## 2025-02-26 DIAGNOSIS — I70.221 CRITICAL LIMB ISCHEMIA OF RIGHT LOWER EXTREMITY (HCC): ICD-10-CM

## 2025-02-26 DIAGNOSIS — Z51.5 ENCOUNTER FOR PALLIATIVE CARE: ICD-10-CM

## 2025-02-26 DIAGNOSIS — Z86.79 STATUS POST ENDOVASCULAR ANEURYSM REPAIR (EVAR): ICD-10-CM

## 2025-02-26 DIAGNOSIS — Z89.612 STATUS POST ABOVE-KNEE AMPUTATION OF LEFT LOWER EXTREMITY (HCC): ICD-10-CM

## 2025-02-26 DIAGNOSIS — R65.21 SEPTIC SHOCK (HCC): ICD-10-CM

## 2025-02-26 DIAGNOSIS — A41.9 SEPTIC SHOCK (HCC): ICD-10-CM

## 2025-02-26 DIAGNOSIS — I73.9 PERIPHERAL VASCULAR DISEASE, UNSPECIFIED: Primary | ICD-10-CM

## 2025-02-26 DIAGNOSIS — N17.9 ACUTE KIDNEY INJURY: ICD-10-CM

## 2025-02-26 DIAGNOSIS — I70.321: Primary | ICD-10-CM

## 2025-02-26 DIAGNOSIS — E80.6 HYPERBILIRUBINEMIA: ICD-10-CM

## 2025-02-26 DIAGNOSIS — Z98.890 STATUS POST ENDOVASCULAR ANEURYSM REPAIR (EVAR): ICD-10-CM

## 2025-02-26 LAB
ALBUMIN SERPL-MCNC: 2.1 G/DL (ref 3.4–5)
ALBUMIN/GLOB SERPL: 0.5 (ref 0.8–1.7)
ALP SERPL-CCNC: 87 U/L (ref 45–117)
ALT SERPL-CCNC: 29 U/L (ref 16–61)
ANION GAP SERPL CALC-SCNC: 10 MMOL/L (ref 3–18)
APTT PPP: 28.6 SEC (ref 23–36.4)
AST SERPL-CCNC: 37 U/L (ref 10–38)
BASOPHILS # BLD: 0 K/UL (ref 0–0.1)
BASOPHILS NFR BLD: 0 % (ref 0–2)
BILIRUB DIRECT SERPL-MCNC: 0.5 MG/DL (ref 0–0.2)
BILIRUB SERPL-MCNC: 1.3 MG/DL (ref 0.2–1)
BUN SERPL-MCNC: 50 MG/DL (ref 7–18)
BUN/CREAT SERPL: 21 (ref 12–20)
CALCIUM SERPL-MCNC: 9 MG/DL (ref 8.5–10.1)
CHLORIDE SERPL-SCNC: 113 MMOL/L (ref 100–111)
CO2 SERPL-SCNC: 21 MMOL/L (ref 21–32)
CREAT SERPL-MCNC: 2.4 MG/DL (ref 0.6–1.3)
D DIMER PPP FEU-MCNC: 2.19 UG/ML(FEU)
DIFFERENTIAL METHOD BLD: ABNORMAL
EOSINOPHIL # BLD: 0 K/UL (ref 0–0.4)
EOSINOPHIL NFR BLD: 0 % (ref 0–5)
ERYTHROCYTE [DISTWIDTH] IN BLOOD BY AUTOMATED COUNT: 20 % (ref 11.6–14.5)
FIBRINOGEN PPP-MCNC: 148 MG/DL (ref 210–451)
GLOBULIN SER CALC-MCNC: 4.4 G/DL (ref 2–4)
GLUCOSE SERPL-MCNC: 87 MG/DL (ref 74–99)
HCT VFR BLD AUTO: 45.2 % (ref 36–48)
HGB BLD-MCNC: 13.7 G/DL (ref 13–16)
IMM GRANULOCYTES # BLD AUTO: 0 K/UL (ref 0–0.04)
IMM GRANULOCYTES NFR BLD AUTO: 0 % (ref 0–0.5)
INR PPP: 1.7 (ref 0.9–1.1)
LACTATE SERPL-SCNC: 2.8 MMOL/L (ref 0.4–2)
LYMPHOCYTES # BLD: 0.45 K/UL (ref 0.9–3.6)
LYMPHOCYTES NFR BLD: 2 % (ref 21–52)
MCH RBC QN AUTO: 27.9 PG (ref 24–34)
MCHC RBC AUTO-ENTMCNC: 30.3 G/DL (ref 31–37)
MCV RBC AUTO: 92.1 FL (ref 78–100)
MONOCYTES # BLD: 0.9 K/UL (ref 0.05–1.2)
MONOCYTES NFR BLD: 4 % (ref 3–10)
NEUTS SEG # BLD: 21.25 K/UL (ref 1.8–8)
NEUTS SEG NFR BLD: 94 % (ref 40–73)
NRBC # BLD: 0 K/UL (ref 0–0.01)
NRBC BLD-RTO: 0 PER 100 WBC
PLATELET # BLD AUTO: 128 K/UL (ref 135–420)
PLATELET COMMENT: ABNORMAL
PMV BLD AUTO: 11.7 FL (ref 9.2–11.8)
POTASSIUM SERPL-SCNC: 5.1 MMOL/L (ref 3.5–5.5)
PROCALCITONIN SERPL-MCNC: 0.87 NG/ML
PROT SERPL-MCNC: 6.5 G/DL (ref 6.4–8.2)
PROTHROMBIN TIME: 20.2 SEC (ref 11.9–14.9)
RBC # BLD AUTO: 4.91 M/UL (ref 4.35–5.65)
RBC MORPH BLD: ABNORMAL
SODIUM SERPL-SCNC: 144 MMOL/L (ref 136–145)
WBC # BLD AUTO: 22.6 K/UL (ref 4.6–13.2)

## 2025-02-26 PROCEDURE — 85384 FIBRINOGEN ACTIVITY: CPT

## 2025-02-26 PROCEDURE — 84145 PROCALCITONIN (PCT): CPT

## 2025-02-26 PROCEDURE — 96374 THER/PROPH/DIAG INJ IV PUSH: CPT

## 2025-02-26 PROCEDURE — G8419 CALC BMI OUT NRM PARAM NOF/U: HCPCS | Performed by: SURGERY

## 2025-02-26 PROCEDURE — 99222 1ST HOSP IP/OBS MODERATE 55: CPT | Performed by: STUDENT IN AN ORGANIZED HEALTH CARE EDUCATION/TRAINING PROGRAM

## 2025-02-26 PROCEDURE — 2580000003 HC RX 258: Performed by: EMERGENCY MEDICINE

## 2025-02-26 PROCEDURE — 1036F TOBACCO NON-USER: CPT | Performed by: SURGERY

## 2025-02-26 PROCEDURE — G8428 CUR MEDS NOT DOCUMENT: HCPCS | Performed by: SURGERY

## 2025-02-26 PROCEDURE — 1123F ACP DISCUSS/DSCN MKR DOCD: CPT | Performed by: SURGERY

## 2025-02-26 PROCEDURE — 85730 THROMBOPLASTIN TIME PARTIAL: CPT

## 2025-02-26 PROCEDURE — 1111F DSCHRG MED/CURRENT MED MERGE: CPT | Performed by: SURGERY

## 2025-02-26 PROCEDURE — 99215 OFFICE O/P EST HI 40 MIN: CPT | Performed by: SURGERY

## 2025-02-26 PROCEDURE — 85379 FIBRIN DEGRADATION QUANT: CPT

## 2025-02-26 PROCEDURE — 1100000000 HC RM PRIVATE

## 2025-02-26 PROCEDURE — 96361 HYDRATE IV INFUSION ADD-ON: CPT

## 2025-02-26 PROCEDURE — 85025 COMPLETE CBC W/AUTO DIFF WBC: CPT

## 2025-02-26 PROCEDURE — 99285 EMERGENCY DEPT VISIT HI MDM: CPT

## 2025-02-26 PROCEDURE — 83605 ASSAY OF LACTIC ACID: CPT

## 2025-02-26 PROCEDURE — 85610 PROTHROMBIN TIME: CPT

## 2025-02-26 PROCEDURE — 80048 BASIC METABOLIC PNL TOTAL CA: CPT

## 2025-02-26 PROCEDURE — 80076 HEPATIC FUNCTION PANEL: CPT

## 2025-02-26 PROCEDURE — 96365 THER/PROPH/DIAG IV INF INIT: CPT

## 2025-02-26 PROCEDURE — 6360000002 HC RX W HCPCS: Performed by: EMERGENCY MEDICINE

## 2025-02-26 PROCEDURE — 2500000003 HC RX 250 WO HCPCS: Performed by: EMERGENCY MEDICINE

## 2025-02-26 RX ORDER — VANCOMYCIN 1.75 G/350ML
25 INJECTION, SOLUTION INTRAVENOUS ONCE
Status: COMPLETED | OUTPATIENT
Start: 2025-02-26 | End: 2025-02-27

## 2025-02-26 RX ORDER — 0.9 % SODIUM CHLORIDE 0.9 %
30 INTRAVENOUS SOLUTION INTRAVENOUS ONCE
Status: COMPLETED | OUTPATIENT
Start: 2025-02-26 | End: 2025-02-27

## 2025-02-26 RX ADMIN — VANCOMYCIN 1250 MG: 1.75 INJECTION, SOLUTION INTRAVENOUS at 22:25

## 2025-02-26 RX ADMIN — WATER 2000 MG: 1 INJECTION INTRAMUSCULAR; INTRAVENOUS; SUBCUTANEOUS at 22:21

## 2025-02-26 RX ADMIN — SODIUM CHLORIDE 1551 ML: 9 INJECTION, SOLUTION INTRAVENOUS at 22:21

## 2025-02-26 NOTE — PROGRESS NOTES
[facility]    Vascular Surgery Office Visit    Stefano TAYLOR Shy Riddle  Chief Complaint   Patient presents with    Leg Pain     Follow up, Discharge         History:  84 y.o. male s/p LLE AKA for progressive nonsalvagable gangrene on 1/22/25 by Dr. Miller, returns to the office for an unscheduled visit due to a different concern. He was sent from his rehabilitation facility for coolness and discoloration of his RIGHT lower extremity.  He has a prior hx of a R TMA in the past.     The pt is not accompanied by anyone. He is unable to provide history or comment on his current situation. He complains of \"burning hot\" of his L AKA.  He complains of pain of his R LE when I tried to perform an examination.     Chart review demonstrates that he was seen in the ER 4 d ago for discoloration and coolness of his RLE TMA stump. A CTA a/p with BLE runoff was obtained at that time. There were signs of a prior RLE fem tibial bypass which was occluded. His R CFA and PFA were patent. He was discharged home with plans for short term f/u with vascular surgery as an outpatient.      Physical Exam:    /76   Pulse 95   Ht 1.778 m (5' 10\")   Wt 51.7 kg (114 lb)   SpO2 99%   BMI 16.36 kg/m²      Constitutional:  Patient is well developed. He is thin. He appears uncomfortable. His head is hanging down during the entire visit.   HEENT: Atraumatic, normocephalic.   Cardiovascular:  Normal rate, regular rhythm, normal heart sounds.  Pulmonary/Chest: Effort normal and breath sounds normal.    Abdominal:   Soft, non-distended. No tenderness to palpation.   Extremities:   LLE AKA incision: Staples removed without event. No erythema, induration or drainage. Minimal tenderness.   RLE cool with dusky discoloration of the prior TMA stump. There is mild edema with a skin thinning and blistering of the TMA. The TMA is cool. The R thigh is warm. The distal R leg is cool.       Neurological: He is alert. He is not oriented

## 2025-02-27 ENCOUNTER — ANESTHESIA (OUTPATIENT)
Dept: CARDIOTHORACIC SURGERY | Facility: HOSPITAL | Age: 85
End: 2025-02-27
Payer: MEDICARE

## 2025-02-27 ENCOUNTER — ANESTHESIA EVENT (OUTPATIENT)
Dept: CARDIOTHORACIC SURGERY | Facility: HOSPITAL | Age: 85
End: 2025-02-27
Payer: MEDICARE

## 2025-02-27 PROBLEM — R65.21 SEPTIC SHOCK (HCC): Status: ACTIVE | Noted: 2025-02-27

## 2025-02-27 PROBLEM — Z51.5 ENCOUNTER FOR PALLIATIVE CARE: Status: ACTIVE | Noted: 2025-02-27

## 2025-02-27 PROBLEM — N17.9 ACUTE KIDNEY INJURY: Status: ACTIVE | Noted: 2025-02-27

## 2025-02-27 PROBLEM — A41.9 SEPTIC SHOCK (HCC): Status: ACTIVE | Noted: 2025-02-27

## 2025-02-27 PROBLEM — I70.321: Status: ACTIVE | Noted: 2025-01-23

## 2025-02-27 PROBLEM — E80.6 HYPERBILIRUBINEMIA: Status: ACTIVE | Noted: 2025-02-27

## 2025-02-27 PROBLEM — G93.40 ENCEPHALOPATHY ACUTE: Status: ACTIVE | Noted: 2025-02-27

## 2025-02-27 PROBLEM — I70.321: Status: ACTIVE | Noted: 2025-02-27

## 2025-02-27 LAB
ANION GAP SERPL CALC-SCNC: 14 MMOL/L (ref 3–18)
BACTERIA SPEC CULT: NORMAL
BACTERIA SPEC CULT: NORMAL
BASOPHILS # BLD: 0.04 K/UL (ref 0–0.1)
BASOPHILS NFR BLD: 0.2 % (ref 0–2)
BUN SERPL-MCNC: 52 MG/DL (ref 7–18)
BUN/CREAT SERPL: 24 (ref 12–20)
C DIFF GDH STL QL: NEGATIVE
C DIFF TOX A+B STL QL IA: NEGATIVE
C DIFF TOXIN INTERPRETATION: NORMAL
CALCIUM SERPL-MCNC: 7.9 MG/DL (ref 8.5–10.1)
CHLORIDE SERPL-SCNC: 116 MMOL/L (ref 100–111)
CO2 SERPL-SCNC: 19 MMOL/L (ref 21–32)
CREAT SERPL-MCNC: 2.16 MG/DL (ref 0.6–1.3)
DIFFERENTIAL METHOD BLD: ABNORMAL
EOSINOPHIL # BLD: 0 K/UL (ref 0–0.4)
EOSINOPHIL NFR BLD: 0 % (ref 0–5)
ERYTHROCYTE [DISTWIDTH] IN BLOOD BY AUTOMATED COUNT: 19.9 % (ref 11.6–14.5)
GLUCOSE BLD STRIP.AUTO-MCNC: 100 MG/DL (ref 70–110)
GLUCOSE BLD STRIP.AUTO-MCNC: 77 MG/DL (ref 70–110)
GLUCOSE BLD STRIP.AUTO-MCNC: 79 MG/DL (ref 70–110)
GLUCOSE SERPL-MCNC: 66 MG/DL (ref 74–99)
HCT VFR BLD AUTO: 38.6 % (ref 36–48)
HGB BLD-MCNC: 11.2 G/DL (ref 13–16)
IMM GRANULOCYTES # BLD AUTO: 0.17 K/UL (ref 0–0.04)
IMM GRANULOCYTES NFR BLD AUTO: 0.7 % (ref 0–0.5)
INR PPP: 1.6 (ref 0.9–1.1)
LACTATE SERPL-SCNC: 2.1 MMOL/L (ref 0.4–2)
LACTATE SERPL-SCNC: 2.8 MMOL/L (ref 0.4–2)
LYMPHOCYTES # BLD: 0.91 K/UL (ref 0.9–3.6)
LYMPHOCYTES NFR BLD: 3.8 % (ref 21–52)
MCH RBC QN AUTO: 27.8 PG (ref 24–34)
MCHC RBC AUTO-ENTMCNC: 29 G/DL (ref 31–37)
MCV RBC AUTO: 95.8 FL (ref 78–100)
MONOCYTES # BLD: 1.42 K/UL (ref 0.05–1.2)
MONOCYTES NFR BLD: 5.9 % (ref 3–10)
NEUTS SEG # BLD: 21.61 K/UL (ref 1.8–8)
NEUTS SEG NFR BLD: 89.4 % (ref 40–73)
NRBC # BLD: 0 K/UL (ref 0–0.01)
NRBC BLD-RTO: 0 PER 100 WBC
PLATELET # BLD AUTO: 100 K/UL (ref 135–420)
PMV BLD AUTO: 12.3 FL (ref 9.2–11.8)
POTASSIUM SERPL-SCNC: 4.5 MMOL/L (ref 3.5–5.5)
PROTHROMBIN TIME: 19.5 SEC (ref 11.9–14.9)
RBC # BLD AUTO: 4.03 M/UL (ref 4.35–5.65)
SERVICE CMNT-IMP: NORMAL
SERVICE CMNT-IMP: NORMAL
SODIUM SERPL-SCNC: 149 MMOL/L (ref 136–145)
VANCOMYCIN SERPL-MCNC: 15.1 UG/ML (ref 5–40)
WBC # BLD AUTO: 24.2 K/UL (ref 4.6–13.2)

## 2025-02-27 PROCEDURE — 82962 GLUCOSE BLOOD TEST: CPT

## 2025-02-27 PROCEDURE — 87449 NOS EACH ORGANISM AG IA: CPT

## 2025-02-27 PROCEDURE — 87324 CLOSTRIDIUM AG IA: CPT

## 2025-02-27 PROCEDURE — 2140000001 HC CVICU INTERMEDIATE R&B

## 2025-02-27 PROCEDURE — 94761 N-INVAS EAR/PLS OXIMETRY MLT: CPT

## 2025-02-27 PROCEDURE — 2580000003 HC RX 258: Performed by: STUDENT IN AN ORGANIZED HEALTH CARE EDUCATION/TRAINING PROGRAM

## 2025-02-27 PROCEDURE — 99223 1ST HOSP IP/OBS HIGH 75: CPT | Performed by: SURGERY

## 2025-02-27 PROCEDURE — 83605 ASSAY OF LACTIC ACID: CPT

## 2025-02-27 PROCEDURE — 99223 1ST HOSP IP/OBS HIGH 75: CPT

## 2025-02-27 PROCEDURE — 6360000002 HC RX W HCPCS: Performed by: STUDENT IN AN ORGANIZED HEALTH CARE EDUCATION/TRAINING PROGRAM

## 2025-02-27 PROCEDURE — 85025 COMPLETE CBC W/AUTO DIFF WBC: CPT

## 2025-02-27 PROCEDURE — 85610 PROTHROMBIN TIME: CPT

## 2025-02-27 PROCEDURE — 36415 COLL VENOUS BLD VENIPUNCTURE: CPT

## 2025-02-27 PROCEDURE — 87040 BLOOD CULTURE FOR BACTERIA: CPT

## 2025-02-27 PROCEDURE — 80048 BASIC METABOLIC PNL TOTAL CA: CPT

## 2025-02-27 PROCEDURE — 2500000003 HC RX 250 WO HCPCS: Performed by: STUDENT IN AN ORGANIZED HEALTH CARE EDUCATION/TRAINING PROGRAM

## 2025-02-27 PROCEDURE — 80202 ASSAY OF VANCOMYCIN: CPT

## 2025-02-27 PROCEDURE — 99232 SBSQ HOSP IP/OBS MODERATE 35: CPT | Performed by: INTERNAL MEDICINE

## 2025-02-27 RX ORDER — SODIUM CHLORIDE 0.9 % (FLUSH) 0.9 %
5-40 SYRINGE (ML) INJECTION PRN
Status: DISCONTINUED | OUTPATIENT
Start: 2025-02-27 | End: 2025-03-03 | Stop reason: HOSPADM

## 2025-02-27 RX ORDER — SODIUM CHLORIDE 0.9 % (FLUSH) 0.9 %
5-40 SYRINGE (ML) INJECTION EVERY 12 HOURS SCHEDULED
Status: DISCONTINUED | OUTPATIENT
Start: 2025-02-27 | End: 2025-03-03 | Stop reason: HOSPADM

## 2025-02-27 RX ORDER — ONDANSETRON 4 MG/1
4 TABLET, ORALLY DISINTEGRATING ORAL EVERY 8 HOURS PRN
Status: DISCONTINUED | OUTPATIENT
Start: 2025-02-27 | End: 2025-02-27

## 2025-02-27 RX ORDER — LORAZEPAM 2 MG/ML
1 CONCENTRATE ORAL
Status: DISCONTINUED | OUTPATIENT
Start: 2025-02-27 | End: 2025-03-03 | Stop reason: HOSPADM

## 2025-02-27 RX ORDER — POTASSIUM CHLORIDE 1500 MG/1
40 TABLET, EXTENDED RELEASE ORAL PRN
Status: DISCONTINUED | OUTPATIENT
Start: 2025-02-27 | End: 2025-02-27

## 2025-02-27 RX ORDER — MORPHINE SULFATE 20 MG/ML
10 SOLUTION ORAL
Status: DISCONTINUED | OUTPATIENT
Start: 2025-02-27 | End: 2025-03-03 | Stop reason: HOSPADM

## 2025-02-27 RX ORDER — GLYCOPYRROLATE 0.2 MG/ML
0.2 INJECTION INTRAMUSCULAR; INTRAVENOUS EVERY 4 HOURS PRN
Status: DISCONTINUED | OUTPATIENT
Start: 2025-02-27 | End: 2025-03-03 | Stop reason: HOSPADM

## 2025-02-27 RX ORDER — SODIUM CHLORIDE 0.9 % (FLUSH) 0.9 %
5-40 SYRINGE (ML) INJECTION EVERY 12 HOURS SCHEDULED
Status: DISCONTINUED | OUTPATIENT
Start: 2025-02-27 | End: 2025-02-27

## 2025-02-27 RX ORDER — POTASSIUM CHLORIDE 7.45 MG/ML
10 INJECTION INTRAVENOUS PRN
Status: DISCONTINUED | OUTPATIENT
Start: 2025-02-27 | End: 2025-02-27

## 2025-02-27 RX ORDER — ACETAMINOPHEN 325 MG/1
650 TABLET ORAL EVERY 6 HOURS PRN
Status: DISCONTINUED | OUTPATIENT
Start: 2025-02-27 | End: 2025-02-27

## 2025-02-27 RX ORDER — POLYETHYLENE GLYCOL 3350 17 G/17G
17 POWDER, FOR SOLUTION ORAL DAILY
Status: DISCONTINUED | OUTPATIENT
Start: 2025-02-27 | End: 2025-02-27

## 2025-02-27 RX ORDER — SODIUM CHLORIDE 9 MG/ML
INJECTION, SOLUTION INTRAVENOUS PRN
Status: DISCONTINUED | OUTPATIENT
Start: 2025-02-27 | End: 2025-02-27

## 2025-02-27 RX ORDER — LOPERAMIDE HYDROCHLORIDE 2 MG/1
2 CAPSULE ORAL PRN
Status: DISCONTINUED | OUTPATIENT
Start: 2025-02-27 | End: 2025-03-03 | Stop reason: HOSPADM

## 2025-02-27 RX ORDER — POLYETHYLENE GLYCOL 3350 17 G/17G
17 POWDER, FOR SOLUTION ORAL DAILY PRN
Status: DISCONTINUED | OUTPATIENT
Start: 2025-02-27 | End: 2025-02-27

## 2025-02-27 RX ORDER — SODIUM CHLORIDE, SODIUM LACTATE, POTASSIUM CHLORIDE, CALCIUM CHLORIDE 600; 310; 30; 20 MG/100ML; MG/100ML; MG/100ML; MG/100ML
INJECTION, SOLUTION INTRAVENOUS CONTINUOUS
Status: DISCONTINUED | OUTPATIENT
Start: 2025-02-27 | End: 2025-02-27

## 2025-02-27 RX ORDER — ONDANSETRON 2 MG/ML
4 INJECTION INTRAMUSCULAR; INTRAVENOUS EVERY 6 HOURS PRN
Status: DISCONTINUED | OUTPATIENT
Start: 2025-02-27 | End: 2025-03-03 | Stop reason: HOSPADM

## 2025-02-27 RX ORDER — 0.9 % SODIUM CHLORIDE 0.9 %
1000 INTRAVENOUS SOLUTION INTRAVENOUS ONCE
Status: COMPLETED | OUTPATIENT
Start: 2025-02-27 | End: 2025-02-27

## 2025-02-27 RX ORDER — ACETAMINOPHEN 650 MG/1
650 SUPPOSITORY RECTAL EVERY 6 HOURS PRN
Status: DISCONTINUED | OUTPATIENT
Start: 2025-02-27 | End: 2025-03-03 | Stop reason: HOSPADM

## 2025-02-27 RX ADMIN — SODIUM CHLORIDE, PRESERVATIVE FREE 10 ML: 5 INJECTION INTRAVENOUS at 20:45

## 2025-02-27 RX ADMIN — PIPERACILLIN AND TAZOBACTAM 4500 MG: 4; .5 INJECTION, POWDER, FOR SOLUTION INTRAVENOUS at 02:44

## 2025-02-27 RX ADMIN — SODIUM CHLORIDE, POTASSIUM CHLORIDE, SODIUM LACTATE AND CALCIUM CHLORIDE: 600; 310; 30; 20 INJECTION, SOLUTION INTRAVENOUS at 02:58

## 2025-02-27 RX ADMIN — SODIUM CHLORIDE 1000 ML: 9 INJECTION, SOLUTION INTRAVENOUS at 07:45

## 2025-02-27 ASSESSMENT — PAIN SCALES - GENERAL: PAINLEVEL_OUTOF10: 0

## 2025-02-27 NOTE — CONSULTS
[facility]       History:   84 y.o. male s/p LLE AKA for progressive nonsalvagable gangrene on 1/22/25 by Dr. Miller. He was seen in our office, and now hospitalized for subacute, progressive RLE ischemia with an occluded tibial bypass. He is currently being treated for shock, possibly due to sepsis.     Past Medical History:   Diagnosis Date    BPH (benign prostatic hyperplasia) 12/24/2024    Chronic CHF (congestive heart failure) (HCC) 12/24/2024    Depression     History of atrial fibrillation 12/24/2024    Hyperlipidemia     Hypertension     Hypothyroidism 12/24/2024    Peripheral arterial disease 12/24/2024    Peripheral arterial disease      Past Surgical History:   Procedure Laterality Date    FOOT AMPUTATION THROUGH METATARSAL Left     Left foot all toes amputated    LEG SURGERY Left 1/25/2025    LEFT TROCHANTERIC FEMORAL NAIL HARDWARE REMOVAL; C-ARM; [DEPUY SYNTHES ORTHO] performed by Ronald Villarreal MD at [facility] MAIN OR    LEG SURGERY Left 1/25/2025    LEFT LEG ABOVE KNEE AMPUTATION performed by Larry Miller MD at [facility] MAIN OR     No current facility-administered medications on file prior to encounter.     Current Outpatient Medications on File Prior to Encounter   Medication Sig Dispense Refill    lactobacillus (CULTURELLE) capsule Take 1 capsule by mouth daily (with breakfast) 60 capsule 0    Multiple Vitamin (MULTIVITAMIN) TABS tablet Take 1 tablet by mouth daily 30 tablet 0    thiamine mononitrate (THIAMINE) 100 MG tablet Take 1 tablet by mouth daily 30 tablet 0    atenolol (TENORMIN) 50 MG tablet Take 1 tablet by mouth daily      pantoprazole (PROTONIX) 40 MG tablet Take 1 tablet by mouth as needed (daily prn)      rosuvastatin (CRESTOR) 40 MG tablet Take 1 tablet by mouth every evening      traZODone (DESYREL) 50 MG tablet Take 1 tablet by mouth nightly      sertraline (ZOLOFT) 50 MG tablet Take 1 tablet by mouth daily      levothyroxine (SYNTHROID) 50 MCG tablet Take 1

## 2025-02-27 NOTE — CONSULTS
Palliative Medicine Initial Consult  Patient Name: Stefano Begum Jr.  YOB: 1940  MRN: [id number]  Age: 84 y.o.  Gender: male    Date of Initial Consult: 2/27/2025  Date of Service: 2/27/2025  Time: 2:41 PM  [facility]  Hospital Day: 2  Admit Date: 2/26/2025  Referring Provider: Dr. Asencio       Reasons for Consultation:  Goals of Care    HISTORY OF PRESENT ILLNESS (HPI):   Stefano Begum Jr. is a 84 y.o. male with a past medical history of depression, CHF, atrial fibrillation, HTN, HLD, PAD, left AKA,gangrenous right lower extremity, who was admitted on 2/26/2025 from [facility] fast track with a diagnosis of critical limb ischemia of right lower extremity. Per chart review patient was found to be hypotensive and tachycardic at facility with fingers blue and discolored right root. On arrival he had an altered mentation but complained of leg pain. Vascular surgery was consulted for a planned amputation of above the knee. Palliative care was consulted for goals of care.     Psychosocial: (retrieved from past consult note) Patient is  to Dinorah who currently lives at long term care facility. He has 2 sons, Balwinder (lives in [place]) and Ash (lives in [place]), 2 daughters in [place] and [place] who are estranged for several years. He has 17 grandchildren, and 12 great-grandchildren. 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 He use to be an avid outdoors man, who loved 'the mountains and outdoor sports.'     2/27/2025 Patient lying on right side, obtunded and not responsive to voice or touch, on [place] oxygen, SBP is in 80's, looks imminent.      PALLIATIVE DIAGNOSES:    Encounter for palliative care  Goals of care  Critical limb ischemia of right lower extremity  Acute encephalopathy  Septic shock  Severe protein calorie malnutrion    ASSESSMENT AND PLAN:

## 2025-02-27 NOTE — PLAN OF CARE
Problem: Chronic Conditions and Co-morbidities  Goal: Patient's chronic conditions and co-morbidity symptoms are monitored and maintained or improved  2/27/2025 0623 by Eden Elena, RN  Outcome: Progressing  Flowsheets (Taken 2/27/2025 0623)  Care Plan - Patient's Chronic Conditions and Co-Morbidity Symptoms are Monitored and Maintained or Improved:   Monitor and assess patient's chronic conditions and comorbid symptoms for stability, deterioration, or improvement   Collaborate with multidisciplinary team to address chronic and comorbid conditions and prevent exacerbation or deterioration   Update acute care plan with appropriate goals if chronic or comorbid symptoms are exacerbated and prevent overall improvement and discharge  2/27/2025 0622 by Eden Elena, RN  Outcome: Progressing     Problem: Skin/Tissue Integrity  Goal: Skin integrity remains intact  Description: 1.  Monitor for areas of redness and/or skin breakdown  2.  Assess vascular access sites hourly  3.  Every 4-6 hours minimum:  Change oxygen saturation probe site  4.  Every 4-6 hours:  If on nasal continuous positive airway pressure, respiratory therapy assess nares and determine need for appliance change or resting period  Outcome: Progressing

## 2025-02-27 NOTE — ANESTHESIA PRE PROCEDURE
Department of Anesthesiology  Preprocedure Note       Name:  Stefano Begum Jr.   Age:  84 y.o.  :  1940                                          MRN:  [id number]         Date:  2025      Surgeon: Surgeon(s):  Merlin Jimenez MD    Procedure: Procedure(s):  RIGHT LOWER EXTREMITY GILLITINE AMPUTATION    Medications prior to admission:   Prior to Admission medications    Medication Sig Start Date End Date Taking? Authorizing Provider   lactobacillus (CULTURELLE) capsule Take 1 capsule by mouth daily (with breakfast) 25   Robb Marcelo MD   Multiple Vitamin (MULTIVITAMIN) TABS tablet Take 1 tablet by mouth daily 25   Robb Marcelo MD   thiamine mononitrate (THIAMINE) 100 MG tablet Take 1 tablet by mouth daily 25   Robb Marcelo MD   atenolol (TENORMIN) 50 MG tablet Take 1 tablet by mouth daily    Rita Solitario MD   pantoprazole (PROTONIX) 40 MG tablet Take 1 tablet by mouth as needed (daily prn)    Rita Solitario MD   rosuvastatin (CRESTOR) 40 MG tablet Take 1 tablet by mouth every evening    Rita Solitario MD   traZODone (DESYREL) 50 MG tablet Take 1 tablet by mouth nightly    Rita Solitario MD   sertraline (ZOLOFT) 50 MG tablet Take 1 tablet by mouth daily    Rita Solitario MD   levothyroxine (SYNTHROID) 50 MCG tablet Take 1 tablet by mouth Daily    Rita Solitario MD       Current medications:    Current Facility-Administered Medications   Medication Dose Route Frequency Provider Last Rate Last Admin   • sodium chloride flush 0.9 % injection 5-40 mL  5-40 mL IntraVENous 2 times per day Diomedes Sargent MD       • sodium chloride flush 0.9 % injection 5-40 mL  5-40 mL IntraVENous PRN Diomedes Sargent MD       • 0.9 % sodium chloride infusion   IntraVENous PRN Diomedes Sargent MD       • ondansetron (ZOFRAN-ODT) disintegrating tablet 4 mg  4 mg Oral Q8H PRN Diomedes Sargent MD        Or   • ondansetron (ZOFRAN) injection 4 mg

## 2025-02-27 NOTE — CONSULTS
Comprehensive Nutrition Assessment    Type and Reason for Visit:  Initial, Consult, Positive nutrition screen    Nutrition Recommendations/Plan:   Continue NPO, consider advancing diet for comfort.   Monitor for changes in goals of cafe during admission.      Malnutrition Assessment:  Malnutrition Status:  Insufficient data (comfort measures only) (02/27/25 1800)    Context:  Chronic Illness       Nutrition History and Allergies:      Past Medical History:   Diagnosis Date    BPH (benign prostatic hyperplasia) 12/24/2024    Chronic CHF (congestive heart failure) (HCC) 12/24/2024    Depression     History of atrial fibrillation 12/24/2024    Hyperlipidemia     Hypertension     Hypothyroidism 12/24/2024    Peripheral arterial disease 12/24/2024    Peripheral arterial disease    Pt s/p L AKA.   Pt presents with leg pain and altered mentation.     Weight Hx: per EMR review and current wt without source, Wt change: +11.2 lb (10.9%) x 30 days,  -63 lb (35.6%) x 10 months - significant, note pt s/p left AKA 1/25/25.   Wt Readings from Last 10 Encounters:   02/26/25 51.7 kg (114 lb)   02/21/25 51.7 kg (114 lb)   02/20/25 54.4 kg (120 lb)   01/24/25 46.6 kg (102 lb 11.8 oz)   01/22/25 46.7 kg (103 lb)   01/05/25 64.6 kg (142 lb 6.4 oz)   04/19/24 80.3 kg (177 lb)     NFPE: unable to perform NFPE. Food Allergies: NKFA    Nutrition Assessment:    Admitted for management of critical limb ischemia of right lower extremity. Pt seen for - Consult: General Nutrition Management - \"severe protein calorie malnutrition.\"  Low BMI (16.4) also noted. S/p left AKA. Per vascular note 2/27- pt posted for R BKA today. Per chart, procedure cancelled d/t goals of care. Per palliative care note 2/27- pt transitioned to DNR/DNI, comfort message only. Nutrition interventions no longer appropriate per goals of care. Continue to monitor goals of care during admission.    Nutrition Related Findings:    Pertinent Meds:  Reviewed.  Pertinent

## 2025-02-27 NOTE — PROGRESS NOTES
[facility]  Progress Note    Patient: Stefano Begum Jr. Age: 84 y.o. : 1940 MR#: [id number] SSN: [id number]  Date/Time: 2025     Subjective:   Unfortunately patient doing poorly today.  After discussion with family specifically the son the patient was made comfort care.  No planned procedures.  Patient currently altered but does not appear to be in any distress    Dispo; Hospice  DC: 25    Review of systems  General: No fevers or chills.  Cardiovascular: No chest pain or pressure. No palpitations.   Pulmonary: No shortness of breath, cough or wheeze.   Gastrointestinal: No abdominal pain, nausea, vomiting or diarrhea.   Genitourinary: No urinary frequency, urgency, hesitancy or dysuria.   Musculoskeletal: No joint or muscle pain, no back pain, no recent trauma.    Neurologic: No headache, numbness, tingling or weakness.   Assessment/Plan:   Critical limb ischemia of the right lower extremity  Severe sepsis of the right lower extremity  Acute kidney injury  Severe protein calorie malnutrition  History of EVAR    Admitted to CVT stepdown  Stable to transfer to a different unit now that patient is comfort care  Continue comfort measures  Morphine and Ativan as needed ordered  Discussed with family and they are in agreement with DNR and comfort care measure status.  Discontinue antibiotics  No procedures planned  Hospice consult placed  Discontinue labs      I spent 40 minutes with the patient in face-to-face consultation, of which greater than 50% was spent in counseling and coordination of care as described above.    Case discussed with:  [x]Patient  [x]Family  []Nursing  []Case Management    Objective:   VS: /74   Pulse (!) 102   Temp 98.4 °F (36.9 °C) (Rectal)   Resp 24   Ht 1.778 m (5' 10\")   SpO2 99%   BMI 16.36 kg/m²    Tmax/24hrs: Temp (24hrs), Av °F (36.7 °C), Min:97.5 °F (36.4 °C), Max:98.4 °F (36.9 °C)  IOBRIEF  Intake/Output Summary  (L) 135 - 420 K/uL    MPV 12.3 (H) 9.2 - 11.8 FL    Nucleated RBCs 0.0 0  WBC    nRBC 0.00 0.00 - 0.01 K/uL    Neutrophils % 89.4 (H) 40.0 - 73.0 %    Lymphocytes % 3.8 (L) 21.0 - 52.0 %    Monocytes % 5.9 3.0 - 10.0 %    Eosinophils % 0.0 0.0 - 5.0 %    Basophils % 0.2 0.0 - 2.0 %    Immature Granulocytes % 0.7 (H) 0.0 - 0.5 %    Neutrophils Absolute 21.61 (H) 1.80 - 8.00 K/UL    Lymphocytes Absolute 0.91 0.90 - 3.60 K/UL    Monocytes Absolute 1.42 (H) 0.05 - 1.20 K/UL    Eosinophils Absolute 0.00 0.00 - 0.40 K/UL    Basophils Absolute 0.04 0.00 - 0.10 K/UL    Immature Granulocytes Absolute 0.17 (H) 0.00 - 0.04 K/UL    Differential Type AUTOMATED     Protime-INR    Collection Time: 02/27/25  4:43 AM   Result Value Ref Range    Protime 19.5 (H) 11.9 - 14.9 sec    INR 1.6 (H) 0.9 - 1.1     Vancomycin Level, Random    Collection Time: 02/27/25  4:43 AM   Result Value Ref Range    Vancomycin Rm 15.1 5.0 - 40.0 UG/ML   POCT Glucose    Collection Time: 02/27/25  7:22 AM   Result Value Ref Range    POC Glucose 79 70 - 110 mg/dL   POCT Glucose    Collection Time: 02/27/25  7:59 AM   Result Value Ref Range    POC Glucose 100 70 - 110 mg/dL   POCT Glucose    Collection Time: 02/27/25  9:31 AM   Result Value Ref Range    POC Glucose 77 70 - 110 mg/dL       Signed By: Nico Asencio DO     February 27, 2025      Disclaimer: Sections of this note are dictated using utilizing voice recognition software.  Minor typographical errors may be present. If questions arise, please do not hesitate to contact me or call our department.

## 2025-02-27 NOTE — ACP (ADVANCE CARE PLANNING)
Advance Care Planning   Healthcare Decision Maker:    Primary Decision Maker: Dinorah Begum - Spouse - [phone]     conducted an initial consultation and Spiritual Assessment for Stefano Begum Jr., who is a 84 y.o.,male. Patient’s Primary Language is: English.   According to the patient’s EMR Yazidi Affiliation is: Yazdanism.     The  provided the following Interventions:  Initiated a relationship of care and support with patient in and family in bed 2308  this morning. Patient was to go to surgery this morning for possible BKA but family chose to make patient comfort care this morning instead of having the patient  got to surgery. There is an advance directive on file.  Explored issues of marilyn, belief, spirituality and Mormonism/ritual needs while hospitalized.  Listened empathically.  Patient is not involved in a local Jehovah's witness due to his age and continuous poor health.  Offered prayer and assurance of continued prayers on patients behalf.     The following outcomes were achieved:  Patient shared limited information about his medical narrative and spiritual journey/beliefs.  Patient processed feeling about current hospitalization.  Patient expressed gratitude for pastoral care visit.    Spiritual Health History and Assessment/Progress Note  [facility]     ,  ,  ,      Name: Stefano Begum Jr. MRN: [id number]    Age: 84 y.o.     Sex: male   Language: English   Yarsani: Yazdanism   Critical limb ischemia of right lower extremity (HCC)     Date: 2/27/2025                           Spiritual Assessment began in [facility] 2 CV STEPDOWN                    Marilyn, Belief, Meaning:   Patient unable to assess at this time  Family/Friends No family/friends present      Importance and Influence:  Patient unable to assess at this time  Family/Friends No family/friends present    Community:  Patient Other: not able to assess patient as he is resting peacefully  Family/Friends No family/friends

## 2025-02-27 NOTE — ED NOTES
TRANSFER - OUT REPORT:    Verbal report given to OSVALDO Chadwick on Stefano Begum Jr.  being transferred to 2305 for routine progression of patient care       Report consisted of patient's Situation, Background, Assessment and   Recommendations(SBAR).     Information from the following report(s) Nurse Handoff Report, ED Encounter Summary, ED SBAR, MAR, and Neuro Assessment was reviewed with the receiving nurse.    [place] Fall Assessment:    Presents to emergency department  because of falls (Syncope, seizure, or loss of consciousness): No  Age > 70: Yes  Altered Mental Status, Intoxication with alcohol or substance confusion (Disorientation, impaired judgment, poor safety awaremess, or inability to follow instructions): Yes  Impaired Mobility: Ambulates or transfers with assistive devices or assistance; Unable to ambulate or transer.: Yes  Nursing Judgement: Yes          Lines:   Peripheral IV 02/26/25 Right Wrist (Active)   Site Assessment Clean, dry & intact 02/26/25 2200   Line Status Blood return noted;Flushed;Specimen collected 02/26/25 2200   Phlebitis Assessment No symptoms 02/26/25 2200   Infiltration Assessment 0 02/26/25 2200   Dressing Status New dressing applied 02/26/25 2200   Dressing Type Transparent 02/26/25 2200       Peripheral IV 02/26/25 Distal;Left Cephalic (Active)   Site Assessment Clean, dry & intact 02/26/25 2201   Line Status Blood return noted;Specimen collected;Flushed 02/26/25 2201   Phlebitis Assessment No symptoms 02/26/25 2201   Infiltration Assessment 0 02/26/25 2201   Dressing Status New dressing applied 02/26/25 2201   Dressing Type Transparent 02/26/25 2201        Opportunity for questions and clarification was provided.      Patient transported with:  Registered Nurse

## 2025-02-27 NOTE — PROGRESS NOTES
[facility]    I spoke with the pt's son Jenaro. He and his brother Reg have spoken at length this AM and decided that their father would not want to proceed with another amputation due to not having quality of life afterwards.  They would like to move forward with comfort care for their father. The primary physician, Dr. Asencio, has also spoken with Reg, and had a similar discussion. I have cancelled the operation. Dr. Asencio will move forward with comfort care orders and care.     MD [facility]  Vascular Surgeon

## 2025-02-27 NOTE — CARE COORDINATION
02/27/25 1558   Service Assessment   Patient Orientation Unable to Assess;Unresponsive   Cognition Other (see comment)   History Provided By Child/Family;Medical Record   Primary Caregiver Other (Comment)  (nursing facility)   Support Systems Spouse/Significant Other;Children;Family Members   Patient's Healthcare Decision Maker is: Named in Scanned ACP Document   PCP Verified by CM Yes   Last Visit to PCP Within last 3 months  (came from a nursing facility)   Prior Functional Level Assistance with the following:;Bathing;Dressing;Toileting;Shopping;Mobility   Current Functional Level Assistance with the following:;Dressing;Toileting;Cooking;Mobility   Can patient return to prior living arrangement Unknown at present   Ability to make needs known: Unable   Family able to assist with home care needs: No  ([place] of [place])   Would you like for me to discuss the discharge plan with any other family members/significant others, and if so, who?   (pt's children)   Financial Resources Medicare   Community Resources None   Social/Functional History   Lives With Other (Comment)  ([facility])   Type of Home Facility   Home Layout One level   Home Access Ramped entrance   Bathroom Accessibility Accessible   Home Equipment   (as needed in the nursing facility)   Receives Help From Other (Comment)  (nursing facility)   Prior Level of Assist for ADLs Needs assistance   Toileting Needs assistance   Prior Level of Assist for Homemaking Needs assistance   Ambulation Assistance Needs assistance   Prior Level of Assist for Transfers Needs assistance   Active  No   Patient's  Info medical transport   Mode of Transportation Other  (medical transport)   Occupation Retired   Discharge Planning   Type of Residence Long-Term Care   Living Arrangements Other (Comment)  ([facility])   Current Services Prior To Admission Skilled Nursing Facility   Current DME Prior to Arrival Other

## 2025-02-27 NOTE — PROGRESS NOTES
[facility]   Pharmacy Pharmacokinetic Monitoring Service - Vancomycin    Indication: Skin and Soft Tissue Infection  Target Concentration: Dosing based on anticipated concentration <15 mg/L due to renal impairment/insufficiency  Day of Therapy: 2  Additional Antimicrobials: Piperacillin/Tazobactam    Pertinent Laboratory Values:   Temp: 98.4 °F (36.9 °C)  Recent Labs     02/26/25  2140 02/26/25  2202 02/27/25  0443   CREATININE 2.40*  --  2.16*   BUN 50*  --  52*   WBC  --  22.6* 24.2*   PROCAL 0.87  --   --        Estimated Creatinine Clearance: 18 mL/min (A) (based on SCr of 2.16 mg/dL (H)).    Pertinent Cultures:  Culture Date Source Results   2/27 Blood NGTD       Assessment:  Date/Time Current Dose Concentration Timing of Concentration (h) AUC   2/26 1250 mg x1 - - -   2/27 750 mg 15.1 6.5 -   Note: Serum concentrations collected for AUC dosing may appear elevated if collected in close proximity to the dose administered, this is not necessarily an indication of toxicity    Plan:  Concentration-guided dosing due to renal impairment  Patient's renal function is abnormally low. Will dose by levels until renal function stabilizes.  Vancomycin 750 mg x1 today  Renal labs as indicated   Vancomycin concentration ordered for AM labs tomorrow  Pharmacy will continue to monitor patient and adjust therapy as indicated    Thank you for the consult,  Latesha Bustillos RPH  2/27/2025

## 2025-02-27 NOTE — ED PROVIDER NOTES
EMERGENCY DEPARTMENT HISTORY AND PHYSICAL EXAM      Date: 2/26/2025  Patient Name: Stefano Begum Jr.      History of Presenting Illness     Chief Complaint   Patient presents with    Circulatory Problem       Location/Duration/Severity/Modifying factors   Chief Complaint   Patient presents with    Circulatory Problem       HPI:  Stefano Begum Jr. is a 84 y.o. male with PMH significant for left lower extremity AKA for progressive nonsalvageable gangrene in January of this year, hypertension, hyperlipidemia, atrial fibrillation, peripheral artery disease, hypothyroidism presents from his facility after vascular surgery clinic due to concern for right limb ischemia with plan for admission for vascular intervention.  Patient able to provide any history.  Denies any pain or other complaints.    PCP: Mignon Foster PA    Current Facility-Administered Medications   Medication Dose Route Frequency Provider Last Rate Last Admin    vancomycin (VANCOCIN) 1250 mg in 250 mL IVPB  25 mg/kg IntraVENous Once Frank Gamble .7 mL/hr at 02/26/25 2225 1,250 mg at 02/26/25 2225     Current Outpatient Medications   Medication Sig Dispense Refill    lactobacillus (CULTURELLE) capsule Take 1 capsule by mouth daily (with breakfast) 60 capsule 0    Multiple Vitamin (MULTIVITAMIN) TABS tablet Take 1 tablet by mouth daily 30 tablet 0    thiamine mononitrate (THIAMINE) 100 MG tablet Take 1 tablet by mouth daily 30 tablet 0    atenolol (TENORMIN) 50 MG tablet Take 1 tablet by mouth daily      pantoprazole (PROTONIX) 40 MG tablet Take 1 tablet by mouth as needed (daily prn)      rosuvastatin (CRESTOR) 40 MG tablet Take 1 tablet by mouth every evening      traZODone (DESYREL) 50 MG tablet Take 1 tablet by mouth nightly      sertraline (ZOLOFT) 50 MG tablet Take 1 tablet by mouth daily      levothyroxine (SYNTHROID) 50 MCG tablet Take 1 tablet by mouth Daily         Past History     Past Medical History:  Past Medical History:  of triage    Repeat lactate level: improving    Reassessment Exam: I have reassessed tissue perfusion and hemodynamic status after fluid bolus at this date/time: 2/26/2025, 1130      Meds Given in ED:  Medications   vancomycin (VANCOCIN) 1250 mg in 250 mL IVPB (1,250 mg IntraVENous New Bag 2/26/25 2225)   sodium chloride 0.9 % bolus 1,551 mL (1,551 mLs IntraVENous New Bag 2/26/25 2221)   ceFEPIme (MAXIPIME) 2,000 mg in sterile water 20 mL IV syringe (2,000 mg IntraVENous Given 2/26/25 2221)       Final Diagnosis:  1. Critical limb ischemia of right lower extremity with bypass graft (HCC)    2. Acute kidney injury    3. Hyperbilirubinemia    4. Septic shock (HCC)        Disposition:  Destination: Admission    Discharge Rx:   New Prescriptions    No medications on file         Dictation disclaimer: Please note that this dictation was completed with [employer], the [employer] voice recognition software. Quite often unanticipated grammatical, syntax, homophones, and other interpretive errors are inadvertently transcribed by the computer software. Please disregard these errors. Please excuse any errors that have escaped final proofreading.     Frank Gamble D.O.  Emergency Physician   Acute [facility]             Frank Gamble,   02/28/25 6608

## 2025-02-27 NOTE — PROGRESS NOTES
New OT orders received and chart reviewed. Unable to see pt for OT evaluation at this time due to:    Pt is having RRT for hypotension this am.  Off unit for BKE on 2nd attempt 09:20    Will follow up later as pt's schedule allows. Thank you for this referral.    Roselyn Zeng MS, OTR/L

## 2025-02-27 NOTE — PROGRESS NOTES
Physical Therapy  Pt not seen for skilled PT due to:    AM RRT for hypotension. Now off unit for RLE AKA.     Thank you.  SEAN GarciaT

## 2025-02-27 NOTE — ED TRIAGE NOTES
Pt brought in by [facility] for Vascular surgery on LLE tomorrow. Pt found to be hypotensive and tachycardic. Fingers are blue along with discoloration on foot.

## 2025-02-27 NOTE — PROGRESS NOTES
0230-Received pt from ED, pt is sick looking , lethargic and oriented to self,   on RA , bp80/59 p 82 t 97.5.

## 2025-02-27 NOTE — ED NOTES
Latest Reference Range & Units 02/26/25 21:40   Lactic Acid, Plasma 0.4 - 2.0 MMOL/L 2.8 (HH)   (HH): Data is critically high    MD Gamble made aware.

## 2025-02-27 NOTE — CARE COORDINATION
Hospice consult noted.  Called pt's son Balwinder Begum [phone].  He stated he lives in [place] [facility].  He confirmed that his dad came from [facility].  He stated he is not going back there because the doctor already told him this morning that \"They didn't think he will be here with us for very long as he has hours or days to live.\"  He is ok with using [facility].  Informed him someone will be reaching out to him from Hospice.          [place] [facility]  2/27/2025, 1:01 PM    Patient Name: Stefano Begum Jr.                   YOB: 1940    Patient offered star rated [facility] of Choice for [facility].      Called [facility] and spoke with Ruby.  She stated one of the nurses will be on her way to the hospital.    Pt's clinicals uploaded to [facility] by [facility] in [facility].            Sharonda Lr, ZENAN RN  Care Management

## 2025-02-27 NOTE — PROGRESS NOTES
[facility]    I spoke w/ the pt's son Ash Begum. He indicated that he would be his father's main POA/decision-maker. I updated him regarding his father's status. It seems he was not involved when his father underwent his LLE AKA, and as such, is trying to catch up and try to make the best decisions for his father. He is consulting with his brother. I let him know of my recommendation for guillotine R BKA with formal R AKA in the future. This is necessary to save his father's life right now. Hospice care is also an option, and the this was discussed in further detail with the primary medical team physician.    I will plan to call back in approx 30-45 minutes to discuss again with the pt's son. He is amenable to this plan.     MD [facility]  Vascular Surgeon

## 2025-02-27 NOTE — PLAN OF CARE
Problem: Dyspnea Due to End of Life  Goal: Demonstrate understanding of and ability to manage respiratory symptoms at end of life  Description: Patient  and or family/caregiver will verbalize recall of breathing strategies to maintain an effective breathing pattern during the inpatient hospice stay.        Outcome: Progressing  Note: Patient comfort measures  Will continue to monitor for pain and dyspnea and medicate as ordered

## 2025-02-27 NOTE — CARE COORDINATION
02/27/25 1610   Readmission Assessment   Number of Days since last admission? 8-30 days   Previous Disposition SNF   Who is being Interviewed   (son Balwinder)   What was the patient's/caregiver's perception as to why they think they needed to return back to the hospital? Other (Comment)  (Pt came back to the hospital for amputation of right lower extremity)   Did you visit your Primary Care Physician after you left the hospital, before you returned this time? Yes  (pt saw doctor at the nursing home)   Did you see a specialist, such as Cardiac, Pulmonary, Orthopedic Physician, etc. after you left the hospital? Yes   Who advised the patient to return to the hospital? Physician   Does the patient report anything that got in the way of taking their medications? No   In our efforts to provide the best possible care to you and others like you, can you think of anything that we could have done to help you after you left the hospital the first time, so that you might not have needed to return so soon? Additional Community resources available for illness support         KENYA Martines RN  Care Management

## 2025-02-27 NOTE — SIGNIFICANT EVENT
[facility]  Rapid/Medical Response Team Note      Patient:    Stefano Begum Jr. 84 y.o. male, : 1940  Patient MRN: [id number]    Admission Date: 2025   Admission Diagnosis: Hyperbilirubinemia [E80.6]  Acute kidney injury [N17.9]  Septic shock (HCC) [A41.9, R65.21]  Critical limb ischemia of right lower extremity with bypass graft (HCC) [I70.321]  Critical limb ischemia of right lower extremity (HCC) [I70.221]      RAPID RESPONSE  Rapid response called for: Hypotension    Stefano Grijalva is 84 years old male who presents with leg pain and altered mentation.  Patient is very confused unable to provide any detailed history. He was seen for a follow-up of his left lower extremity above-knee amputation and was found to have ischemic right lower extremity and was referred for a planned amputation of above-the-knee.     OBJECTIVE    RRT/MRT start time:               RRT/MRT end time:  Vitals:    25 0823   BP: 101/74   Pulse: (!) 102   Resp: 24   Temp:    SpO2:         Physical Exam:  General:          Alert, confused, cachectic, sick looking and in pain     Lungs:             Clear to auscultation bilaterally.  No Wheezing or Rhonchi. No rales.  Chest wall:      No tenderness or deformity. No Accessory muscle use.  Heart:              Regular rate and rhythm,  no murmur, rub or gallop.  Abdomen:        Soft, non-tender. Not distended.  Bowel sounds normal. No masses  Extremities:     Has purple and cold right lower extremity with ulcers, poor perfusion     Neurologic:Alert and oriented X 1.       ASSESSMENT/PLAN AND DISPOSITION  Stefano Begum Jr. is a 84 y.o. year old male admitted for Hyperbilirubinemia [E80.6]  Acute kidney injury [N17.9]  Septic shock (HCC) [A41.9, R65.21]  Critical limb ischemia of right lower extremity with bypass graft (HCC) [I70.321]  Critical limb ischemia of right lower extremity (HCC) [I70.221]  Rapid Response Team/ Medical Response Team Called For: hypotension    In  setting of Hypotension completed the below:    Checked vitals. BP stable, fluid responsive.  Received Cefepime, Zosyn, NS fluid 1000 mL bolus,Vancomycin  BP was 76/56. Responsive to fluid. Brandi to 102/74. Temperature was 98 per rectum. Sugar is above 100.    Sepsis  Right ischemic right lower extremity  Plan:  Follow up on blood culture  Continue Abx  Vascular to plan for amputation on right lower extremity.    Disposition: CVT step down (where going/ level of care)  Patient condition: Poor      Attending Dr. Nico Asencio notified of rapid response and is in agreement with plan.     Primary team resuming care.     [facility] Senior resident Dr. Alisa Casarez present during Rapid Response.        CRYSTAL BLUE DO -PGY 1  [facility]  February 27, 2025 8:57 AM

## 2025-02-27 NOTE — PLAN OF CARE
Problem: Safety - Adult  Goal: Free from fall injury  2/27/2025 1049 by Kam Sun RN  Outcome: Progressing  Flowsheets (Taken 2/27/2025 1049)  Free From Fall Injury: Instruct family/caregiver on patient safety  Note: Safety and fall precautions continued  Call bell within reach  Patient moved closer to nursing station  Currently on comfort measures  Frequent rounding will be performed

## 2025-02-27 NOTE — PROGRESS NOTES
0726  Patient found to be lethargic, moans when moved  BP soft, asystolics in 80's, MAP above 60  Glucose 79  NS bolus administered per order  Dr. Jimenez at bedside evaluating patient  States his right leg needs to be amputated    0756   RRT called per charge request  BP's improving with bolus    0846  Patient taken to OR for amputation

## 2025-02-27 NOTE — H&P
Hospitalist Admission History and Physical    NAME:  Stefano Begum Jr.   :   1940   MRN:   [id number]     PCP:  Mignon Foster PA  Date/Time:  2025 11:37 PM  Subjective:   CHIEF COMPLAINT: leg pain and altered mentation    HISTORY OF PRESENT ILLNESS:     Stefano is a 84 y.o.   male who presents with leg pain and altered mentation.  Patient is very confused unable to provide any detailed history.  He just repeatedly states left leg pain.  He was seen for a follow-up of his left lower extremity above-knee amputation and was found to have ischemic right lower extremity and was referred for a planned amputation of above-the-knee.        Past Medical History:   Diagnosis Date    BPH (benign prostatic hyperplasia) 2024    Chronic CHF (congestive heart failure) (HCC) 2024    Depression     History of atrial fibrillation 2024    Hyperlipidemia     Hypertension     Hypothyroidism 2024    Peripheral arterial disease 2024    Peripheral arterial disease         Past Surgical History:   Procedure Laterality Date    FOOT AMPUTATION THROUGH METATARSAL Left     Left foot all toes amputated    LEG SURGERY Left 2025    LEFT TROCHANTERIC FEMORAL NAIL HARDWARE REMOVAL; C-ARM; [DEPUY SYNTHES ORTHO] performed by Ronald Villarreal MD at [facility] MAIN OR    LEG SURGERY Left 2025    LEFT LEG ABOVE KNEE AMPUTATION performed by Larry Miller MD at [facility] MAIN OR       Social History     Tobacco Use    Smoking status: Never    Smokeless tobacco: Never   Substance Use Topics    Alcohol use: Not Currently        No family history on file.     No Known Allergies     Prior to Admission medications    Medication Sig Start Date End Date Taking? Authorizing Provider   lactobacillus (CULTURELLE) capsule Take 1 capsule by mouth daily (with breakfast) 25   Robb Marcelo MD   Multiple Vitamin (MULTIVITAMIN) TABS tablet Take 1 tablet by mouth daily 25   Robb Marcelo MD      RBC Comment ANISOCYTOSIS  1+        RBC Comment POIKILOCYTOSIS  2+        RBC Comment MARANDA CELLS  2+        RBC Comment OVALOCYTES  1+       APTT    Collection Time: 02/26/25 10:02 PM   Result Value Ref Range    APTT 28.6 23.0 - 36.4 SEC   D-Dimer, Quantitative    Collection Time: 02/26/25 10:02 PM   Result Value Ref Range    D-Dimer, Quant 2.19 (H) <0.46 ug/ml(FEU)   Fibrinogen    Collection Time: 02/26/25 10:02 PM   Result Value Ref Range    Fibrinogen 148 (L) 210 - 451 mg/dL   Protime-INR    Collection Time: 02/26/25 10:02 PM   Result Value Ref Range    Protime 20.2 (H) 11.9 - 14.9 sec    INR 1.7 (H) 0.9 - 1.1         Recent Labs     02/26/25  2202   WBC 22.6*   HGB 13.7   HCT 45.2   *     Recent Labs     02/26/25  2140 02/26/25  2202     --    K 5.1  --    *  --    CO2 21  --    BUN 50*  --    ALT 29  --    INR  --  1.7*          Procedures/imaging: see electronic medical records for all procedures/Xrays and details which were not copied into this note but were reviewed prior to creation of Plan    Assessment/Plan:      Principal Problem:    Critical limb ischemia of right lower extremity (HCC)  Resolved Problems:    * No resolved hospital problems. *    # Critical ischemia of the right lower extremity  -Vascular planning to do right above-knee amputation  Vascular service will see in the morning  Pain control      # Severe sepsis of right lower extremity  with encephalopathy  -Hypotension improved with IV fluid  Blood culture obtained  Lactic acid trended down with bolus of fluid  Will empirically treat with zosyn and  vancomycin  R AKA  planned for source control      # Acute kidney injury  Likely secondary to above  Creatinine of 2.4 from 0.8 a few days ago  Strict input and output  Avoid nephrotoxins  Renally adjust medications  Reassessed after additional IV fluid      # Severe Protein-calorie malnutrition  Consult dietitian    # History of EVAR  -Follow-up with

## 2025-02-27 NOTE — ACP (ADVANCE CARE PLANNING)
Advance Care Planning     Palliative Team Advance Care Planning (ACP) Conversation    Date of Conversation: 02/27/25       ACP documents on file prior to discussion:  -Portable DNR    Healthcare Decision Maker:    Primary Decision Maker: Dinorah Begum - Spouse - [phone]     Conversation Summary:    Visited patient for new consult along with Palliative team member IRA Olivera NP. Patient is lying in bed, eyes closed. Respirations are shallow, oxygen in use. Did not respond to verbal or tactile stimuli. Patient is known to our team, last visit 12/24/24. During that visit patient had made it very clear that he did not want to be resuscitated, DDNR was completed and scanned into the EMR. Despite this DDNR, patient is listed as a \"full code\".  Noted events of this morning.     Patient does not have an Advanced Medical Directive on file, he is not able to complete one at this time. He is , have four children.     Attempted to call patient's wife Dinorah at [phone] who is the MPOA. Unable to reach her.    Call placed to patient's son Jenaro at [phone]. Son states that he and his brother Ash have talked with the doctors and are aware of this morning's events. States that they have sister in [place], a sister in [place] who \"doesn't care\" and \"does not want to be involved\". His younger brother Ash is in [place]. He shared that his brother Ash had a conversation with their father prior to this admission, and the patient told him that he would not want to have any further amputations.   Jenaro added that he does talk with his mother every day, noticed that her confusion has increased. He also talks with his sister in [place] regularly. They agree that they just want their father to be \"comfortable\", in agreement with the DDNR that was completed in December. Code status changed to \"DNR\". They are also in agreement with a hospice consult.   Jenaro explained that he doesn't want to tell his mother any  \"bad\" news over the phone. He believes his brother will drive up from [place] to talk with her.  Son was very appreciative of our call, wants to be the \"point of contact\" for the family at this time.  Bedside RN and Dr. Asencio made aware of change in code status.   Comfort measures to continue.      Palliative team remains available to provide support to Mr Begum and his family during this difficult time.    Resuscitation Status:   Code Status: DNR     Documentation Completed:  -No new documents completed.    Tawanna Araujo RN  Palliative Medicine Inpatient RN  Palliative COPE Line: [phone]

## 2025-02-27 NOTE — PROGRESS NOTES
[facility] [place]   Pharmacy Pharmacokinetic Monitoring Service - Vancomycin    Indication: Skin and Soft Tissue Infection  Target Concentration: Goal trough of 10-15 mg/L and AUC/ANTHONY <500 mg*hr/L  Day of Therapy: 1  Additional Antimicrobials: Piperacillin/Tazobactam    Pertinent Laboratory Values:   Temp: 98.4 °F (36.9 °C)  Recent Labs     02/26/25  2140 02/26/25  2202   CREATININE 2.40*  --    BUN 50*  --    WBC  --  22.6*   PROCAL 0.87  --        Estimated Creatinine Clearance: 16 mL/min (A) (based on SCr of 2.4 mg/dL (H)).    Pertinent Cultures:  Culture Date Source Results   2/21 Blood and urine No growth to date       Assessment:  Date/Time Current Dose Concentration Timing of Concentration (h) AUC   2/26 1250mg x1 N/a N/a N/a     Note: Serum concentrations collected for AUC dosing may appear elevated if collected in close proximity to the dose administered, this is not necessarily an indication of toxicity    Patient's renal function is abnormally low. Will dose by levels until renal function stabilizes.    Plan:  Concentration-guided dosing due to renal impairment  Vancomycin 1250mg(~24mg/kg) loading dose given 2/26@2225  Renal labs as indicated   Vancomycin concentration ordered for  2/27 @ 0600  Pharmacy will continue to monitor patient and adjust therapy as indicated    Thank you for the consult,  Alisa Simons RPH  2/27/2025

## 2025-02-28 PROCEDURE — 2500000003 HC RX 250 WO HCPCS: Performed by: STUDENT IN AN ORGANIZED HEALTH CARE EDUCATION/TRAINING PROGRAM

## 2025-02-28 PROCEDURE — 2140000001 HC CVICU INTERMEDIATE R&B

## 2025-02-28 PROCEDURE — 2700000000 HC OXYGEN THERAPY PER DAY

## 2025-02-28 PROCEDURE — 94761 N-INVAS EAR/PLS OXIMETRY MLT: CPT

## 2025-02-28 PROCEDURE — 99232 SBSQ HOSP IP/OBS MODERATE 35: CPT | Performed by: INTERNAL MEDICINE

## 2025-02-28 PROCEDURE — 6370000000 HC RX 637 (ALT 250 FOR IP): Performed by: INTERNAL MEDICINE

## 2025-02-28 RX ORDER — MORPHINE SULFATE 20 MG/ML
5-20 SOLUTION ORAL
Qty: 30 ML | Refills: 0 | Status: SHIPPED | OUTPATIENT
Start: 2025-02-28 | End: 2025-03-03

## 2025-02-28 RX ORDER — LORAZEPAM 1 MG/1
1 TABLET ORAL
Qty: 30 TABLET | Refills: 0 | Status: SHIPPED | OUTPATIENT
Start: 2025-02-28 | End: 2025-03-04

## 2025-02-28 RX ORDER — DOCUSATE SODIUM 100 MG/1
100 CAPSULE, LIQUID FILLED ORAL 2 TIMES DAILY
Qty: 60 CAPSULE | Refills: 0 | Status: SHIPPED | OUTPATIENT
Start: 2025-02-28 | End: 2025-03-04

## 2025-02-28 RX ORDER — HYOSCYAMINE SULFATE 0.12 MG/1
0.12 TABLET ORAL EVERY 4 HOURS PRN
Qty: 15 TABLET | Refills: 0 | Status: SHIPPED | OUTPATIENT
Start: 2025-02-28 | End: 2025-03-04

## 2025-02-28 RX ORDER — ACETAMINOPHEN 650 MG/1
650 SUPPOSITORY RECTAL EVERY 4 HOURS PRN
Qty: 10 SUPPOSITORY | Refills: 0 | Status: SHIPPED | OUTPATIENT
Start: 2025-02-28 | End: 2025-03-04

## 2025-02-28 RX ADMIN — SODIUM CHLORIDE, PRESERVATIVE FREE 10 ML: 5 INJECTION INTRAVENOUS at 20:21

## 2025-02-28 RX ADMIN — SODIUM CHLORIDE, PRESERVATIVE FREE 10 ML: 5 INJECTION INTRAVENOUS at 09:08

## 2025-02-28 RX ADMIN — MORPHINE SULFATE 10 MG: 100 SOLUTION ORAL at 09:11

## 2025-02-28 RX ADMIN — LORAZEPAM 1 MG: 2 LIQUID ORAL at 04:54

## 2025-02-28 RX ADMIN — MORPHINE SULFATE 10 MG: 100 SOLUTION ORAL at 15:43

## 2025-02-28 NOTE — PALLIATIVE CARE
Palliative Medicine    Palliative team rounded on comfort patient. He is lying in bed, resting quietly. Opened eyes to name. Respirations are shallow, non labored with oxygen in use.  Will discontinue tele.      Palliative team remains available to provide support to Mr Begum and his family during this hospital stay.    CODE STATUS: DNR/DNI, COMFORT MEASURES    Tawanna Araujo RN  Palliative Medicine Inpatient RN  Palliative COPE Line: [phone]

## 2025-02-28 NOTE — PROGRESS NOTES
[facility]  Progress Note    Patient: Stefano Begum Jr. Age: 84 y.o. : 1940 MR#: [id number] SSN: [id number]  Date/Time: 2025     Subjective:   Patient on comfort care currently. Does not appear to be in distress. 1 dose of ativan received overnight. Hospice to see patient.    Dispo: hospice  DC 2025    Review of systems  General: No fevers or chills.  Cardiovascular: No chest pain or pressure. No palpitations.   Pulmonary: No shortness of breath, cough or wheeze.   Gastrointestinal: No abdominal pain, nausea, vomiting or diarrhea.   Genitourinary: No urinary frequency, urgency, hesitancy or dysuria.   Musculoskeletal: No joint or muscle pain, no back pain, no recent trauma.    Neurologic: No headache, numbness, tingling or weakness.   Assessment/Plan:   Critical limb ischemia of the right lower extremity  Severe sepsis of the right lower extremity  Acute kidney injury  Severe protein calorie malnutrition  History of EVAR     Admitted to CVT stepdown  Stable to transfer to a different unit now that patient is comfort care  Continue comfort measures  Morphine and Ativan as needed ordered  Discussed with family and they are in agreement with DNR and comfort care measure status.  Discontinue antibiotics  No procedures planned  Hospice consult placed  Discontinue labs      I spent 40 minutes with the patient in face-to-face consultation, of which greater than 50% was spent in counseling and coordination of care as described above.     Case discussed with:  [x]Patient  [x]Family  []Nursing  []Case Management    Objective:   VS: BP (!) 80/46   Pulse (!) 102   Temp 98.3 °F (36.8 °C) (Rectal)   Resp 22   Ht 1.778 m (5' 10\")   SpO2 94%   BMI 16.36 kg/m²    Tmax/24hrs: Temp (24hrs), Av.7 °F (36.5 °C), Min:97 °F (36.1 °C), Max:98.3 °F (36.8 °C)  IOBRIEFNo intake or output data in the 24 hours ending 25 0855    General:  Alert, cooperative, no acute  distress    HEENT: PERRLA, anicteric sclerae.  Pulmonary:  CTA Bilaterally. No Wheezing/Rales.  Cardiovascular: Regular rate and Rhythm.  GI:  Soft, Non distended, Non tender. + Bowel sounds.  Extremities:  No edema. No calf tenderness.  Right lower extremity discolored and cool to touch  Psych: Good insight. Not anxious or agitated.  Neurologic: Alert and oriented X 1. Moves all ext.  Additional:    Medications:   Current Facility-Administered Medications   Medication Dose Route Frequency    sodium chloride flush 0.9 % injection 5-40 mL  5-40 mL IntraVENous 2 times per day    sodium chloride flush 0.9 % injection 5-40 mL  5-40 mL IntraVENous PRN    ondansetron (ZOFRAN) injection 4 mg  4 mg IntraVENous Q6H PRN    acetaminophen (TYLENOL) suppository 650 mg  650 mg Rectal Q6H PRN    sodium chloride flush 0.9 % injection 5-40 mL  5-40 mL IntraVENous PRN    loperamide (IMODIUM) capsule 2 mg  2 mg Oral PRN    glycopyrrolate (ROBINUL) injection 0.2 mg  0.2 mg IntraVENous Q4H PRN    morphine 20MG/ML concentrated solution 10 mg  10 mg Oral Q2H PRN    LORazepam (ATIVAN) 2 MG/ML concentrated solution 1 mg  1 mg Oral Q2H PRN       Labs:    Recent Results (from the past 24 hour(s))   POCT Glucose    Collection Time: 02/27/25  9:31 AM   Result Value Ref Range    POC Glucose 77 70 - 110 mg/dL       Signed By: Nico Asencio DO     February 28, 2025      Disclaimer: Sections of this note are dictated using utilizing voice recognition software.  Minor typographical errors may be present. If questions arise, please do not hesitate to contact me or call our department.

## 2025-02-28 NOTE — NURSE NAVIGATOR
HF Navigator attempted education this day; however, pt unable to be seen due to:    [ x ] lethargy/confusion-disoriented X 4  [  ] off floor for testing   [  ] RN care  [  ] Pain   [  ] Other    Will follow up next day as indicated.    Sis Carmona, OSVALDO  2/28/2025

## 2025-02-28 NOTE — PLAN OF CARE
Problem: Safety - Adult  Goal: Free from fall injury  Outcome: Progressing  Flowsheets (Taken 2/27/2025 1049 by Kam Sun RN)  Free From Fall Injury: Instruct family/caregiver on patient safety  Note: Patient to remain free of any falls or injuries during length of stay. Safety interventions implemented: non-skid yellow socks on, bed alarm on, and call light within reach.      Problem: Discharge Planning  Goal: Discharge to home or other facility with appropriate resources  Outcome: Progressing  Flowsheets (Taken 2/28/2025 0229)  Discharge to home or other facility with appropriate resources: Identify discharge learning needs (meds, wound care, etc)  Note: Patient currently on comfort measures.    Problem: Dyspnea Due to End of Life  Goal: Demonstrate understanding of and ability to manage respiratory symptoms at end of life  Description: Patient  and or family/caregiver will verbalize recall of breathing strategies to maintain an effective breathing pattern during the inpatient hospice stay.  Outcome: Progressing  Note: Patient on comfort measures with PRN's available for distress and agitation. Patient appears comfortable and scores a 0 on nonverbal pain scale. Patient to be reassessed for need of medications.

## 2025-02-28 NOTE — CARE COORDINATION
Called Ligia of [facility] by [facility].  No answer and no options to leave a message..    Called  [facility] and spoke with Ruby.  She stated Ida is in the hospital and she will send her a message for her to access pt.      [facility] [place] [facility]    Called pt's son Balwinder Begum [phone] informing him that per doctor and nurses, pt is not imminent.   He now agrees for pt to be discharged back to [facility].        Called [facility] and spoke with Livier in Admissions [phone].  She stated they can accept pt back but they will need contract with [facility] before pt can come.  She stated comfort meds can be filled in the hospital and come with pt or they can have the prescriptions.    1325:    Ruby of [facility] called back stating CM can call Ida at [phone].    Called Ida of [facility].  She stated pt was evaluated for GIP yesterday evening but he did not meet criteria.  Informed her pt will be returning to [facility].  She stated they will do contract with [place] and she will call pt's son Balwinder.    Updated Dr. Asencio.      Sharonda Lr, BSN RN  Care Management

## 2025-02-28 NOTE — CARE COORDINATION
Call placed to Ligia Rodriguez of [facility] by [facility] [phone].  She stated she contacted pt's son Balwinder yesterday and he told her that pt will remain in the hospital because he is imminent.  Ligia stated she will send one of the nurses to assess pt for GIP.          Sharonda Lr, ZENAN RN  Care Management

## 2025-03-01 PROCEDURE — 99232 SBSQ HOSP IP/OBS MODERATE 35: CPT | Performed by: INTERNAL MEDICINE

## 2025-03-01 PROCEDURE — 6370000000 HC RX 637 (ALT 250 FOR IP): Performed by: INTERNAL MEDICINE

## 2025-03-01 PROCEDURE — 2700000000 HC OXYGEN THERAPY PER DAY

## 2025-03-01 PROCEDURE — 2140000001 HC CVICU INTERMEDIATE R&B

## 2025-03-01 PROCEDURE — 94761 N-INVAS EAR/PLS OXIMETRY MLT: CPT

## 2025-03-01 PROCEDURE — 2500000003 HC RX 250 WO HCPCS: Performed by: STUDENT IN AN ORGANIZED HEALTH CARE EDUCATION/TRAINING PROGRAM

## 2025-03-01 RX ADMIN — SODIUM CHLORIDE, PRESERVATIVE FREE 10 ML: 5 INJECTION INTRAVENOUS at 07:34

## 2025-03-01 RX ADMIN — MORPHINE SULFATE 10 MG: 100 SOLUTION ORAL at 04:37

## 2025-03-01 RX ADMIN — MORPHINE SULFATE 10 MG: 100 SOLUTION ORAL at 20:13

## 2025-03-01 RX ADMIN — LORAZEPAM 1 MG: 2 LIQUID ORAL at 22:35

## 2025-03-01 NOTE — PLAN OF CARE
Problem: Safety - Adult  Goal: Free from fall injury  Outcome: Progressing     Problem: Chronic Conditions and Co-morbidities  Goal: Patient's chronic conditions and co-morbidity symptoms are monitored and maintained or improved  Outcome: Progressing     Problem: Discharge Planning  Goal: Discharge to home or other facility with appropriate resources  Outcome: Progressing  Flowsheets (Taken 2/28/2025 2140)  Discharge to home or other facility with appropriate resources:   Identify barriers to discharge with patient and caregiver   Arrange for needed discharge resources and transportation as appropriate   Refer to discharge planning if patient needs post-hospital services based on physician order or complex needs related to functional status, cognitive ability or social support system   Identify discharge learning needs (meds, wound care, etc)     Problem: Skin/Tissue Integrity  Goal: Skin integrity remains intact  Description: 1.  Monitor for areas of redness and/or skin breakdown  2.  Assess vascular access sites hourly  3.  Every 4-6 hours minimum:  Change oxygen saturation probe site  4.  Every 4-6 hours:  If on nasal continuous positive airway pressure, respiratory therapy assess nares and determine need for appliance change or resting period  Outcome: Progressing  Flowsheets (Taken 2/28/2025 1944)  Skin Integrity Remains Intact:   Monitor for areas of redness and/or skin breakdown   Assess vascular access sites hourly     Problem: Dyspnea Due to End of Life  Goal: Demonstrate understanding of and ability to manage respiratory symptoms at end of life  Description: Patient  and or family/caregiver will verbalize recall of breathing strategies to maintain an effective breathing pattern during the inpatient hospice stay.        Outcome: Progressing     Problem: Nutrition Deficit:  Goal: Optimize nutritional status  Outcome: Progressing     Problem: Pain  Goal: Verbalizes/displays adequate comfort level or  baseline comfort level  Outcome: Progressing  Flowsheets (Taken 2/28/2025 2140)  Verbalizes/displays adequate comfort level or baseline comfort level:   Implement non-pharmacological measures as appropriate and evaluate response   Encourage patient to monitor pain and request assistance   Assess pain using appropriate pain scale   Consider cultural and social influences on pain and pain management   Administer analgesics based on type and severity of pain and evaluate response

## 2025-03-01 NOTE — CARE COORDINATION
[facility]  hospice nurse called SW to get update regarding patient discharge plan with hospice.      SW call [facility] [phone]  for and update, and was informed that the facility has not yet received  [facility] contract.    Per Niseren patient can return back to [facility], however [facility] will not be able to assist pt until the facility has the contract.    JARED updated [facility] hosp[facility] nurse with update from Nisreen.      Ashli Ames BSW  Case Management

## 2025-03-01 NOTE — PROGRESS NOTES
[facility] Hospitalist Group  Progress Note    Patient: Stefano Begum Jr. Age: 84 y.o. : 1940 MR#: [id number] SSN: [id number]  Date/Time: 3/1/2025     Subjective:   Patient continues on comfort care.  Appears in no acute distress.    Dispo: hospice  DC 2025    Review of systems  General: No fevers or chills.  Cardiovascular: No chest pain or pressure. No palpitations.   Pulmonary: No shortness of breath, cough or wheeze.   Gastrointestinal: No abdominal pain, nausea, vomiting or diarrhea.   Genitourinary: No urinary frequency, urgency, hesitancy or dysuria.   Musculoskeletal: No joint or muscle pain, no back pain, no recent trauma.    Neurologic: No headache, numbness, tingling or weakness.   Assessment/Plan:   Critical limb ischemia of the right lower extremity  Severe sepsis of the right lower extremity  Acute kidney injury  Severe protein calorie malnutrition  History of EVAR     Admitted to CVT stepdown  Stable to transfer to a different unit now that patient is comfort care  Continue comfort measures  Morphine and Ativan as needed ordered  Per family they do not want amputation of right lower extremity given patient would have poor quality of life.  Discussed with family and they are in agreement with DNR and comfort care measure status.  Discontinue antibiotics  No procedures planned  Hospice consult placed  Discontinue labs      I spent 40 minutes with the patient in face-to-face consultation, of which greater than 50% was spent in counseling and coordination of care as described above.     Case discussed with:  [x]Patient  [x]Family  []Nursing  []Case Management    Objective:   VS: BP (!) 89/63   Pulse (!) 108   Temp 98.1 °F (36.7 °C) (Axillary)   Resp 14   Ht 1.778 m (5' 10\")   SpO2 97%   BMI 16.36 kg/m²    Tmax/24hrs: Temp (24hrs), Av °F (36.7 °C), Min:97.8 °F (36.6 °C), Max:98.1 °F (36.7 °C)  IOBRIEFNo intake or output data in the 24 hours ending 25  1142    General: Not alert, resting comfortably  HEENT: PERRLA, anicteric sclerae.  Pulmonary:  CTA Bilaterally. No Wheezing/Rales.  Cardiovascular: Regular rate and Rhythm.  GI:  Soft, Non distended, Non tender. + Bowel sounds.  Extremities:  No edema. No calf tenderness.  Right lower extremity discolored and cool to touch  Neurologic: Alert and oriented X 0. Moves all ext.  Additional:    Medications:   Current Facility-Administered Medications   Medication Dose Route Frequency    sodium chloride flush 0.9 % injection 5-40 mL  5-40 mL IntraVENous 2 times per day    sodium chloride flush 0.9 % injection 5-40 mL  5-40 mL IntraVENous PRN    ondansetron (ZOFRAN) injection 4 mg  4 mg IntraVENous Q6H PRN    acetaminophen (TYLENOL) suppository 650 mg  650 mg Rectal Q6H PRN    sodium chloride flush 0.9 % injection 5-40 mL  5-40 mL IntraVENous PRN    loperamide (IMODIUM) capsule 2 mg  2 mg Oral PRN    glycopyrrolate (ROBINUL) injection 0.2 mg  0.2 mg IntraVENous Q4H PRN    morphine 20MG/ML concentrated solution 10 mg  10 mg Oral Q2H PRN    LORazepam (ATIVAN) 2 MG/ML concentrated solution 1 mg  1 mg Oral Q2H PRN       Labs:    No results found for this or any previous visit (from the past 24 hour(s)).    Signed By: Nico Asencio DO     March 1, 2025      Disclaimer: Sections of this note are dictated using utilizing voice recognition software.  Minor typographical errors may be present. If questions arise, please do not hesitate to contact me or call our department.

## 2025-03-01 NOTE — PROGRESS NOTES
Bedside shift change report given to OSVALDO Worley (oncoming nurse) by OSVALDO Vargas (offgoing nurse). Report included the following information Nurse Handoff Report, Cardiac Rhythm AFIB, and Alarm Parameters.      Bedside shift change report given to OSVALDO Vargas (oncoming nurse) by OSVALDO Worley (offgoing nurse). Report included the following information Nurse Handoff Report, Cardiac Rhythm AFIB, and Alarm Parameters.

## 2025-03-01 NOTE — CARE COORDINATION
SW was asked to come and speak with family friend  (Franko Bliss) at pt bedside, he want to discuss patient discharge plan.    JARED informed nurse   2 CVT  that she will only be able to speak with Primary Decision  Maker or next of kin lists regarding pt discharge.    JARED called pt yasmeen Aleman and  Balwinder Begum call went to voicemail, SW left message.    SW went to 2 CVT  was informed by  nurse that son Ash was on the phone with her and informed her that patient spouse that .    Franko was at the nurse station trying to informed regarding pt burial and that the patient had [employer] health insurance.    SW informed that only can speak with next of kin or the PDM.    Nurse  informed yasmeen Aleman that the writer was at nurse station desk and would like to speak with him.    JARED called Ash call went to voicemail.    Ashli SABILLON  Case Management

## 2025-03-02 PROCEDURE — 2140000001 HC CVICU INTERMEDIATE R&B

## 2025-03-02 PROCEDURE — 2500000003 HC RX 250 WO HCPCS: Performed by: STUDENT IN AN ORGANIZED HEALTH CARE EDUCATION/TRAINING PROGRAM

## 2025-03-02 PROCEDURE — 2700000000 HC OXYGEN THERAPY PER DAY

## 2025-03-02 PROCEDURE — 99231 SBSQ HOSP IP/OBS SF/LOW 25: CPT | Performed by: HOSPITALIST

## 2025-03-02 PROCEDURE — 94761 N-INVAS EAR/PLS OXIMETRY MLT: CPT

## 2025-03-02 RX ADMIN — SODIUM CHLORIDE, PRESERVATIVE FREE 10 ML: 5 INJECTION INTRAVENOUS at 08:00

## 2025-03-02 RX ADMIN — SODIUM CHLORIDE, PRESERVATIVE FREE 10 ML: 5 INJECTION INTRAVENOUS at 21:00

## 2025-03-02 NOTE — CARE COORDINATION
SW call [facility] [phone] for and update, and was informed that the facility has not yet received [facility] contract.       Ashli Ames BSW  Case Management

## 2025-03-02 NOTE — PROGRESS NOTES
[facility] Hospitalist Group  Progress Note    Patient: Stefano Begum Jr. Age: 84 y.o. : 1940 MR#: [id number] SSN: [id number]  Date/Time: 3/2/2025     Subjective:   Patient continues on comfort care.  Appears in no acute distress.    Dispo: hospice  DC 2025      Assessment/Plan:   Critical limb ischemia of the right lower extremity  Severe sepsis of the right lower extremity  Acute kidney injury  Severe protein calorie malnutrition  History of EVAR     Admitted to CVT stepdown  Stable to transfer to a different unit now that patient is comfort care  Continue comfort measures  Morphine and Ativan as needed ordered  Per family they do not want amputation of right lower extremity given patient would have poor quality of life.  Discussed with family and they are in agreement with DNR and comfort care measure status.  Discontinue antibiotics  No procedures planned  Hospice consult placed  Discontinue labs      I spent 25 minutes with the patient in face-to-face consultation, of which greater than 50% was spent in counseling and coordination of care as described above.     Case discussed with:  [x]Patient  [x]Family  []Nursing  []Case Management    Objective:   VS: BP (!) 74/57   Pulse (!) 106   Temp 97.4 °F (36.3 °C) (Temporal)   Resp 20   Ht 1.778 m (5' 10\")   SpO2 97%   BMI 16.36 kg/m²    Tmax/24hrs: Temp (24hrs), Av.4 °F (36.3 °C), Min:97.4 °F (36.3 °C), Max:97.4 °F (36.3 °C)  IOBRIEF  Intake/Output Summary (Last 24 hours) at 3/2/2025 1240  Last data filed at 3/1/2025 1806  Gross per 24 hour   Intake --   Output 100 ml   Net -100 ml       General: Not alert, resting comfortably  HEENT: PERRLA, anicteric sclerae.  Pulmonary:  CTA Bilaterally. No Wheezing/Rales.  Cardiovascular: Regular rate and Rhythm.  GI:  Soft, Non distended, Non tender. + Bowel sounds.  Extremities:  No edema. No calf tenderness.  Right lower extremity discolored and cool to touch  Neurologic: Alert

## 2025-03-03 VITALS
RESPIRATION RATE: 25 BRPM | BODY MASS INDEX: 16.36 KG/M2 | SYSTOLIC BLOOD PRESSURE: 78 MMHG | HEART RATE: 144 BPM | DIASTOLIC BLOOD PRESSURE: 53 MMHG | TEMPERATURE: 102.2 F | HEIGHT: 70 IN | OXYGEN SATURATION: 96 %

## 2025-03-03 PROCEDURE — 2700000000 HC OXYGEN THERAPY PER DAY

## 2025-03-03 PROCEDURE — 6370000000 HC RX 637 (ALT 250 FOR IP): Performed by: INTERNAL MEDICINE

## 2025-03-03 PROCEDURE — 99239 HOSP IP/OBS DSCHRG MGMT >30: CPT | Performed by: HOSPITALIST

## 2025-03-03 PROCEDURE — 94761 N-INVAS EAR/PLS OXIMETRY MLT: CPT

## 2025-03-03 RX ADMIN — MORPHINE SULFATE 10 MG: 100 SOLUTION ORAL at 08:13

## 2025-03-03 ASSESSMENT — PAIN SCALES - GENERAL: PAINLEVEL_OUTOF10: 5

## 2025-03-03 ASSESSMENT — PAIN DESCRIPTION - LOCATION: LOCATION: GENERALIZED

## 2025-03-03 NOTE — DISCHARGE SUMMARY
Death Summary    Patient: Stefano Begum Jr. MRN: [id number]  [facility]: [id number]    YOB: 1940  Age: 84 y.o.  Sex: male    DOA: 2/26/2025 LOS:  LOS: 5 days   Discharge Date:      Admission Diagnoses: Hyperbilirubinemia [E80.6]  Acute kidney injury [N17.9]  Septic shock (HCC) [A41.9, R65.21]  Critical limb ischemia of right lower extremity with bypass graft (HCC) [I70.321]  Critical limb ischemia of right lower extremity (HCC) [I70.221]    Discharge Diagnoses:    Critical limb ischemia of the right lower extremity  Severe sepsis secondary to ischemia of the right lower extremity  Acute kidney injury  Severe protein calorie malnutrition  History of EVAR    Discharge Condition: Passed away    Consults: Vascular Surgery                                HPI:  Stefano is a 84 y.o.   male who presents with leg pain and altered mentation.  Patient is very confused unable to provide any detailed history.  He just repeatedly states left leg pain.  He was seen for a follow-up of his left lower extremity above-knee amputation and was found to have ischemic right lower extremity and was referred for a planned amputation of above-the-knee.           Hospital Course:     Patient sent from the vascular surgery office secondary to critical ischemia of the right lower extremity.  Patient has a history of left AKA.  Vascular surgery spoke with the patient sounds who wished to proceed forward with comfort measures.  Hospice consulted, patient transition to comfort measures and subsequently passed away.    Imaging:  No results found.       Procedures:   None          Minutes spent on death summary: 38 minutes spent coordinating this discharge (review instructions/follow-up, prescriptions, preparing report for sign off)    Robb Marcelo MD  3/3/2025 1:52 PM

## 2025-03-03 NOTE — PLAN OF CARE
Problem: Safety - Adult  Goal: Free from fall injury  3/3/2025 0711 by Miladis Menjivar RN  Outcome: Progressing  3/3/2025 0709 by Miladis Menjivar RN  Outcome: Progressing     Problem: Dyspnea Due to End of Life  Goal: Demonstrate understanding of and ability to manage respiratory symptoms at end of life  Description: Patient  and or family/caregiver will verbalize recall of breathing strategies to maintain an effective breathing pattern during the inpatient hospice stay.        3/3/2025 0711 by Miladis Menjivar RN  Outcome: Progressing  3/3/2025 0709 by Miladis Menjivar RN  Outcome: Progressing     Problem: Pain  Goal: Verbalizes/displays adequate comfort level or baseline comfort level  3/3/2025 0711 by Miladis Menjivar RN  Outcome: Progressing  3/3/2025 0709 by Miladis Menjivar RN  Outcome: Progressing

## 2025-03-03 NOTE — PROGRESS NOTES
Bedside and Verbal shift change report given by MATTHEW Weir RN (offgoing nurse). Report included the following information Nurse Handoff Report, Index, Intake/Output, and Recent Results.

## 2025-03-03 NOTE — NURSE NAVIGATOR
HF Navigator attempted education this day; however, pt unable to be seen due to:    [  ] lethargy/confusion  [  ] off floor for testing   [  ] RN care  [  ] Pain   [x  ] Other-comfort car    Will follow up next day as indicated.    Sis Carmona RN  3/3/2025

## 2025-03-03 NOTE — CARE COORDINATION
Per Nurse Dinesh, pt just passed.  Sent message for transport to be cancelled.  Sent message to [facility].        Sharonda Lr, ZENAN RN  Care Management

## 2025-03-03 NOTE — CARE COORDINATION
Met with [facility].  She stated they already sent contract to [facility].  She will call  with updates.  She stated CM can go ahead and arrange transport for 3pm today.            Sharonda Lr, ZENAN RN  Care Management

## 2025-03-03 NOTE — CARE COORDINATION
Requested Case Management specialist to assist with transportation to [facility].  Address is [address] [place] [address] and phone number is [phone]  Any steps at the residence? No  Patient will require BLS transport.   Pt requires Stretcher If stretcher, reason: critical limb ischemia of right lower extremity, severe sepsis of right lower extremity, left below knee amputation, comfort care  Patient is currently requiring oxygen Yes 2L  Height:5'10\"   Weight: 114 Ib  Pt is on isolation: Yes Isolation is for: Enteric contact  Is the pt ready now? No  Requested time: 3pm  PCS Faxed: No  Insurance verified on face sheet: Yes  Auth needed for transport: No  CM completed PCS/ Envelope and placed on chart.           Sharonad Lr, ZENAN RN  Care Management

## 2025-03-12 NOTE — PROGRESS NOTES
Physician Progress Note      PATIENT:               FUAD VIDALES  CSN #:                  [id number]  :                       1940  ADMIT DATE:       2025 9:23 PM  DISCH DATE:        3/3/2025 2:20 PM  RESPONDING  PROVIDER #:        Robb Marcelo MD          QUERY TEXT:    Patient admitted with Severe sepsis w/ Septic shock due to progressive RLE   ischemia with an occluded tibial bypass .  Patient noted to be Unresponsive to   touch or voice, obtunded, eyes closed ,grimaces to pain per Flow sheets and   MD PNs ,GCS 8--7.  Please document in progress notes and discharge summary if   you are treating and/or evaluating any of the following:  The medical record reflects the following:    Risk Factors: Patient continues on comfort care.  Appears in no acute   distress. In H+P MD PN -General: Alert, confused, cachectic, sick looking and   in pain    Clinical Indicators:-Palliative Care-He was lying on right side,   unresponsive to touch or voice, obtunded, on NC oxygen, looked imminent, SBPs   in 80's, clubbing on fingers and discoloration, pale and swollen RLE, pale.    3/1-MD PN Neurologic: Alert and oriented X 0. Moves all ext. GCS3/1->3/3    8,7,7,7  3/2-MD Pn--Critical limb ischemia of the right lower extremity  Severe sepsis of the right lower extremity  Acute kidney injury  Severe protein calorie malnutrition  History of EVAR    Admitted to CVT stepdown  Stable to transfer to a different unit now that patient is comfort care  Continue comfort measures  Morphine and Ativan as needed ordered  Per family they do not want amputation of right lower extremity given patient   would have poor quality of life.  Discussed with family and they are in agreement with DNR and comfort care   measure status.      Treatment: on comfort measures, Hospice consult placed, Palliative Care  Options provided:  -- Coma due to GCS 8-7 ,Neurologic: Alert and oriented X 0  -- Somnolence due to Non responsive and  Alert and

## (undated) DEVICE — BANDAGE COMPR EXSANGUATION SGL LAYERED NO CLSR 9FT LEN 4IN W

## (undated) DEVICE — GAUZE,SPONGE,4"X4",16PLY,STRL,LF,10/TRAY: Brand: MEDLINE

## (undated) DEVICE — SOLUTION IRRIG 1000ML H2O STRL BLT

## (undated) DEVICE — 450 ML BOTTLE OF 0.05% CHLORHEXIDINE GLUCONATE IN 99.95% STERILE WATER FOR IRRIGATION, USP AND APPLICATOR.: Brand: IRRISEPT ANTIMICROBIAL WOUND LAVAGE

## (undated) DEVICE — Device

## (undated) DEVICE — SUTURE PERMA-HAND SZ 2-0 L30IN NONABSORBABLE BLK L26MM SH K833H

## (undated) DEVICE — CLEAN UP KIT: Brand: MEDLINE INDUSTRIES, INC.

## (undated) DEVICE — DRAPE,U/SHT,SPLIT,FILM,60X84,STERILE: Brand: MEDLINE

## (undated) DEVICE — BANDAGE,GAUZE,BULKEE II,4.5"X4.1YD,STRL: Brand: MEDLINE

## (undated) DEVICE — SUTURE VICRYL + SZ 3-0 L27IN ABSRB VLT CT-2 1/2 CIR VCP332H

## (undated) DEVICE — SOLUTION IV 1000ML 0.9% SOD CHL

## (undated) DEVICE — DRAPE C ARM UNIV W41XL74IN CLR PLAS XR VELC CLSR POLY STRP

## (undated) DEVICE — SUTURE PERMAHAND SZ 0 L30IN NONABSORBABLE BLK SILK BRAID A306H

## (undated) DEVICE — SUTURE PERMAHAND SZ 0 L30IN NONABSORBABLE BLK L26MM SH 1/2 K834H

## (undated) DEVICE — SOLUTION IRRIG 1000ML 0.9% SOD CHL USP POUR PLAS BTL

## (undated) DEVICE — BLANKET WRM W29.9XL79.1IN UP BODY FORC AIR MISTRAL-AIR

## (undated) DEVICE — SUTURE VICRYL + SZ 3-0 L27IN ABSRB UD L26MM SH 1/2 CIR VCP416H

## (undated) DEVICE — TABLE COVER: Brand: CONVERTORS

## (undated) DEVICE — SPONGE LAP W18XL18IN WHT COT 4 PLY FLD STRUNG RADPQ DISP ST 2 PER PACK

## (undated) DEVICE — DRESSING,GAUZE,XEROFORM,CURAD,1"X8",ST: Brand: CURAD

## (undated) DEVICE — WRAP COMPR W4INXL5YD NONSTERILE TAN SELF ADH COBAN

## (undated) DEVICE — GLOVE SURG SZ 65 L12IN FNGR THK79MIL GRN LTX FREE

## (undated) DEVICE — TAPE ADH W1INXL10YD CLTH SILK H2O RESIST CURAD

## (undated) DEVICE — PAD,ABDOMINAL,8"X10",ST,LF: Brand: MEDLINE

## (undated) DEVICE — APPLICATOR MEDICATED 26 CC SOLUTION HI LT ORNG CHLORAPREP

## (undated) DEVICE — KIT OR TURNOVER

## (undated) DEVICE — SUTURE PERMA-HAND 2-0 L30IN NONABSORBABLE BLK MH L36MM 1/2 K843H

## (undated) DEVICE — BANDAGE COBAN 6 IN WND 6INX5YD FOAM

## (undated) DEVICE — MAYO STAND COVER: Brand: CONVERTORS

## (undated) DEVICE — BANDAGE COBAN 4 IN COMPR W4INXL5YD FOAM COHESIVE QUIK STK SELF ADH SFT

## (undated) DEVICE — SUTURE VICRYL + SZ 2-0 L18IN ABSRB CLR TIE POLYGLACTIN 910 VCP111G

## (undated) DEVICE — SOLUTION SCRB 4OZ 4% CHG CLN BASE FOR PT SKIN ANTISEPSIS

## (undated) DEVICE — BLADE SCALPEL NO 15 STERILE

## (undated) DEVICE — INTENDED FOR TISSUE SEPARATION, AND OTHER PROCEDURES THAT REQUIRE A SHARP SURGICAL BLADE TO PUNCTURE OR CUT.: Brand: BARD-PARKER SAFETY BLADES SIZE 10, STERILE

## (undated) DEVICE — GAUZE, BORDER, 3"X6", 1.5"X4"PAD, STERIL: Brand: MEDLINE INDUSTRIES, INC.

## (undated) DEVICE — Z DISCONTINUED USE 2220295 SUTURE VICRYL SZ 0 L18IN ABSRB UD L36MM CT-1 1/2 CIR J840D

## (undated) DEVICE — STOCKINETTE,IMPERVIOUS,12X48,STERILE: Brand: MEDLINE

## (undated) DEVICE — SUTURE VICRYL SZ 0 L27IN ABSRB UD L36MM CT-1 1/2 CIR J260H

## (undated) DEVICE — GLOVE SURG SZ 7 L11.33IN FNGR THK9.8MIL STRW LTX POLYMER

## (undated) DEVICE — SSC BONE WAX: Brand: SSC BONE WAX

## (undated) DEVICE — COVER LT HNDL FLX

## (undated) DEVICE — PREMIUM DRY TRAY LF: Brand: MEDLINE INDUSTRIES, INC.

## (undated) DEVICE — BANDAGE COMPR W4INXL5YD WHT BGE POLY COT M E WRP WV HK AND

## (undated) DEVICE — BANDAGE COMPR SGL LAYERED CLP CLSR ELAS 15FT LEN 6IN W

## (undated) DEVICE — GLOVE SURG SZ 7.5 L11.73IN FNGR THK9.8MIL STRW LTX POLYMER

## (undated) DEVICE — 3M™ STERI-DRAPE™ INCISE DRAPE 1050 (60CM X 45CM): Brand: STERI-DRAPE™

## (undated) DEVICE — STERILE HOOK LOCK LATEX FREE ELASTIC BANDAGE 4INX5YD: Brand: HOOK LOCK™

## (undated) DEVICE — BANDAGE,GAUZE,BULKEE LITE,3"X4.1YD,STRL: Brand: MEDLINE

## (undated) DEVICE — THREE-QUARTER SHEET: Brand: CONVERTORS

## (undated) DEVICE — NEPTUNE E-SEP SMOKE EVACUATION PENCIL, COATED, 70MM BLADE, PUSH BUTTON SWITCH: Brand: NEPTUNE E-SEP

## (undated) DEVICE — SUTURE PERMA-HAND SZ 2-0 L24IN NONABSORBABLE BLK W/O NDL SA75H

## (undated) DEVICE — SUTURE ABSORBABLE BRAIDED 2-0 CT-1 27 IN UD VICRYL J259H

## (undated) DEVICE — 3M™ IOBAN™ 2 ANTIMICROBIAL INCISE DRAPE 6651EZ: Brand: IOBAN™ 2

## (undated) DEVICE — SUTURE ETHILON SZ 3-0 L18IN NONABSORBABLE BLK L19MM PC-5 3/8 1893G

## (undated) DEVICE — 1LYRTR 16FR10ML100%SILI UMSNAP: Brand: MEDLINE INDUSTRIES, INC.

## (undated) DEVICE — PADDING CAST W4XL144IN WHT COT SYN RL NONADHESIVE SOF-ROL

## (undated) DEVICE — INTENDED FOR TISSUE SEPARATION, AND OTHER PROCEDURES THAT REQUIRE A SHARP SURGICAL BLADE TO PUNCTURE OR CUT.: Brand: BARD-PARKER SAFETY BLADES SIZE 15, STERILE

## (undated) DEVICE — GLOVE SURG SZ 65 THK91MIL LTX FREE SYN POLYISOPRENE

## (undated) DEVICE — TRAY PREP DRY W/ PREM GLV 2 APPL 6 SPNG 2 UNDPD 1 OVERWRAP

## (undated) DEVICE — SHEET,DRAPE,70X100,STERILE: Brand: MEDLINE

## (undated) DEVICE — 2108 SERIES SAGITTAL BLADE (24.8 X 1.24 X 80.1MM)

## (undated) DEVICE — BANDAGE,ELASTIC,ESMARK,STERILE,4"X9',LF: Brand: MEDLINE

## (undated) DEVICE — SUTURE PERMA HND SZ 0 L18IN NONABSORBABLE BLK L30MM PSL REV 580H

## (undated) DEVICE — SPLINT KNEE L24IN FOR 32IN THGH UNIV FOAM 3 PC DSGN TRIMMED

## (undated) DEVICE — BNDG,ELSTC,MATRIX,STRL,6"X5YD,LF,HOOK&LP: Brand: MEDLINE

## (undated) DEVICE — SUTURE PROL SZ 2-0 L30IN NONABSORBABLE BLU L26MM SH 1/2 CIR 8833H

## (undated) DEVICE — SUTURE VICRYL SZ 2-0 L27IN ABSRB UD L26MM SH 1/2 CIR J417H

## (undated) DEVICE — PACK SURG CUST BSHR TOT KNEE LF MMC

## (undated) DEVICE — ELECTRODE PT RET AD L9FT HI MOIST COND ADH HYDRGEL CORDED

## (undated) DEVICE — DRAPE,EXTREMITY,89X128,STERILE: Brand: MEDLINE

## (undated) DEVICE — DRESSING COMP W4XL4IN N ADH PD W2.5XL2.5IN GZ BORDERED ADH

## (undated) DEVICE — BLADE CLIPPER GEN PURP NS

## (undated) DEVICE — PADDING CAST W6XL144IN WHT COT SYN RL NONADHESIVE SOF-ROL

## (undated) DEVICE — SUTURE PERMA-HAND SZ 3-0 L24IN NONABSORBABLE BLK W/O NDL SA74H

## (undated) DEVICE — 3 ML SYRINGE WITH HYPODERMIC SAFETY NEEDLE: Brand: MAGELLAN